# Patient Record
Sex: FEMALE | Race: BLACK OR AFRICAN AMERICAN | NOT HISPANIC OR LATINO | Employment: FULL TIME | URBAN - METROPOLITAN AREA
[De-identification: names, ages, dates, MRNs, and addresses within clinical notes are randomized per-mention and may not be internally consistent; named-entity substitution may affect disease eponyms.]

---

## 2023-03-21 ENCOUNTER — TELEPHONE (OUTPATIENT)
Dept: PRIMARY CARE | Facility: CLINIC | Age: 68
End: 2023-03-21

## 2023-05-01 DIAGNOSIS — I10 BENIGN ESSENTIAL HYPERTENSION: Primary | ICD-10-CM

## 2023-05-01 RX ORDER — HYDROCHLOROTHIAZIDE 25 MG/1
25 TABLET ORAL DAILY
Qty: 90 TABLET | Refills: 0 | Status: SHIPPED | OUTPATIENT
Start: 2023-05-01 | End: 2023-08-20

## 2023-05-01 RX ORDER — HYDROCHLOROTHIAZIDE 25 MG/1
1 TABLET ORAL DAILY
COMMUNITY
Start: 2020-10-15 | End: 2023-05-01 | Stop reason: SDUPTHER

## 2023-05-19 DIAGNOSIS — E11.22 CKD STAGE 3 DUE TO TYPE 2 DIABETES MELLITUS (MULTI): Primary | ICD-10-CM

## 2023-05-19 DIAGNOSIS — N18.30 CKD STAGE 3 DUE TO TYPE 2 DIABETES MELLITUS (MULTI): Primary | ICD-10-CM

## 2023-05-19 RX ORDER — DULAGLUTIDE 0.75 MG/.5ML
0.75 INJECTION, SOLUTION SUBCUTANEOUS
COMMUNITY
Start: 2021-03-09 | End: 2023-05-19 | Stop reason: SDUPTHER

## 2023-05-19 RX ORDER — DULAGLUTIDE 0.75 MG/.5ML
0.75 INJECTION, SOLUTION SUBCUTANEOUS
Qty: 6 ML | Refills: 0 | Status: SHIPPED | OUTPATIENT
Start: 2023-05-19 | End: 2023-08-07 | Stop reason: DRUGHIGH

## 2023-06-06 DIAGNOSIS — R09.81 SINUS CONGESTION: Primary | ICD-10-CM

## 2023-06-06 RX ORDER — FLUTICASONE PROPIONATE 50 MCG
SPRAY, SUSPENSION (ML) NASAL
COMMUNITY
Start: 2021-01-27 | End: 2023-06-06 | Stop reason: SDUPTHER

## 2023-06-06 RX ORDER — FLUTICASONE PROPIONATE 50 MCG
2 SPRAY, SUSPENSION (ML) NASAL
Qty: 16 G | Refills: 1 | Status: SHIPPED | OUTPATIENT
Start: 2023-06-06 | End: 2023-08-20

## 2023-08-07 ENCOUNTER — OFFICE VISIT (OUTPATIENT)
Dept: PRIMARY CARE | Facility: CLINIC | Age: 68
End: 2023-08-07
Payer: MEDICARE

## 2023-08-07 VITALS
OXYGEN SATURATION: 99 % | HEIGHT: 66 IN | WEIGHT: 235 LBS | BODY MASS INDEX: 37.77 KG/M2 | HEART RATE: 81 BPM | SYSTOLIC BLOOD PRESSURE: 120 MMHG | DIASTOLIC BLOOD PRESSURE: 82 MMHG | TEMPERATURE: 97.5 F

## 2023-08-07 DIAGNOSIS — E03.9 HYPOTHYROIDISM, UNSPECIFIED TYPE: ICD-10-CM

## 2023-08-07 DIAGNOSIS — E11.22 CKD STAGE 3 DUE TO TYPE 2 DIABETES MELLITUS (MULTI): ICD-10-CM

## 2023-08-07 DIAGNOSIS — I10 BENIGN ESSENTIAL HYPERTENSION: Primary | ICD-10-CM

## 2023-08-07 DIAGNOSIS — N18.30 CKD STAGE 3 DUE TO TYPE 2 DIABETES MELLITUS (MULTI): ICD-10-CM

## 2023-08-07 DIAGNOSIS — M50.20 CERVICAL DISC HERNIATION: ICD-10-CM

## 2023-08-07 PROCEDURE — 3074F SYST BP LT 130 MM HG: CPT | Performed by: FAMILY MEDICINE

## 2023-08-07 PROCEDURE — 3079F DIAST BP 80-89 MM HG: CPT | Performed by: FAMILY MEDICINE

## 2023-08-07 PROCEDURE — 1160F RVW MEDS BY RX/DR IN RCRD: CPT | Performed by: FAMILY MEDICINE

## 2023-08-07 PROCEDURE — 3066F NEPHROPATHY DOC TX: CPT | Performed by: FAMILY MEDICINE

## 2023-08-07 PROCEDURE — 1159F MED LIST DOCD IN RCRD: CPT | Performed by: FAMILY MEDICINE

## 2023-08-07 PROCEDURE — 1125F AMNT PAIN NOTED PAIN PRSNT: CPT | Performed by: FAMILY MEDICINE

## 2023-08-07 PROCEDURE — 1036F TOBACCO NON-USER: CPT | Performed by: FAMILY MEDICINE

## 2023-08-07 PROCEDURE — 99214 OFFICE O/P EST MOD 30 MIN: CPT | Performed by: FAMILY MEDICINE

## 2023-08-07 RX ORDER — MECLIZINE HCL 12.5 MG 12.5 MG/1
TABLET ORAL
COMMUNITY
Start: 2019-01-10 | End: 2023-11-22 | Stop reason: DRUGHIGH

## 2023-08-07 RX ORDER — ATORVASTATIN CALCIUM 40 MG/1
TABLET, FILM COATED ORAL
COMMUNITY
Start: 2013-12-16 | End: 2024-02-28 | Stop reason: SDUPTHER

## 2023-08-07 RX ORDER — HYDROXYZINE HYDROCHLORIDE 10 MG/1
1 TABLET, FILM COATED ORAL 3 TIMES DAILY PRN
COMMUNITY
Start: 2020-11-11 | End: 2024-05-19 | Stop reason: ALTCHOICE

## 2023-08-07 RX ORDER — GABAPENTIN 100 MG/1
CAPSULE ORAL NIGHTLY
COMMUNITY
Start: 2021-05-05 | End: 2024-05-19 | Stop reason: WASHOUT

## 2023-08-07 RX ORDER — LEVOTHYROXINE SODIUM 125 UG/1
TABLET ORAL
COMMUNITY
Start: 2013-12-16 | End: 2023-09-05 | Stop reason: SDUPTHER

## 2023-08-07 RX ORDER — SITAGLIPTIN 100 MG/1
TABLET, FILM COATED ORAL
COMMUNITY
Start: 2018-04-23 | End: 2023-10-24 | Stop reason: SDUPTHER

## 2023-08-07 RX ORDER — DULAGLUTIDE 1.5 MG/.5ML
1.5 INJECTION, SOLUTION SUBCUTANEOUS
Qty: 2 ML | Refills: 3 | Status: SHIPPED | OUTPATIENT
Start: 2023-08-07 | End: 2023-08-19 | Stop reason: SDUPTHER

## 2023-08-07 RX ORDER — PHENOL/SODIUM PHENOLATE
20 AEROSOL, SPRAY (ML) MUCOUS MEMBRANE DAILY
COMMUNITY
Start: 2013-12-16

## 2023-08-07 RX ORDER — AMLODIPINE BESYLATE 5 MG/1
1 TABLET ORAL DAILY
COMMUNITY
Start: 2020-10-15 | End: 2023-12-22

## 2023-08-07 RX ORDER — ASPIRIN 81 MG/1
1 TABLET ORAL DAILY
COMMUNITY
Start: 2011-08-03

## 2023-08-07 RX ORDER — METFORMIN HYDROCHLORIDE 1000 MG/1
TABLET ORAL
COMMUNITY
Start: 2013-12-16 | End: 2023-09-05 | Stop reason: SINTOL

## 2023-08-07 ASSESSMENT — PATIENT HEALTH QUESTIONNAIRE - PHQ9
SUM OF ALL RESPONSES TO PHQ9 QUESTIONS 1 AND 2: 0
1. LITTLE INTEREST OR PLEASURE IN DOING THINGS: NOT AT ALL
2. FEELING DOWN, DEPRESSED OR HOPELESS: NOT AT ALL

## 2023-08-07 ASSESSMENT — PAIN SCALES - GENERAL: PAINLEVEL: 3

## 2023-08-07 NOTE — PATIENT INSTRUCTIONS
BP seems to be controlled.  Initially high, but came down, and did not  yet take medication today.  No change in dose.    Diabetes--not tolerating metformin, so stopped 5 days ago.   Increased trulicity to 1.5 weekly.  Treatment goals:  HgbA1C less than 7.0  /80 on consistent basis, with no higher than 140/85  LDL cholesterol less than 100, and HDL cholesterol above 40.  Yearly retinal exam  Check feet periodically for sores or decreased sensation  Exercise at least 150 minutes weekly.  Work toward a goal BMI of less than 25.    Check fasting blood work when able, and will recheck A1C again in 3 months, after increase in trulicity dosing, to see if additional changes need to be made.  Fast for 12 hours prior to having blood drawn.  You should drink plenty of water, and can have black tea or black coffee, if you'd like.    For neck pain, concussion, continue with specialists

## 2023-08-07 NOTE — PROGRESS NOTES
"Subjective   Patient ID: Earline Villagran is a 67 y.o. female who presents for Medication Question and Earache.  Feb had concussion, hit in back of head with basketball.  No LOC.  Was off work until April.  Seeing Dr. Sims from , and a [facility] doctor.  MRI cervical spine showed herniated discs.    Metformin is burning stomach.  Also causes diarrhea.  Stopped.  Not checking glucose much.  148 a couple days ago, probably fasting.    Left ear is bothering  her.  Hurt at first, now feels blocked.  About 5 days ago.    Has seen ophtho.    Earache         Review of Systems   HENT:  Positive for ear pain.        Objective   /82   Pulse 81   Temp 36.4 °C (97.5 °F) (Oral)   Ht 1.676 m (5' 6\")   Wt 107 kg (235 lb)   SpO2 99%   BMI 37.93 kg/m²     Physical Exam  Vitals reviewed.   Constitutional:       Appearance: Normal appearance.   HENT:      Right Ear: Ear canal normal.      Left Ear: Ear canal normal.      Ears:      Comments: TM's dull, no erythema.  Cardiovascular:      Rate and Rhythm: Normal rate and regular rhythm.      Heart sounds: No murmur heard.  Pulmonary:      Effort: Pulmonary effort is normal.      Breath sounds: Normal breath sounds.   Musculoskeletal:      Right lower leg: No edema.      Left lower leg: No edema.   Neurological:      Mental Status: She is alert.           Assessment/Plan   Problem List Items Addressed This Visit       Benign essential hypertension - Primary    Relevant Orders    Basic Metabolic Panel    Lipid Panel    CKD stage 3 due to type 2 diabetes mellitus (CMS/[facility])    Relevant Medications    dulaglutide (Trulicity) 1.5 mg/0.5 mL pen injector injection    Other Relevant Orders    Hemoglobin A1C     Other Visit Diagnoses       Cervical disc herniation        Hypothyroidism, unspecified type        Relevant Orders    TSH with reflex to Free T4 if abnormal               "

## 2023-08-09 ENCOUNTER — LAB (OUTPATIENT)
Dept: LAB | Facility: LAB | Age: 68
End: 2023-08-09
Payer: MEDICARE

## 2023-08-09 DIAGNOSIS — E03.9 HYPOTHYROIDISM, UNSPECIFIED TYPE: ICD-10-CM

## 2023-08-09 DIAGNOSIS — I10 BENIGN ESSENTIAL HYPERTENSION: ICD-10-CM

## 2023-08-09 DIAGNOSIS — N18.30 CKD STAGE 3 DUE TO TYPE 2 DIABETES MELLITUS (MULTI): ICD-10-CM

## 2023-08-09 DIAGNOSIS — E11.22 CKD STAGE 3 DUE TO TYPE 2 DIABETES MELLITUS (MULTI): ICD-10-CM

## 2023-08-09 LAB
ANION GAP IN SER/PLAS: 13 MMOL/L (ref 10–20)
CALCIUM (MG/DL) IN SER/PLAS: 9.4 MG/DL (ref 8.6–10.6)
CARBON DIOXIDE, TOTAL (MMOL/L) IN SER/PLAS: 29 MMOL/L (ref 21–32)
CHLORIDE (MMOL/L) IN SER/PLAS: 104 MMOL/L (ref 98–107)
CHOLESTEROL (MG/DL) IN SER/PLAS: 276 MG/DL (ref 0–199)
CHOLESTEROL IN HDL (MG/DL) IN SER/PLAS: 48.5 MG/DL
CHOLESTEROL/HDL RATIO: 5.7
CREATININE (MG/DL) IN SER/PLAS: 1.32 MG/DL (ref 0.5–1.05)
ESTIMATED AVERAGE GLUCOSE FOR HBA1C: 186 MG/DL
GFR FEMALE: 44 ML/MIN/1.73M2
GLUCOSE (MG/DL) IN SER/PLAS: 148 MG/DL (ref 74–99)
HEMOGLOBIN A1C/HEMOGLOBIN TOTAL IN BLOOD: 8.1 %
LDL: 198 MG/DL (ref 0–99)
POTASSIUM (MMOL/L) IN SER/PLAS: 4.7 MMOL/L (ref 3.5–5.3)
SODIUM (MMOL/L) IN SER/PLAS: 141 MMOL/L (ref 136–145)
THYROTROPIN (MIU/L) IN SER/PLAS BY DETECTION LIMIT <= 0.05 MIU/L: 35.33 MIU/L (ref 0.44–3.98)
THYROXINE (T4) FREE (NG/DL) IN SER/PLAS: 0.8 NG/DL (ref 0.78–1.48)
TRIGLYCERIDE (MG/DL) IN SER/PLAS: 148 MG/DL (ref 0–149)
UREA NITROGEN (MG/DL) IN SER/PLAS: 15 MG/DL (ref 6–23)
VLDL: 30 MG/DL (ref 0–40)

## 2023-08-09 PROCEDURE — 84439 ASSAY OF FREE THYROXINE: CPT

## 2023-08-09 PROCEDURE — 84443 ASSAY THYROID STIM HORMONE: CPT

## 2023-08-09 PROCEDURE — 83036 HEMOGLOBIN GLYCOSYLATED A1C: CPT

## 2023-08-09 PROCEDURE — 80061 LIPID PANEL: CPT

## 2023-08-09 PROCEDURE — 80048 BASIC METABOLIC PNL TOTAL CA: CPT

## 2023-08-09 PROCEDURE — 36415 COLL VENOUS BLD VENIPUNCTURE: CPT

## 2023-08-18 ENCOUNTER — TELEPHONE (OUTPATIENT)
Dept: PRIMARY CARE | Facility: CLINIC | Age: 68
End: 2023-08-18
Payer: MEDICARE

## 2023-08-18 DIAGNOSIS — E11.22 CKD STAGE 3 DUE TO TYPE 2 DIABETES MELLITUS (MULTI): ICD-10-CM

## 2023-08-18 DIAGNOSIS — N18.30 CKD STAGE 3 DUE TO TYPE 2 DIABETES MELLITUS (MULTI): ICD-10-CM

## 2023-08-19 DIAGNOSIS — I10 BENIGN ESSENTIAL HYPERTENSION: ICD-10-CM

## 2023-08-19 DIAGNOSIS — R09.81 SINUS CONGESTION: ICD-10-CM

## 2023-08-19 RX ORDER — DULAGLUTIDE 1.5 MG/.5ML
1.5 INJECTION, SOLUTION SUBCUTANEOUS
Qty: 12 EACH | Refills: 1 | Status: SHIPPED | OUTPATIENT
Start: 2023-08-19 | End: 2024-01-07 | Stop reason: DRUGHIGH

## 2023-08-20 RX ORDER — FLUTICASONE PROPIONATE 50 MCG
2 SPRAY, SUSPENSION (ML) NASAL DAILY
Qty: 32 G | Refills: 1 | Status: SHIPPED | OUTPATIENT
Start: 2023-08-20 | End: 2024-01-28

## 2023-08-20 RX ORDER — HYDROCHLOROTHIAZIDE 25 MG/1
25 TABLET ORAL DAILY
Qty: 90 TABLET | Refills: 1 | Status: SHIPPED | OUTPATIENT
Start: 2023-08-20 | End: 2023-12-29 | Stop reason: SDUPTHER

## 2023-09-05 ENCOUNTER — OFFICE VISIT (OUTPATIENT)
Dept: PRIMARY CARE | Facility: CLINIC | Age: 68
End: 2023-09-05
Payer: MEDICARE

## 2023-09-05 VITALS
SYSTOLIC BLOOD PRESSURE: 122 MMHG | WEIGHT: 236 LBS | HEART RATE: 106 BPM | DIASTOLIC BLOOD PRESSURE: 82 MMHG | OXYGEN SATURATION: 96 % | HEIGHT: 66 IN | TEMPERATURE: 97.3 F | BODY MASS INDEX: 37.93 KG/M2

## 2023-09-05 DIAGNOSIS — E03.9 HYPOTHYROIDISM, UNSPECIFIED TYPE: ICD-10-CM

## 2023-09-05 DIAGNOSIS — N18.30 CKD STAGE 3 DUE TO TYPE 2 DIABETES MELLITUS (MULTI): Primary | ICD-10-CM

## 2023-09-05 DIAGNOSIS — E11.22 CKD STAGE 3 DUE TO TYPE 2 DIABETES MELLITUS (MULTI): Primary | ICD-10-CM

## 2023-09-05 PROCEDURE — 3079F DIAST BP 80-89 MM HG: CPT | Performed by: FAMILY MEDICINE

## 2023-09-05 PROCEDURE — 3066F NEPHROPATHY DOC TX: CPT | Performed by: FAMILY MEDICINE

## 2023-09-05 PROCEDURE — 99213 OFFICE O/P EST LOW 20 MIN: CPT | Performed by: FAMILY MEDICINE

## 2023-09-05 PROCEDURE — 1126F AMNT PAIN NOTED NONE PRSNT: CPT | Performed by: FAMILY MEDICINE

## 2023-09-05 PROCEDURE — 1160F RVW MEDS BY RX/DR IN RCRD: CPT | Performed by: FAMILY MEDICINE

## 2023-09-05 PROCEDURE — 3074F SYST BP LT 130 MM HG: CPT | Performed by: FAMILY MEDICINE

## 2023-09-05 PROCEDURE — 3052F HG A1C>EQUAL 8.0%<EQUAL 9.0%: CPT | Performed by: FAMILY MEDICINE

## 2023-09-05 PROCEDURE — 1036F TOBACCO NON-USER: CPT | Performed by: FAMILY MEDICINE

## 2023-09-05 PROCEDURE — 1159F MED LIST DOCD IN RCRD: CPT | Performed by: FAMILY MEDICINE

## 2023-09-05 RX ORDER — GLIPIZIDE 10 MG/1
10 TABLET ORAL
Qty: 180 TABLET | Refills: 1 | Status: SHIPPED | OUTPATIENT
Start: 2023-09-05 | End: 2023-09-05 | Stop reason: SDUPTHER

## 2023-09-05 RX ORDER — LEVOTHYROXINE SODIUM 137 UG/1
137 TABLET ORAL
Qty: 90 TABLET | Refills: 0 | Status: SHIPPED | OUTPATIENT
Start: 2023-09-05 | End: 2023-11-12

## 2023-09-05 RX ORDER — GLIPIZIDE 10 MG/1
10 TABLET ORAL
Qty: 180 TABLET | Refills: 1 | Status: SHIPPED | OUTPATIENT
Start: 2023-09-05 | End: 2023-11-18 | Stop reason: WASHOUT

## 2023-09-05 ASSESSMENT — PAIN SCALES - GENERAL: PAINLEVEL: 0-NO PAIN

## 2023-09-05 NOTE — PROGRESS NOTES
"Subjective   Patient ID: Earline Villagran is a 67 y.o. female who presents for Leg Cramps (\"Charley Horses \" both legs , muscles jumping ) and Rash (Skin changing arms ).  Patient had episode of muscle spasms right hand and both legs.  Was pretty intense.  Happened 3 days ago, thinks it was related to eating watermelon (although has eaten it plenty of times without a problem)  Took 4 gabapentin, and eventually subsided. Hasn't come back.  Glucose running over 240.  Trulicity higher dose now, but not taking metformin, since caused stomach burning.  Also off of glipizide, since thought that was what had been causing the abdominal pain.    Needs refill of levothyroxine.  Currently on 125 mcg, but needed to be increased due to High TSH.    Rash        Review of Systems   Skin:  Positive for rash.       Objective   /82 (BP Location: Right arm, Patient Position: Sitting, BP Cuff Size: Large adult)   Pulse 106   Temp 36.3 °C (97.3 °F) (Temporal)   Ht 1.676 m (5' 6\")   Wt 107 kg (236 lb)   SpO2 96%   BMI 38.09 kg/m²     Physical Exam  Vitals reviewed.   Constitutional:       Appearance: Normal appearance.   Cardiovascular:      Rate and Rhythm: Normal rate and regular rhythm.   Pulmonary:      Effort: Pulmonary effort is normal.      Breath sounds: Normal breath sounds.   Neurological:      Mental Status: She is alert.           Assessment/Plan   Problem List Items Addressed This Visit       CKD stage 3 due to type 2 diabetes mellitus (CMS/HCC) - Primary    Relevant Medications    glipiZIDE (Glucotrol) 10 mg tablet     Other Visit Diagnoses       Hypothyroidism, unspecified type        Relevant Medications    levothyroxine (Synthroid, Levoxyl) 137 mcg tablet               "

## 2023-09-06 NOTE — PATIENT INSTRUCTIONS
Diabetes with A1C 8.1, and glucose running high at home.  Continue the increased dose of Trulicity of 1.5 weekly.  Remain off of metformin.  Restart glipizide 10 mg once a day, but increase to twice a day, if glucose is still high.  Follow up 3 months.                                       Treatment goals include:  HgbA1C less than 7.0  /80 on consistent basis, with no higher than 140/85  LDL cholesterol less than 100, and HDL cholesterol above 40.  Yearly retinal exam  Check feet periodically for sores or decreased sensation  Exercise at least 150 minutes weekly.  Work toward a goal BMI of less than 25.    For thyroid, increase the levothyroxine to 137 mcg daily.  Recheck level  2-3 months.

## 2023-10-24 DIAGNOSIS — N18.30 CKD STAGE 3 DUE TO TYPE 2 DIABETES MELLITUS (MULTI): Primary | ICD-10-CM

## 2023-10-24 DIAGNOSIS — E11.22 CKD STAGE 3 DUE TO TYPE 2 DIABETES MELLITUS (MULTI): Primary | ICD-10-CM

## 2023-10-26 ENCOUNTER — OFFICE VISIT (OUTPATIENT)
Dept: PRIMARY CARE | Facility: CLINIC | Age: 68
End: 2023-10-26
Payer: MEDICARE

## 2023-10-26 ENCOUNTER — ANCILLARY PROCEDURE (OUTPATIENT)
Dept: RADIOLOGY | Facility: CLINIC | Age: 68
End: 2023-10-26
Payer: MEDICARE

## 2023-10-26 VITALS
WEIGHT: 229 LBS | BODY MASS INDEX: 36.8 KG/M2 | SYSTOLIC BLOOD PRESSURE: 112 MMHG | TEMPERATURE: 97.5 F | HEIGHT: 66 IN | OXYGEN SATURATION: 97 % | DIASTOLIC BLOOD PRESSURE: 74 MMHG | HEART RATE: 104 BPM

## 2023-10-26 DIAGNOSIS — K59.00 CONSTIPATION, UNSPECIFIED CONSTIPATION TYPE: ICD-10-CM

## 2023-10-26 DIAGNOSIS — K59.00 CONSTIPATION, UNSPECIFIED CONSTIPATION TYPE: Primary | ICD-10-CM

## 2023-10-26 PROCEDURE — 1160F RVW MEDS BY RX/DR IN RCRD: CPT | Performed by: FAMILY MEDICINE

## 2023-10-26 PROCEDURE — 74018 RADEX ABDOMEN 1 VIEW: CPT | Performed by: RADIOLOGY

## 2023-10-26 PROCEDURE — 3074F SYST BP LT 130 MM HG: CPT | Performed by: FAMILY MEDICINE

## 2023-10-26 PROCEDURE — 3078F DIAST BP <80 MM HG: CPT | Performed by: FAMILY MEDICINE

## 2023-10-26 PROCEDURE — 1126F AMNT PAIN NOTED NONE PRSNT: CPT | Performed by: FAMILY MEDICINE

## 2023-10-26 PROCEDURE — 1036F TOBACCO NON-USER: CPT | Performed by: FAMILY MEDICINE

## 2023-10-26 PROCEDURE — 74018 RADEX ABDOMEN 1 VIEW: CPT | Mod: FY

## 2023-10-26 PROCEDURE — 1159F MED LIST DOCD IN RCRD: CPT | Performed by: FAMILY MEDICINE

## 2023-10-26 PROCEDURE — 3052F HG A1C>EQUAL 8.0%<EQUAL 9.0%: CPT | Performed by: FAMILY MEDICINE

## 2023-10-26 PROCEDURE — 3066F NEPHROPATHY DOC TX: CPT | Performed by: FAMILY MEDICINE

## 2023-10-26 PROCEDURE — 99214 OFFICE O/P EST MOD 30 MIN: CPT | Performed by: FAMILY MEDICINE

## 2023-10-26 ASSESSMENT — PAIN SCALES - GENERAL: PAINLEVEL: 0-NO PAIN

## 2023-10-26 ASSESSMENT — PATIENT HEALTH QUESTIONNAIRE - PHQ9
2. FEELING DOWN, DEPRESSED OR HOPELESS: NOT AT ALL
1. LITTLE INTEREST OR PLEASURE IN DOING THINGS: NOT AT ALL
SUM OF ALL RESPONSES TO PHQ9 QUESTIONS 1 AND 2: 0

## 2023-10-26 NOTE — ASSESSMENT & PLAN NOTE
Review of history reveals long standing issue  Will obtain abdomen KUB to rule out any blockage or immediate changes  Recommend increasing fiber, take Miralax daily with increased fluids and increased fiber in diet, can titrate down if bowels return to regular  If persistent symptoms, suspect this may be related to delayed gastric emptying, changes in motility related to GLP-1, may need to reconsider diabetic regimen

## 2023-10-26 NOTE — PROGRESS NOTES
"Subjective   Patient ID: Earline Villagran is a 68 y.o. female who presents for Abdominal Pain and Constipation.    HPI   Having a hard time having a bowel movement.  Thought it was medication related therefore has stopped taking Metformin or Glipizide but has not had much improvement.  Continues to have intermittent episodes of burning sensation in the abdomen.    Started about 6 months ago and has been going on and off since that time.     Has been on Trulicity for 2 years, has been increasing the dose with that medication.     Has a hard time going and when she goes it is pebble like symptoms.  Longest without BM has been 2 days but generally has BM daily.    No blood in the stool, no mucus    Review of Systems  Negative unless noted in HPI    Objective   /74 (BP Location: Left arm, Patient Position: Sitting, BP Cuff Size: Large adult)   Pulse 104   Temp 36.4 °C (97.5 °F) (Oral)   Ht 1.676 m (5' 6\")   Wt 104 kg (229 lb)   SpO2 97%   BMI 36.96 kg/m²     Physical Exam  Constitutional:       Appearance: Normal appearance.   Cardiovascular:      Rate and Rhythm: Normal rate and regular rhythm.   Pulmonary:      Effort: Pulmonary effort is normal.   Abdominal:      General: Bowel sounds are normal. There is no distension.      Palpations: Abdomen is soft.      Tenderness: There is no abdominal tenderness. There is no guarding or rebound.   Neurological:      Mental Status: She is alert.         Assessment/Plan   Problem List Items Addressed This Visit             ICD-10-CM    Constipation - Primary K59.00     Review of history reveals long standing issue  Will obtain abdomen KUB to rule out any blockage or immediate changes  Recommend increasing fiber, take Miralax daily with increased fluids and increased fiber in diet, can titrate down if bowels return to regular  If persistent symptoms, suspect this may be related to delayed gastric emptying, changes in motility related to GLP-1, may need to reconsider " diabetic regimen         Relevant Orders    XR abdomen 1 view

## 2023-10-26 NOTE — LETTER
October 26, 2023     Patient: Earline Villagran   YOB: 1955   Date of Visit: 10/26/2023       To Whom It May Concern:    Earline Villagran was seen in my clinic on 10/26/2023 at 1:30 pm. Please excuse Earline for her absence from work on this day to make the appointment.    If you have any questions or concerns, please don't hesitate to call.         Sincerely,         Jyoti Osorio MD        CC: No Recipients

## 2023-11-07 ENCOUNTER — TELEPHONE (OUTPATIENT)
Dept: PRIMARY CARE | Facility: CLINIC | Age: 68
End: 2023-11-07
Payer: MEDICARE

## 2023-11-07 DIAGNOSIS — J32.9 SINUSITIS, UNSPECIFIED CHRONICITY, UNSPECIFIED LOCATION: Primary | ICD-10-CM

## 2023-11-07 RX ORDER — DOXYCYCLINE 100 MG/1
100 CAPSULE ORAL 2 TIMES DAILY
Qty: 20 CAPSULE | Refills: 0 | Status: SHIPPED | OUTPATIENT
Start: 2023-11-07 | End: 2023-11-08 | Stop reason: SDUPTHER

## 2023-11-08 DIAGNOSIS — J32.9 SINUSITIS, UNSPECIFIED CHRONICITY, UNSPECIFIED LOCATION: ICD-10-CM

## 2023-11-08 RX ORDER — DOXYCYCLINE 100 MG/1
100 CAPSULE ORAL 2 TIMES DAILY
Qty: 20 CAPSULE | Refills: 0 | Status: SHIPPED | OUTPATIENT
Start: 2023-11-08 | End: 2023-11-18

## 2023-11-11 DIAGNOSIS — E03.9 HYPOTHYROIDISM, UNSPECIFIED TYPE: ICD-10-CM

## 2023-11-12 RX ORDER — LEVOTHYROXINE SODIUM 137 UG/1
137 TABLET ORAL
Qty: 90 TABLET | Refills: 0 | Status: SHIPPED | OUTPATIENT
Start: 2023-11-12 | End: 2023-12-29 | Stop reason: SDUPTHER

## 2023-11-18 DIAGNOSIS — E11.22 CKD STAGE 3 DUE TO TYPE 2 DIABETES MELLITUS (MULTI): ICD-10-CM

## 2023-11-18 DIAGNOSIS — N18.30 CKD STAGE 3 DUE TO TYPE 2 DIABETES MELLITUS (MULTI): ICD-10-CM

## 2023-11-22 ENCOUNTER — OFFICE VISIT (OUTPATIENT)
Dept: PRIMARY CARE | Facility: CLINIC | Age: 68
End: 2023-11-22
Payer: MEDICARE

## 2023-11-22 VITALS
BODY MASS INDEX: 36.8 KG/M2 | TEMPERATURE: 97.5 F | HEART RATE: 98 BPM | DIASTOLIC BLOOD PRESSURE: 88 MMHG | SYSTOLIC BLOOD PRESSURE: 128 MMHG | OXYGEN SATURATION: 98 % | WEIGHT: 229 LBS | HEIGHT: 66 IN

## 2023-11-22 DIAGNOSIS — R42 VERTIGO: Primary | ICD-10-CM

## 2023-11-22 PROCEDURE — 1125F AMNT PAIN NOTED PAIN PRSNT: CPT | Performed by: FAMILY MEDICINE

## 2023-11-22 PROCEDURE — 1160F RVW MEDS BY RX/DR IN RCRD: CPT | Performed by: FAMILY MEDICINE

## 2023-11-22 PROCEDURE — 1159F MED LIST DOCD IN RCRD: CPT | Performed by: FAMILY MEDICINE

## 2023-11-22 PROCEDURE — 1036F TOBACCO NON-USER: CPT | Performed by: FAMILY MEDICINE

## 2023-11-22 PROCEDURE — 3074F SYST BP LT 130 MM HG: CPT | Performed by: FAMILY MEDICINE

## 2023-11-22 PROCEDURE — 99213 OFFICE O/P EST LOW 20 MIN: CPT | Performed by: FAMILY MEDICINE

## 2023-11-22 PROCEDURE — 3066F NEPHROPATHY DOC TX: CPT | Performed by: FAMILY MEDICINE

## 2023-11-22 PROCEDURE — 3079F DIAST BP 80-89 MM HG: CPT | Performed by: FAMILY MEDICINE

## 2023-11-22 PROCEDURE — 3052F HG A1C>EQUAL 8.0%<EQUAL 9.0%: CPT | Performed by: FAMILY MEDICINE

## 2023-11-22 RX ORDER — MECLIZINE HYDROCHLORIDE 25 MG/1
25 TABLET ORAL 3 TIMES DAILY PRN
Qty: 30 TABLET | Refills: 1 | OUTPATIENT
Start: 2023-11-22 | End: 2024-05-20

## 2023-11-22 ASSESSMENT — ENCOUNTER SYMPTOMS
DEPRESSION: 0
LOSS OF SENSATION IN FEET: 0
OCCASIONAL FEELINGS OF UNSTEADINESS: 0

## 2023-11-22 ASSESSMENT — PATIENT HEALTH QUESTIONNAIRE - PHQ9
1. LITTLE INTEREST OR PLEASURE IN DOING THINGS: NOT AT ALL
SUM OF ALL RESPONSES TO PHQ9 QUESTIONS 1 AND 2: 0
2. FEELING DOWN, DEPRESSED OR HOPELESS: NOT AT ALL

## 2023-11-22 ASSESSMENT — PAIN SCALES - GENERAL: PAINLEVEL: 4

## 2023-11-22 NOTE — PROGRESS NOTES
"Subjective   Patient ID: Earline Villagran is a 68 y.o. female who presents for Dizziness.  Dizziness started about 2 weeks ago.  Felt like had sinus infection, which feels  better, except  for the dizziness.  Just finishing doxycycline.  Tends to get an episode  like  this every fall, but usually resolves quicker.  Dizziness  worse with moving head, bending over.  Still  PND.  Voice is raspy.  No pain in face.  Still having frontal headache.  Ears  feel like may have water in them.  Can't  sleep on right side since it makes worse.  No vision change.  No slurry speech, or other neuro symptoms.  Doing flonase NS  Hasn't seen drainage color now.  Was yellow initially.    Right arm hurts, worse with lifting, for couple weeks.  Started after had 2 shots in that arm.  No injury    Sugars running high.        Review of Systems    Objective   /88 (BP Location: Right arm, Patient Position: Sitting, BP Cuff Size: Large adult)   Pulse 98   Temp 36.4 °C (97.5 °F) (Oral)   Ht 1.676 m (5' 6\")   Wt 104 kg (229 lb)   SpO2 98%   BMI 36.96 kg/m²     Physical Exam  Vitals reviewed.   Constitutional:       Appearance: Normal appearance.   HENT:      Head: Normocephalic and atraumatic.      Right Ear: Tympanic membrane and ear canal normal. Tympanic membrane is not injected.      Left Ear: Tympanic membrane and ear canal normal. Tympanic membrane is not injected.      Nose: Nose normal.      Mouth/Throat:      Mouth: Mucous membranes are moist.      Pharynx: Oropharynx is clear.   Eyes:      Extraocular Movements: Extraocular movements intact.      Conjunctiva/sclera: Conjunctivae normal.      Pupils: Pupils are equal, round, and reactive to light.      Comments: No nystagmus   Neck:      Vascular: No carotid bruit.   Cardiovascular:      Rate and Rhythm: Normal rate and regular rhythm.   Pulmonary:      Effort: Pulmonary effort is normal.      Breath sounds: Normal breath sounds.   Musculoskeletal:      Cervical back: No " rigidity.      Right lower leg: No edema.      Left lower leg: No edema.   Neurological:      General: No focal deficit present.      Mental Status: She is alert and oriented to person, place, and time. Mental status is at baseline.   Psychiatric:         Mood and Affect: Mood normal.         Behavior: Behavior normal.           Assessment/Plan   Problem List Items Addressed This Visit    None  Visit Diagnoses       Vertigo    -  Primary    Relevant Medications    meclizine (Antivert) 25 mg tablet

## 2023-11-24 NOTE — PATIENT INSTRUCTIONS
Vertigo without worrisome signs, likely inner ear.  Continue Flonase nasal spray to help with congestion.  Meclizine sent to pharmacy to help with vertigo, as needed.  If not improving, or if additional symptoms, call or email.  If acute worsening, slurred speech, weakness, go to ED.    Right arm pain may be from vaccines done recently.  For now will monitor.  If ongoing problems will evaluate further.

## 2023-12-11 ENCOUNTER — OFFICE VISIT (OUTPATIENT)
Dept: PRIMARY CARE | Facility: CLINIC | Age: 68
End: 2023-12-11
Payer: MEDICARE

## 2023-12-11 VITALS
OXYGEN SATURATION: 96 % | SYSTOLIC BLOOD PRESSURE: 126 MMHG | TEMPERATURE: 98.2 F | HEIGHT: 66 IN | BODY MASS INDEX: 36.8 KG/M2 | DIASTOLIC BLOOD PRESSURE: 76 MMHG | WEIGHT: 229 LBS | HEART RATE: 106 BPM

## 2023-12-11 DIAGNOSIS — N18.30 CKD STAGE 3 DUE TO TYPE 2 DIABETES MELLITUS (MULTI): ICD-10-CM

## 2023-12-11 DIAGNOSIS — E03.9 HYPOTHYROIDISM, UNSPECIFIED TYPE: ICD-10-CM

## 2023-12-11 DIAGNOSIS — I70.203 ATHEROSCLEROSIS OF NATIVE ARTERY OF BOTH LOWER EXTREMITIES, WITH UNSPECIFIED PRESENCE OF CLINICAL MANIFESTATION (CMS-HCC): ICD-10-CM

## 2023-12-11 DIAGNOSIS — E66.01 MORBID OBESITY (MULTI): ICD-10-CM

## 2023-12-11 DIAGNOSIS — I10 BENIGN ESSENTIAL HYPERTENSION: Primary | ICD-10-CM

## 2023-12-11 DIAGNOSIS — E11.22 CKD STAGE 3 DUE TO TYPE 2 DIABETES MELLITUS (MULTI): ICD-10-CM

## 2023-12-11 PROCEDURE — 1125F AMNT PAIN NOTED PAIN PRSNT: CPT | Performed by: FAMILY MEDICINE

## 2023-12-11 PROCEDURE — 99213 OFFICE O/P EST LOW 20 MIN: CPT | Performed by: FAMILY MEDICINE

## 2023-12-11 PROCEDURE — 1036F TOBACCO NON-USER: CPT | Performed by: FAMILY MEDICINE

## 2023-12-11 PROCEDURE — 3052F HG A1C>EQUAL 8.0%<EQUAL 9.0%: CPT | Performed by: FAMILY MEDICINE

## 2023-12-11 PROCEDURE — 1160F RVW MEDS BY RX/DR IN RCRD: CPT | Performed by: FAMILY MEDICINE

## 2023-12-11 PROCEDURE — 1159F MED LIST DOCD IN RCRD: CPT | Performed by: FAMILY MEDICINE

## 2023-12-11 PROCEDURE — 3078F DIAST BP <80 MM HG: CPT | Performed by: FAMILY MEDICINE

## 2023-12-11 PROCEDURE — 3066F NEPHROPATHY DOC TX: CPT | Performed by: FAMILY MEDICINE

## 2023-12-11 PROCEDURE — 3074F SYST BP LT 130 MM HG: CPT | Performed by: FAMILY MEDICINE

## 2023-12-11 ASSESSMENT — PAIN SCALES - GENERAL: PAINLEVEL: 5

## 2023-12-11 ASSESSMENT — ENCOUNTER SYMPTOMS
LOSS OF SENSATION IN FEET: 0
DEPRESSION: 0
OCCASIONAL FEELINGS OF UNSTEADINESS: 0

## 2023-12-11 ASSESSMENT — PATIENT HEALTH QUESTIONNAIRE - PHQ9
2. FEELING DOWN, DEPRESSED OR HOPELESS: NOT AT ALL
SUM OF ALL RESPONSES TO PHQ9 QUESTIONS 1 AND 2: 0
1. LITTLE INTEREST OR PLEASURE IN DOING THINGS: NOT AT ALL

## 2023-12-11 NOTE — PROGRESS NOTES
"Subjective   Patient ID: Earline Villagran is a 68 y.o. female who presents for Hypertension, Diabetes Mellitus, and Arm Pain.  Sinus getting  better since on doxycycline.  Still gets vertigo if lays on right side.  Otherwise doing better with bending over  and putting head back.  Previously wasn't  able to do that.    Hasn't checked glucose for about a week.  Was doing better when did check.    Right shoulder pain since had 2 injections in it   Peppermint oil rub is helping.    Energy is OK.  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        Review of Systems    Objective   /76 (BP Location: Right arm, Patient Position: Sitting, BP Cuff Size: Large adult)   Pulse 106   Temp 36.8 °C (98.2 °F) (Oral)   Ht 1.676 m (5' 6\")   Wt 104 kg (229 lb)   SpO2 96%   BMI 36.96 kg/m²     Physical Exam  Vitals reviewed.   Constitutional:       Appearance: Normal appearance.   Cardiovascular:      Rate and Rhythm: Normal rate and regular rhythm.      Heart sounds: No murmur heard.  Pulmonary:      Effort: Pulmonary effort is normal.      Breath sounds: Normal breath sounds.   Musculoskeletal:      Right lower leg: No edema.      Left lower leg: No edema.   Neurological:      Mental Status: She is alert.           Assessment/Plan   Problem List Items Addressed This Visit       Benign essential hypertension - Primary    CKD stage 3 due to type 2 diabetes mellitus (CMS/HCC)    Relevant Orders    Lipid Panel    Hemoglobin A1C    Basic Metabolic Panel    Atherosclerosis of native artery of both lower extremities, with unspecified presence of clinical manifestation (CMS/HCC)    Morbid obesity (CMS/HCC)     Other Visit Diagnoses       Hypothyroidism, unspecified type        Relevant Orders    TSH with reflex to Free T4 if abnormal               "

## 2023-12-11 NOTE — LETTER
December 11, 2023     Patient: Earline Villagran   YOB: 1955   Date of Visit: 12/11/2023       To Whom It May Concern:    Earline Villagran was seen in my clinic on 12/11/2023 at 5:00 pm. Please excuse Earline for her absence from work on this day to make the appointment.    If you have any questions or concerns, please don't hesitate to call.         Sincerely,         Patricia Calderon MD

## 2023-12-12 PROBLEM — E66.01 MORBID OBESITY (MULTI): Status: ACTIVE | Noted: 2023-12-12

## 2023-12-12 PROBLEM — I70.203 ATHEROSCLEROSIS OF NATIVE ARTERY OF BOTH LOWER EXTREMITIES, WITH UNSPECIFIED PRESENCE OF CLINICAL MANIFESTATION (CMS-HCC): Status: ACTIVE | Noted: 2023-12-12

## 2023-12-12 PROBLEM — C73 CANCER OF THYROID (MULTI): Status: RESOLVED | Noted: 2023-12-12 | Resolved: 2023-12-12

## 2023-12-12 PROBLEM — C73 CANCER OF THYROID (MULTI): Status: ACTIVE | Noted: 2023-12-12

## 2023-12-12 NOTE — PATIENT INSTRUCTIONS
Diabetes, currently taking  Trulicity of 1.5 weekly and glipizide 10  mg.  Check A1C, with other blood work.  Follow up 3 months.                                       Treatment goals include:  HgbA1C less than 7.0  /80 on consistent basis, with no higher than 140/85  LDL cholesterol less than 100, and HDL cholesterol above 40.  Yearly retinal exam  Check feet periodically for sores or decreased sensation  Exercise at least 150 minutes weekly.  Work toward a goal BMI of less than 25.     For thyroid, increase the levothyroxine to 137 mcg daily, with 2 on Sunday.  Check levels.    For cholesterol, taking atorvastatin.  Check  fasting lipids.    BP is controlled.  Taking  amlodipine,  hydrochlorothiazide.    Fast for 12 hours prior to having blood drawn.  You should drink plenty of water, and can have black tea or black coffee, if you'd like.    Sinusitis symptoms resolved, vertigo nearly gone (except when laying on right side)

## 2023-12-22 DIAGNOSIS — I10 BENIGN ESSENTIAL HYPERTENSION: Primary | ICD-10-CM

## 2023-12-22 RX ORDER — AMLODIPINE BESYLATE 5 MG/1
5 TABLET ORAL DAILY
Qty: 90 TABLET | Refills: 1 | Status: SHIPPED | OUTPATIENT
Start: 2023-12-22

## 2023-12-29 ENCOUNTER — LAB (OUTPATIENT)
Dept: LAB | Facility: LAB | Age: 68
End: 2023-12-29
Payer: MEDICARE

## 2023-12-29 DIAGNOSIS — E11.22 CKD STAGE 3 DUE TO TYPE 2 DIABETES MELLITUS (MULTI): ICD-10-CM

## 2023-12-29 DIAGNOSIS — E03.9 HYPOTHYROIDISM, UNSPECIFIED TYPE: ICD-10-CM

## 2023-12-29 DIAGNOSIS — I10 BENIGN ESSENTIAL HYPERTENSION: ICD-10-CM

## 2023-12-29 DIAGNOSIS — N18.30 CKD STAGE 3 DUE TO TYPE 2 DIABETES MELLITUS (MULTI): ICD-10-CM

## 2023-12-29 LAB
ANION GAP SERPL CALC-SCNC: 12 MMOL/L (ref 10–20)
BUN SERPL-MCNC: 23 MG/DL (ref 6–23)
CALCIUM SERPL-MCNC: 9.8 MG/DL (ref 8.6–10.6)
CHLORIDE SERPL-SCNC: 99 MMOL/L (ref 98–107)
CHOLEST SERPL-MCNC: 222 MG/DL (ref 0–199)
CHOLESTEROL/HDL RATIO: 4.4
CO2 SERPL-SCNC: 31 MMOL/L (ref 21–32)
CREAT SERPL-MCNC: 1.15 MG/DL (ref 0.5–1.05)
EST. AVERAGE GLUCOSE BLD GHB EST-MCNC: 206 MG/DL
GFR SERPL CREATININE-BSD FRML MDRD: 52 ML/MIN/1.73M*2
GLUCOSE SERPL-MCNC: 195 MG/DL (ref 74–99)
HBA1C MFR BLD: 8.8 %
HDLC SERPL-MCNC: 50.3 MG/DL
LDLC SERPL CALC-MCNC: 140 MG/DL
NON HDL CHOLESTEROL: 172 MG/DL (ref 0–149)
POTASSIUM SERPL-SCNC: 4.4 MMOL/L (ref 3.5–5.3)
SODIUM SERPL-SCNC: 138 MMOL/L (ref 136–145)
T4 FREE SERPL-MCNC: 1.53 NG/DL (ref 0.78–1.48)
TRIGL SERPL-MCNC: 161 MG/DL (ref 0–149)
TSH SERPL-ACNC: 0.14 MIU/L (ref 0.44–3.98)
VLDL: 32 MG/DL (ref 0–40)

## 2023-12-29 PROCEDURE — 83036 HEMOGLOBIN GLYCOSYLATED A1C: CPT

## 2023-12-29 PROCEDURE — 36415 COLL VENOUS BLD VENIPUNCTURE: CPT

## 2023-12-29 PROCEDURE — 84443 ASSAY THYROID STIM HORMONE: CPT

## 2023-12-29 PROCEDURE — 80048 BASIC METABOLIC PNL TOTAL CA: CPT

## 2023-12-29 PROCEDURE — 84439 ASSAY OF FREE THYROXINE: CPT

## 2023-12-29 PROCEDURE — 80061 LIPID PANEL: CPT

## 2023-12-29 RX ORDER — LEVOTHYROXINE SODIUM 137 UG/1
TABLET ORAL
Qty: 90 TABLET | Refills: 0 | Status: SHIPPED | OUTPATIENT
Start: 2023-12-29 | End: 2024-02-28 | Stop reason: SDUPTHER

## 2023-12-29 RX ORDER — HYDROCHLOROTHIAZIDE 25 MG/1
25 TABLET ORAL DAILY
Qty: 90 TABLET | Refills: 1 | Status: SHIPPED | OUTPATIENT
Start: 2023-12-29

## 2024-01-07 DIAGNOSIS — N18.30 CKD STAGE 3 DUE TO TYPE 2 DIABETES MELLITUS (MULTI): ICD-10-CM

## 2024-01-07 DIAGNOSIS — E11.22 CKD STAGE 3 DUE TO TYPE 2 DIABETES MELLITUS (MULTI): ICD-10-CM

## 2024-01-27 DIAGNOSIS — R09.81 SINUS CONGESTION: ICD-10-CM

## 2024-01-28 RX ORDER — FLUTICASONE PROPIONATE 50 MCG
2 SPRAY, SUSPENSION (ML) NASAL DAILY
Qty: 16 G | Refills: 1 | Status: SHIPPED | OUTPATIENT
Start: 2024-01-28 | End: 2024-04-15 | Stop reason: SDUPTHER

## 2024-02-04 ENCOUNTER — APPOINTMENT (OUTPATIENT)
Dept: RADIOLOGY | Facility: HOSPITAL | Age: 69
End: 2024-02-04
Payer: MEDICARE

## 2024-02-04 ENCOUNTER — HOSPITAL ENCOUNTER (EMERGENCY)
Facility: HOSPITAL | Age: 69
Discharge: HOME | End: 2024-02-04
Attending: EMERGENCY MEDICINE
Payer: MEDICARE

## 2024-02-04 VITALS
DIASTOLIC BLOOD PRESSURE: 80 MMHG | HEART RATE: 84 BPM | WEIGHT: 230 LBS | RESPIRATION RATE: 16 BRPM | SYSTOLIC BLOOD PRESSURE: 147 MMHG | OXYGEN SATURATION: 100 % | HEIGHT: 66 IN | BODY MASS INDEX: 36.96 KG/M2

## 2024-02-04 DIAGNOSIS — N17.9 ACUTE KIDNEY INJURY (CMS-HCC): Primary | ICD-10-CM

## 2024-02-04 DIAGNOSIS — M25.511 ACUTE PAIN OF RIGHT SHOULDER: ICD-10-CM

## 2024-02-04 LAB
ALBUMIN SERPL BCP-MCNC: 4 G/DL (ref 3.4–5)
ALP SERPL-CCNC: 62 U/L (ref 33–136)
ALT SERPL W P-5'-P-CCNC: 14 U/L (ref 7–45)
ANION GAP SERPL CALC-SCNC: 12 MMOL/L (ref 10–20)
ANION GAP SERPL CALC-SCNC: 20 MMOL/L (ref 10–20)
AST SERPL W P-5'-P-CCNC: 30 U/L (ref 9–39)
BILIRUB SERPL-MCNC: 0.5 MG/DL (ref 0–1.2)
BNP SERPL-MCNC: 12 PG/ML (ref 0–99)
BUN SERPL-MCNC: 23 MG/DL (ref 6–23)
BUN SERPL-MCNC: 23 MG/DL (ref 6–23)
CALCIUM SERPL-MCNC: 8.9 MG/DL (ref 8.6–10.3)
CALCIUM SERPL-MCNC: 9.5 MG/DL (ref 8.6–10.3)
CARDIAC TROPONIN I PNL SERPL HS: 3 NG/L (ref 0–13)
CARDIAC TROPONIN I PNL SERPL HS: <3 NG/L (ref 0–13)
CHLORIDE SERPL-SCNC: 101 MMOL/L (ref 98–107)
CHLORIDE SERPL-SCNC: 98 MMOL/L (ref 98–107)
CO2 SERPL-SCNC: 20 MMOL/L (ref 21–32)
CO2 SERPL-SCNC: 27 MMOL/L (ref 21–32)
CREAT SERPL-MCNC: 1.66 MG/DL (ref 0.5–1.05)
CREAT SERPL-MCNC: 1.71 MG/DL (ref 0.5–1.05)
EGFRCR SERPLBLD CKD-EPI 2021: 32 ML/MIN/1.73M*2
EGFRCR SERPLBLD CKD-EPI 2021: 33 ML/MIN/1.73M*2
ERYTHROCYTE [DISTWIDTH] IN BLOOD BY AUTOMATED COUNT: 13 % (ref 11.5–14.5)
GLUCOSE SERPL-MCNC: 169 MG/DL (ref 74–99)
GLUCOSE SERPL-MCNC: 261 MG/DL (ref 74–99)
HCT VFR BLD AUTO: 42.6 % (ref 36–46)
HGB BLD-MCNC: 14.1 G/DL (ref 12–16)
MCH RBC QN AUTO: 29.7 PG (ref 26–34)
MCHC RBC AUTO-ENTMCNC: 33.1 G/DL (ref 32–36)
MCV RBC AUTO: 90 FL (ref 80–100)
NRBC BLD-RTO: 0 /100 WBCS (ref 0–0)
PLATELET # BLD AUTO: 281 X10*3/UL (ref 150–450)
POTASSIUM SERPL-SCNC: 3.6 MMOL/L (ref 3.5–5.3)
POTASSIUM SERPL-SCNC: 5.3 MMOL/L (ref 3.5–5.3)
PROT SERPL-MCNC: 7.9 G/DL (ref 6.4–8.2)
RBC # BLD AUTO: 4.74 X10*6/UL (ref 4–5.2)
SODIUM SERPL-SCNC: 133 MMOL/L (ref 136–145)
SODIUM SERPL-SCNC: 136 MMOL/L (ref 136–145)
WBC # BLD AUTO: 8.9 X10*3/UL (ref 4.4–11.3)

## 2024-02-04 PROCEDURE — 85027 COMPLETE CBC AUTOMATED: CPT | Performed by: EMERGENCY MEDICINE

## 2024-02-04 PROCEDURE — 73030 X-RAY EXAM OF SHOULDER: CPT | Mod: RT

## 2024-02-04 PROCEDURE — 84484 ASSAY OF TROPONIN QUANT: CPT | Performed by: EMERGENCY MEDICINE

## 2024-02-04 PROCEDURE — 36415 COLL VENOUS BLD VENIPUNCTURE: CPT | Performed by: EMERGENCY MEDICINE

## 2024-02-04 PROCEDURE — 71046 X-RAY EXAM CHEST 2 VIEWS: CPT | Performed by: STUDENT IN AN ORGANIZED HEALTH CARE EDUCATION/TRAINING PROGRAM

## 2024-02-04 PROCEDURE — 99284 EMERGENCY DEPT VISIT MOD MDM: CPT | Mod: 25 | Performed by: EMERGENCY MEDICINE

## 2024-02-04 PROCEDURE — 83880 ASSAY OF NATRIURETIC PEPTIDE: CPT | Performed by: EMERGENCY MEDICINE

## 2024-02-04 PROCEDURE — 71046 X-RAY EXAM CHEST 2 VIEWS: CPT

## 2024-02-04 PROCEDURE — 80053 COMPREHEN METABOLIC PANEL: CPT | Performed by: EMERGENCY MEDICINE

## 2024-02-04 PROCEDURE — 73030 X-RAY EXAM OF SHOULDER: CPT | Mod: RIGHT SIDE | Performed by: STUDENT IN AN ORGANIZED HEALTH CARE EDUCATION/TRAINING PROGRAM

## 2024-02-04 PROCEDURE — 80048 BASIC METABOLIC PNL TOTAL CA: CPT | Mod: CCI | Performed by: EMERGENCY MEDICINE

## 2024-02-04 PROCEDURE — 96360 HYDRATION IV INFUSION INIT: CPT | Performed by: EMERGENCY MEDICINE

## 2024-02-04 PROCEDURE — 2500000004 HC RX 250 GENERAL PHARMACY W/ HCPCS (ALT 636 FOR OP/ED): Performed by: SURGERY

## 2024-02-04 RX ADMIN — SODIUM CHLORIDE 1000 ML: 9 INJECTION, SOLUTION INTRAVENOUS at 16:05

## 2024-02-04 ASSESSMENT — COLUMBIA-SUICIDE SEVERITY RATING SCALE - C-SSRS
6. HAVE YOU EVER DONE ANYTHING, STARTED TO DO ANYTHING, OR PREPARED TO DO ANYTHING TO END YOUR LIFE?: NO
2. HAVE YOU ACTUALLY HAD ANY THOUGHTS OF KILLING YOURSELF?: NO
1. IN THE PAST MONTH, HAVE YOU WISHED YOU WERE DEAD OR WISHED YOU COULD GO TO SLEEP AND NOT WAKE UP?: NO

## 2024-02-04 ASSESSMENT — PAIN SCALES - GENERAL: PAINLEVEL_OUTOF10: 7

## 2024-02-04 ASSESSMENT — PAIN DESCRIPTION - ORIENTATION: ORIENTATION: RIGHT

## 2024-02-04 ASSESSMENT — PAIN DESCRIPTION - PAIN TYPE: TYPE: ACUTE PAIN

## 2024-02-04 ASSESSMENT — PAIN DESCRIPTION - LOCATION: LOCATION: ARM

## 2024-02-04 ASSESSMENT — PAIN DESCRIPTION - DESCRIPTORS: DESCRIPTORS: SHARP

## 2024-02-04 ASSESSMENT — PAIN - FUNCTIONAL ASSESSMENT: PAIN_FUNCTIONAL_ASSESSMENT: 0-10

## 2024-02-04 NOTE — ED PROVIDER NOTES
Chief Complaint   Patient presents with    Arm Pain       HPI:   Earline Villagran is an 68 y.o. with a history of diabetes, stage III kidney disease, hypertension presenting with right shoulder pain x 3 months.  She explains for the last 3 months she has had shoulder pain that radiates to the right elbow.  She has been using Biofreeze on the area with massage which does provide with relief.  She has been using Tylenol PM nightly for the pain.  She states she spoke with her primary care about this and was told to see an orthopedic but has not.  She denies neck pain.  Denies numbness or tingling into the fingers.  Denies chest pain or shortness of breath.  No nausea vomiting diarrhea constipation.  No dysuria urgency. No vaginal discharge burning or itching.     Medications: Amlodipine, hydrochlorothiazide, levothyroxine, Januvia, Trulicity, atorvastatin, gabapentin, ASA 81 mg  Soc HX:  Allergies   Allergen Reactions    Shrimp Anaphylaxis    Ciprofloxacin Other     Itching, Itching    Clindamycin Other     diarrhea, diarrhea    Green Tea Swelling     Lip swells   W green tea and neto, Lip swells   W green tea and neto    Penicillins Other and Unknown     Other reaction(s): Other (See Comments)   PASSED OUT, PASSED OUT    PASSED OUT, PASSED OUT    Phenylephrine-Guaifenesin Other     Other reaction(s): Other: See Comments   Fast heart rate    Sulfamethoxazole-Trimethoprim Hives and Unknown   :  Past Medical History:   Diagnosis Date    Concussion with loss of consciousness of 30 minutes or less, subsequent encounter 11/29/2021    Concussion with loss of consciousness of 30 minutes or less, subsequent encounter    Personal history of other diseases of the circulatory system     History of hypertension    Personal history of other diseases of the female genital tract 12/21/2020    History of vaginitis    Personal history of other diseases of the respiratory system 09/22/2022    History of sinusitis    Personal  history of other endocrine, nutritional and metabolic disease     History of diabetes mellitus    Personal history of other endocrine, nutritional and metabolic disease 08/25/2021    History of morbid obesity    Personal history of other infectious and parasitic diseases 04/28/2022    History of herpes zoster    Personal history of other specified conditions     History of seizure    Personal history of other specified conditions 12/12/2020    History of vertigo    Personal history of other specified conditions 01/27/2021    History of nasal congestion     Past Surgical History:   Procedure Laterality Date    OTHER SURGICAL HISTORY  01/02/2021    Subtotal thyroidectomy     No family history on file.     Physical Exam  Vitals and nursing note reviewed.   Constitutional:       General: She is not in acute distress.     Appearance: She is well-developed.   HENT:      Head: Normocephalic and atraumatic.      Mouth/Throat:      Mouth: Mucous membranes are moist.      Pharynx: Oropharynx is clear.   Eyes:      Conjunctiva/sclera: Conjunctivae normal.   Cardiovascular:      Rate and Rhythm: Normal rate and regular rhythm.      Heart sounds: No murmur heard.  Pulmonary:      Effort: Pulmonary effort is normal. No respiratory distress.      Breath sounds: Normal breath sounds.   Abdominal:      Palpations: Abdomen is soft.      Tenderness: There is no abdominal tenderness.   Musculoskeletal:         General: No swelling.      Cervical back: Neck supple.      Comments: Exam of the right shoulder shows full range of motion although it is severely painful she is tender over the anterior shoulder and down through the biceps tendon into the elbow.  Tender on the medial and lateral epicondyles.  Positive Griffiths positive Neer.  Good strength on internal and external rotation positive empty can.    Skin:     General: Skin is warm and dry.      Capillary Refill: Capillary refill takes less than 2 seconds.   Neurological:      Mental  Status: She is alert.   Psychiatric:         Mood and Affect: Mood normal.           VS: As documented in the triage note and EMR flowsheet from this visit were reviewed.    External Records Reviewed: I reviewed recent and relevant outside records including: Labs reviewed from 1 month ago cr 1.15 and GFR was 52 --> results today creatinine 1.71 and GFR 32    EKG: Sinus tachycardia 110 bpm no bundle branch block normal axis      Medical Decision Making: This is a 68-year-old female presenting with right shoulder pain for the last 3 months.  She explains her pain starts at the shoulder and ends at the elbow.  She has been using Biofreeze and Tylenol PM which usually helps but lately has not.   Differential diagnosis includes ACS, MI, pneumothorax, cholelithiasis, arthritis, rotator cuff strain/tear, cervical spine pathology.  On exam patient's vitals were stable she has severe pain on range of motion of the right shoulder.  Positive impingement.  Probable rotator cuff tendinopathy.  Pain radiates and stops at the elbow.  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  CMP shows acute kidney injury.  1 L of IV fluids were administered.  Labs slightly improved patient states she will call her primary care doctor tomorrow for redraw.   Patient given a referral for upper extremity for her right shoulder.  Encouraged ice, Tylenol, Biofreeze for the pain.  Recommended against NSAIDs and steroids as she has stage II chronic kidney disease and poorly controlled diabetes    ED Course as of 02/04/24 1813   Sun Feb 04, 2024 1812 Discussed observation versus discharge and close outpatient follow-up.  Patient's creatinine did improve and she would prefer discharge, believe this is reasonable given she states she can follow-up with her primary care evanescently next week. [JM]      ED Course User Index  [JM] Tripp Manzano MD         Diagnoses as of 02/04/24 1813   Acute kidney injury (CMS/[facility])   Acute  pain of right shoulder       Procedures     Social Determinants of Health: None     Prescription Drug Consideration: Consider Naprosyn but patient stage III chronic kidney disease.  Considered prednisone but poorly controlled diabetes.       Discussion of Management with Other Providers:  I discussed the patient/results with: Jose Juan    Counseling: Spoke with the patient and discussed today´s findings, in addition to providing specific details for the plan of care and expected course.  Patient was given the opportunity to ask questions.    Discussed return precautions and importance of follow-up.  Advised to follow-up with Orthopedics.    Advised to return to the ED for changing or worsening symptoms, new symptoms, complaint specific precautions, and precautions listed on the discharge paperwork.  Educated on the common potential side effects of medications prescribed.    I advised the patient that the emergency evaluation and treatment provided today doesn't end their need for medical care. It is very important that they follow-up with their primary care provider or other specialist as instructed.    The plan of care was mutually agreed upon with the patient. The patient and/or family were given the opportunity to ask questions. All questions asked today in the ED were answered to the best of my ability with today's information.    I specifically advised the patient to return to the ED for changing or worsening symptoms, worrisome new symptoms, or for any complaint specific precautions listed on the discharge paperwork.    This patient was cared for in the setting of nationwide stress on resources and staffing.    This report was transcribed using voice recognition software.  Every effort was made to ensure accuracy, however, inadvertently computerized transcription errors may be present.       Tripp Weir PA-C  02/04/24 1814       Tripp Weir PA-C  02/04/24 1819

## 2024-02-08 ENCOUNTER — OFFICE VISIT (OUTPATIENT)
Dept: ORTHOPEDIC SURGERY | Facility: HOSPITAL | Age: 69
End: 2024-02-08
Payer: MEDICARE

## 2024-02-08 VITALS — WEIGHT: 230 LBS | BODY MASS INDEX: 36.96 KG/M2 | HEIGHT: 66 IN

## 2024-02-08 DIAGNOSIS — M25.811 IMPINGEMENT OF RIGHT SHOULDER: ICD-10-CM

## 2024-02-08 PROCEDURE — 1036F TOBACCO NON-USER: CPT | Performed by: STUDENT IN AN ORGANIZED HEALTH CARE EDUCATION/TRAINING PROGRAM

## 2024-02-08 PROCEDURE — 1160F RVW MEDS BY RX/DR IN RCRD: CPT | Performed by: STUDENT IN AN ORGANIZED HEALTH CARE EDUCATION/TRAINING PROGRAM

## 2024-02-08 PROCEDURE — 2500000004 HC RX 250 GENERAL PHARMACY W/ HCPCS (ALT 636 FOR OP/ED): Performed by: STUDENT IN AN ORGANIZED HEALTH CARE EDUCATION/TRAINING PROGRAM

## 2024-02-08 PROCEDURE — 99204 OFFICE O/P NEW MOD 45 MIN: CPT | Performed by: STUDENT IN AN ORGANIZED HEALTH CARE EDUCATION/TRAINING PROGRAM

## 2024-02-08 PROCEDURE — 99214 OFFICE O/P EST MOD 30 MIN: CPT | Performed by: STUDENT IN AN ORGANIZED HEALTH CARE EDUCATION/TRAINING PROGRAM

## 2024-02-08 PROCEDURE — 2500000005 HC RX 250 GENERAL PHARMACY W/O HCPCS: Performed by: STUDENT IN AN ORGANIZED HEALTH CARE EDUCATION/TRAINING PROGRAM

## 2024-02-08 PROCEDURE — 1159F MED LIST DOCD IN RCRD: CPT | Performed by: STUDENT IN AN ORGANIZED HEALTH CARE EDUCATION/TRAINING PROGRAM

## 2024-02-08 PROCEDURE — 1125F AMNT PAIN NOTED PAIN PRSNT: CPT | Performed by: STUDENT IN AN ORGANIZED HEALTH CARE EDUCATION/TRAINING PROGRAM

## 2024-02-08 RX ADMIN — LIDOCAINE HYDROCHLORIDE 2 ML: 10 INJECTION, SOLUTION INFILTRATION; PERINEURAL at 13:30

## 2024-02-08 RX ADMIN — TRIAMCINOLONE ACETONIDE 40 MG: 40 INJECTION, SUSPENSION INTRA-ARTICULAR; INTRAMUSCULAR at 13:30

## 2024-02-08 ASSESSMENT — PAIN - FUNCTIONAL ASSESSMENT: PAIN_FUNCTIONAL_ASSESSMENT: 0-10

## 2024-02-08 ASSESSMENT — PAIN DESCRIPTION - DESCRIPTORS: DESCRIPTORS: ACHING

## 2024-02-08 ASSESSMENT — PAIN SCALES - GENERAL: PAINLEVEL_OUTOF10: 7

## 2024-02-08 NOTE — PROGRESS NOTES
PRIMARY CARE PHYSICIAN: Patricia Calderon MD  REFERRING PROVIDER: No referring provider defined for this encounter.     CONSULT ORTHOPAEDIC: Shoulder Evaluation    ASSESSMENT & PLAN    Impression: 68 y.o. female with right shoulder impingement and rotator cuff tendinosis with mild degenerative changes.    Plan:   I explained to the patient the nature of their diagnosis.  I reviewed their imaging studies with them.    Based on the history, physical exam and imaging studies above, the patient's presentation is consistent with the above diagnosis.  I had a long discussion with the patient regarding their presentation and the treatment options.  We discussed initial nonoperative versus operative management options as well as potential further diagnostic imaging.  I reviewed the patient's imaging studies with her.  We discussed initial nonoperative management.  I provided her with a corticosteroid injection into the right shoulder as described below which she tolerated well.  I provided her with a referral to physical therapy to work on rotator cuff strengthening and scapular stabilization with a home exercise program and anti-inflammatory modalities needed as needed.    Follow-Up: Patient will follow-up with me as needed    At the end of the visit, all questions were answered in full. The patient is in agreement with the plan and recommendations. They will call the office with any questions/concerns.    Note dictated with [employer] software. Completed without full typed error editing and sent to avoid delay.       SUBJECTIVE  CHIEF COMPLAINT:   Chief Complaint   Patient presents with    Right Shoulder - Pain        HPI: Earline Villagran is a 68 y.o. patient. Earline Villagran complains of right shoulder pain. She has had this pain for the past 3 months, with it increasing in severity over the past week. XR done 02/04/24 at ED. Earline denies any trauma or injury to her shoulder. She is struggling to  sleep due to pain. She is unable to reach behind her back and struggles to reach to her right shoulder. Earline denies any past history of right shoulder injury.    They deny any associated neck pain.  No numbness, tingling, or paresthesias.    REVIEW OF SYSTEMS  Constitutional: See HPI for pain assessment, No significant weight loss, recent trauma  Cardiovascular: No chest pain, shortness of breath  Respiratory: No difficulty breathing, cough  Gastrointestinal: No nausea, vomiting, diarrhea, constipation  Musculoskeletal: Noted in HPI, positive for pain, restricted motion, stiffness  Integumentary: No rashes, easy bruising, redness   Neurological: no numbness or tingling in extremities, no gait disturbances   Psychiatric: No mood changes, memory changes, social issues  Heme/Lymph: no excessive swelling, easy bruising, excessive bleeding  ENT: No hearing changes  Eyes: No vision changes    Past Medical History:   Diagnosis Date    Concussion with loss of consciousness of 30 minutes or less, subsequent encounter 11/29/2021    Concussion with loss of consciousness of 30 minutes or less, subsequent encounter    Personal history of other diseases of the circulatory system     History of hypertension    Personal history of other diseases of the female genital tract 12/21/2020    History of vaginitis    Personal history of other diseases of the respiratory system 09/22/2022    History of sinusitis    Personal history of other endocrine, nutritional and metabolic disease     History of diabetes mellitus    Personal history of other endocrine, nutritional and metabolic disease 08/25/2021    History of morbid obesity    Personal history of other infectious and parasitic diseases 04/28/2022    History of herpes zoster    Personal history of other specified conditions     History of seizure    Personal history of other specified conditions 12/12/2020    History of vertigo    Personal history of other specified conditions  01/27/2021    History of nasal congestion        Allergies   Allergen Reactions    Shrimp Anaphylaxis    Ciprofloxacin Other     Itching, Itching    Clindamycin Other     diarrhea, diarrhea    Green Tea Swelling     Lip swells   W green tea and neto, Lip swells   W green tea and neto    Penicillins Other and Unknown     Other reaction(s): Other (See Comments)   PASSED OUT, PASSED OUT    PASSED OUT, PASSED OUT    Phenylephrine-Guaifenesin Other     Other reaction(s): Other: See Comments   Fast heart rate    Sulfamethoxazole-Trimethoprim Hives and Unknown        Past Surgical History:   Procedure Laterality Date    OTHER SURGICAL HISTORY  01/02/2021    Subtotal thyroidectomy        No family history on file.     Social History     Socioeconomic History    Marital status:      Spouse name: Not on file    Number of children: Not on file    Years of education: Not on file    Highest education level: Not on file   Occupational History    Not on file   Tobacco Use    Smoking status: Never     Passive exposure: Never    Smokeless tobacco: Never   Substance and Sexual Activity    Alcohol use: Yes    Drug use: Never    Sexual activity: Not on file   Other Topics Concern    Not on file   Social History Narrative    Not on file     Social Determinants of Health     Financial Resource Strain: Not on file   Food Insecurity: Not on file   Transportation Needs: Not on file   Physical Activity: Not on file   Stress: Not on file   Social Connections: Not on file   Intimate Partner Violence: Not on file   Housing Stability: Not on file        CURRENT MEDICATIONS:   Current Outpatient Medications   Medication Sig Dispense Refill    amLODIPine (Norvasc) 5 mg tablet TAKE 1 TABLET BY MOUTH DAILY 90 tablet 1    aspirin 81 mg EC tablet Take 1 tablet (81 mg) by mouth once daily.      atorvastatin (Lipitor) 40 mg tablet Take by mouth.      dulaglutide 3 mg/0.5 mL pen injector Inject 3 mg under the skin 1 (one) time per week. 6  "mL 1    fluticasone (Flonase) 50 mcg/actuation nasal spray USE 2 SPRAYS IN BOTH NOSTRILS  ONCE DAILY 16 g 1    gabapentin (Neurontin) 100 mg capsule Take by mouth once daily at bedtime.      hydroCHLOROthiazide (HYDRODiuril) 25 mg tablet Take 1 tablet (25 mg) by mouth once daily. 90 tablet 1    hydrOXYzine HCL (Atarax) 10 mg tablet Take 1 tablet (10 mg) by mouth 3 times a day as needed.      levothyroxine (Synthroid, Levoxyl) 137 mcg tablet Take 1 pill prior to breakfast  6 days a week  (skip  Sunday) 90 tablet 0    loratadine 10 mg capsule Take 10 mg by mouth.      meclizine (Antivert) 25 mg tablet Take 1 tablet (25 mg) by mouth 3 times a day as needed for dizziness. 30 tablet 1    omeprazole (PriLOSEC) 20 mg DR capsule Take by mouth.      sitaGLIPtin phosphate (Januvia) 100 mg tablet Take 1 tablet (100 mg) by mouth once daily. 90 tablet 0     No current facility-administered medications for this visit.        OBJECTIVE    PHYSICAL EXAM      8/7/2023     2:06 PM 9/5/2023     4:18 PM 10/26/2023     1:35 PM 11/22/2023    10:42 AM 12/11/2023     4:54 PM 2/4/2024     1:27 PM 2/4/2024     5:00 PM   Vitals   Systolic 120 122 112 128 126 138 147   Diastolic 82 82 74 88 76 90 80   Heart Rate  106 104 98 106 110 84   Temp  36.3 °C (97.3 °F) 36.4 °C (97.5 °F) 36.4 °C (97.5 °F) 36.8 °C (98.2 °F)     Resp      16 16   Height (in)  1.676 m (5' 6\") 1.676 m (5' 6\") 1.676 m (5' 6\") 1.676 m (5' 6\") 1.676 m (5' 6\")    Weight (lb)  236 229 229 229 230    BMI  38.09 kg/m2 36.96 kg/m2 36.96 kg/m2 36.96 kg/m2 37.12 kg/m2    BSA (m2)  2.23 m2 2.2 m2 2.2 m2 2.2 m2 2.2 m2    Visit Report Report Report Report Report Report        There is no height or weight on file to calculate BMI.    GENERAL: A/Ox3, NAD. Appears healthy, well nourished  PSYCHIATRIC: Mood stable, appropriate memory recall  EYES: EOM intact, no scleral icterus  CARDIOVASCULAR: Palpable peripheral pulses  LUNGS: Breathing non-labored on room air  SKIN: no erythema, rashes, " or ecchymosis     MUSCULOSKELETAL:  Laterality: right Shoulder Exam  - Negative Spurlings, full painless neck ROM  - Skin intact  - No erythema or warmth  - No ecchymosis or soft tissue swelling  - Shoulder ROM: Forward flexion 130, ER 35, IR upper lumbar  - Strength:      Jobes 4+/5     ER 5-/5     Belly press and bearhug 5-/5  - Palpation: Positive tenderness biceps groove and posterolateral acromion  - Griffiths and Neers positive  - Speeds and O'Briens positive    NEUROVASCULAR:  - Neurovascular Status: sensation intact to light touch distally, upper extremity motor grossly intact  - Capillary refill brisk at extremities, Bilateral peripheral pulses 2+    Imaging: Multiple views of the affected right shoulder(s) demonstrate: Minimal degenerative changes, no acute osseous abnormality, no fracture.   X-rays were personally reviewed and interpreted by me.  Radiology reports were reviewed by me as well, if readily available at the time.    L Inj/Asp on 2/8/2024 1:30 PM  Indications: pain  Details: 25 G needle, posterior approach  Medications: 40 mg triamcinolone acetonide 40 mg/mL; 2 mL lidocaine 10 mg/mL (1 %)  Outcome: tolerated well, no immediate complications  Procedure, treatment alternatives, risks and benefits explained, specific risks discussed. Consent was given by the patient. Immediately prior to procedure a time out was called to verify the correct patient, procedure, equipment, support staff and site/side marked as required. Patient was prepped and draped in the usual sterile fashion.               Willie Reyes MD  Attending Surgeon    Sports Medicine Orthopaedic Surgery  [facility] Sports Medicine [facility] [employer]

## 2024-02-10 RX ORDER — TRIAMCINOLONE ACETONIDE 40 MG/ML
40 INJECTION, SUSPENSION INTRA-ARTICULAR; INTRAMUSCULAR
Status: COMPLETED | OUTPATIENT
Start: 2024-02-08 | End: 2024-02-08

## 2024-02-10 RX ORDER — LIDOCAINE HYDROCHLORIDE 10 MG/ML
2 INJECTION INFILTRATION; PERINEURAL
Status: COMPLETED | OUTPATIENT
Start: 2024-02-08 | End: 2024-02-08

## 2024-02-15 ENCOUNTER — OFFICE VISIT (OUTPATIENT)
Dept: PRIMARY CARE | Facility: CLINIC | Age: 69
End: 2024-02-15
Payer: MEDICARE

## 2024-02-15 VITALS
HEART RATE: 105 BPM | SYSTOLIC BLOOD PRESSURE: 136 MMHG | WEIGHT: 232.4 LBS | OXYGEN SATURATION: 96 % | TEMPERATURE: 98.4 F | DIASTOLIC BLOOD PRESSURE: 80 MMHG | BODY MASS INDEX: 37.51 KG/M2

## 2024-02-15 DIAGNOSIS — K59.00 CONSTIPATION, UNSPECIFIED CONSTIPATION TYPE: Primary | ICD-10-CM

## 2024-02-15 DIAGNOSIS — Z12.31 BREAST CANCER SCREENING BY MAMMOGRAM: ICD-10-CM

## 2024-02-15 PROCEDURE — 1159F MED LIST DOCD IN RCRD: CPT | Performed by: FAMILY MEDICINE

## 2024-02-15 PROCEDURE — 99213 OFFICE O/P EST LOW 20 MIN: CPT | Performed by: FAMILY MEDICINE

## 2024-02-15 PROCEDURE — 1160F RVW MEDS BY RX/DR IN RCRD: CPT | Performed by: FAMILY MEDICINE

## 2024-02-15 PROCEDURE — 3079F DIAST BP 80-89 MM HG: CPT | Performed by: FAMILY MEDICINE

## 2024-02-15 PROCEDURE — 1125F AMNT PAIN NOTED PAIN PRSNT: CPT | Performed by: FAMILY MEDICINE

## 2024-02-15 PROCEDURE — 3075F SYST BP GE 130 - 139MM HG: CPT | Performed by: FAMILY MEDICINE

## 2024-02-15 PROCEDURE — 1036F TOBACCO NON-USER: CPT | Performed by: FAMILY MEDICINE

## 2024-02-15 ASSESSMENT — ENCOUNTER SYMPTOMS
DEPRESSION: 0
LOSS OF SENSATION IN FEET: 0
OCCASIONAL FEELINGS OF UNSTEADINESS: 0

## 2024-02-15 ASSESSMENT — PAIN SCALES - GENERAL: PAINLEVEL: 4

## 2024-02-15 NOTE — PATIENT INSTRUCTIONS
Follow up from ED visit on 2/4/24.  Diagnosed with right shoulder  bursitis and is doing much better after injection by orthopedist.    Also noted at ED was reduced kidney function, which is not new, and had improved  some when  rechecked.  Will plan  to follow up here  in  3-4 months for regular DM check, and  will recheck blood work  at that time.    To help move  bowel, and  improve gas, start  taking metamucil daily.  It may be a little worse before it gets better, as your body adjusts.  Also make sure to get about 64 ounces of water daily.    Colonoscopy is due next year  (last done in 2020, with follow up in 5 years).  Mammogram due  now.  Order put in.

## 2024-02-15 NOTE — LETTER
February 15, 2024     Patient: Earline Villagran   YOB: 1955   Date of Visit: 2/15/2024       To Whom It May Concern:    Earline Villagran was seen in my clinic on 2/15/2024 at 11:00 am. Please excuse Earline for her absence from work on this day to make the appointment.    If you have any questions or concerns, please don't hesitate to call.         Sincerely,         Patricia Calderon MD        CC: No Recipients

## 2024-02-15 NOTE — PROGRESS NOTES
Subjective   Patient ID: Earline Villagran is a 68 y.o. female who presents for Follow-up (Patient Is In For a Recent Hospital Discharge. Patient Would Like To discuss air In Intestines From a Recent Diagnosis. Patient says recent shoulder Injection did Help a lot.).  Went to ED on 2/4/24 with complaints of ongoing right arm/shoulder pain.  Had blood work, including BNP, Troponin, which were negative.  Was not having any SOB or CP.  GFR reduced, but on  recheck had improved some.  Referred to ortho for shoulder bursitis.  Injection was given and is feeling much better.  Able to sleep now, since pain isn't waking her up.  That has also improved how she is feeling.    Did have questions about the gas that was  noted on abdominal X-Ray done in the fall.  It also showed a  lot of  stool in the colon  BM's every other day.  Will get hemorrhoids if  goes too much.  Is soft when goes, doesn't strain.        Review of Systems    Objective   /80 (BP Location: Right arm, Patient Position: Sitting, BP Cuff Size: Adult)   Pulse 105   Temp 36.9 °C (98.4 °F) (Oral)   Wt 105 kg (232 lb 6.4 oz)   SpO2 96%   BMI 37.51 kg/m²     Physical Exam  Vitals reviewed.   Constitutional:       Appearance: Normal appearance.   Cardiovascular:      Rate and Rhythm: Normal rate and regular rhythm.      Heart sounds: No murmur heard.  Pulmonary:      Effort: Pulmonary effort is normal.      Breath sounds: Normal breath sounds.   Abdominal:      Palpations: Abdomen is soft.      Tenderness: There is no abdominal tenderness.   Musculoskeletal:      Right lower leg: No edema.      Left lower leg: No edema.   Neurological:      Mental Status: She is alert.           Assessment/Plan   Problem List Items Addressed This Visit       Constipation - Primary     Other Visit Diagnoses       Breast cancer screening by mammogram        Relevant Orders    BI mammo bilateral screening tomosynthesis

## 2024-02-28 DIAGNOSIS — E03.9 HYPOTHYROIDISM, UNSPECIFIED TYPE: ICD-10-CM

## 2024-02-28 DIAGNOSIS — N18.30 CKD STAGE 3 DUE TO TYPE 2 DIABETES MELLITUS (MULTI): Primary | ICD-10-CM

## 2024-02-28 DIAGNOSIS — E11.22 CKD STAGE 3 DUE TO TYPE 2 DIABETES MELLITUS (MULTI): Primary | ICD-10-CM

## 2024-02-28 RX ORDER — ATORVASTATIN CALCIUM 40 MG/1
40 TABLET, FILM COATED ORAL DAILY
Qty: 90 TABLET | Refills: 2 | Status: SHIPPED | OUTPATIENT
Start: 2024-02-28

## 2024-02-28 RX ORDER — LEVOTHYROXINE SODIUM 137 UG/1
TABLET ORAL
Qty: 90 TABLET | Refills: 0 | Status: SHIPPED | OUTPATIENT
Start: 2024-02-28 | End: 2024-03-08 | Stop reason: DRUGHIGH

## 2024-03-07 ENCOUNTER — LAB (OUTPATIENT)
Dept: LAB | Facility: LAB | Age: 69
End: 2024-03-07
Payer: MEDICARE

## 2024-03-07 ENCOUNTER — HOSPITAL ENCOUNTER (OUTPATIENT)
Dept: RADIOLOGY | Facility: CLINIC | Age: 69
Discharge: HOME | End: 2024-03-07
Payer: MEDICARE

## 2024-03-07 VITALS — WEIGHT: 231.48 LBS | BODY MASS INDEX: 37.2 KG/M2 | HEIGHT: 66 IN

## 2024-03-07 DIAGNOSIS — E03.9 HYPOTHYROIDISM, UNSPECIFIED TYPE: ICD-10-CM

## 2024-03-07 DIAGNOSIS — Z12.31 BREAST CANCER SCREENING BY MAMMOGRAM: ICD-10-CM

## 2024-03-07 LAB
T4 FREE SERPL-MCNC: 1.16 NG/DL (ref 0.78–1.48)
TSH SERPL-ACNC: 8.99 MIU/L (ref 0.44–3.98)

## 2024-03-07 PROCEDURE — 36415 COLL VENOUS BLD VENIPUNCTURE: CPT

## 2024-03-07 PROCEDURE — 84443 ASSAY THYROID STIM HORMONE: CPT

## 2024-03-07 PROCEDURE — 84439 ASSAY OF FREE THYROXINE: CPT

## 2024-03-07 PROCEDURE — 77063 BREAST TOMOSYNTHESIS BI: CPT | Performed by: RADIOLOGY

## 2024-03-07 PROCEDURE — 77067 SCR MAMMO BI INCL CAD: CPT

## 2024-03-07 PROCEDURE — 77067 SCR MAMMO BI INCL CAD: CPT | Performed by: RADIOLOGY

## 2024-03-07 RX ORDER — LEVOTHYROXINE SODIUM 137 UG/1
TABLET ORAL
Qty: 90 TABLET | Refills: 0 | Status: CANCELLED | OUTPATIENT
Start: 2024-03-07

## 2024-03-08 DIAGNOSIS — E03.9 HYPOTHYROIDISM, UNSPECIFIED TYPE: Primary | ICD-10-CM

## 2024-03-08 RX ORDER — LEVOTHYROXINE SODIUM 125 UG/1
125 TABLET ORAL DAILY
Qty: 90 TABLET | Refills: 0 | Status: SHIPPED | OUTPATIENT
Start: 2024-03-08 | End: 2024-05-14

## 2024-03-12 ENCOUNTER — EVALUATION (OUTPATIENT)
Dept: PHYSICAL THERAPY | Facility: CLINIC | Age: 69
End: 2024-03-12
Payer: MEDICARE

## 2024-03-12 DIAGNOSIS — M25.511 ACUTE PAIN OF RIGHT SHOULDER: ICD-10-CM

## 2024-03-12 PROCEDURE — 97161 PT EVAL LOW COMPLEX 20 MIN: CPT | Mod: GP | Performed by: PHYSICAL THERAPIST

## 2024-03-12 PROCEDURE — 97110 THERAPEUTIC EXERCISES: CPT | Mod: GP | Performed by: PHYSICAL THERAPIST

## 2024-03-12 NOTE — PROGRESS NOTES
Physical Therapy    Physical Therapy Evaluation and Treatment      Patient Name: Earline Villagran  MRN: [id number]  Today's Date: 3/13/2024       PT Evaluation Time Entry  PT Evaluation (Low) Time Entry: 25  PT Therapeutic Procedures Time Entry  Therapeutic Exercise Time Entry: 10  PT Modalities Time Entry  Hot/Cold Pack Time Entry: 10   Visit: 1    Assessment:  Pt presents to clinic with chief complaint of R shoulder pain that has been ongoing for at least the last 3 months. She demonstrates mild postural deficits, decreased R shoulder AROM, decreased cervical AROM, and decreased R UE strength. Pt will benefit from skilled therapy to address current impairments for reduced pain and improved ability to return to regular activities       Plan:  OP PT Plan  Treatment/Interventions: Cryotherapy, Education/ Instruction, Electrical stimulation, Gait training, Hot pack, Manual therapy, Neuromuscular re-education, Mechanical traction, Self care/ home management, Therapeutic activities, Therapeutic exercises  PT Plan: Skilled PT  PT Frequency: 1 time per week  Certification Period Start Date: 03/12/24  Certification Period End Date: 06/10/24  Rehab Potential: Good  Plan of Care Agreement: Patient    Current Problem:   1. Acute pain of right shoulder  Referral to Physical Therapy          Subjective    General:  General  Reason for Referral: R shoulder pain  Referred By: Dr. Willie Reyes  Preferred Learning Style: verbal, visual, written  Precautions:  Precautions  STEADI Fall Risk Score (The score of 4 or more indicates an increased risk of falling): 1  Precautions Comment: None      Chief complaints: Neck and R shoulder pain  Onset/Surgery Date: ongoing for at least a year  Pt states she was hit in the head with a basketball at work about a year ago and she went to PT, had a worker's comp case open and her shoulder and neck improved  Over the last couple of months she feels like the pain has returned   Mechanism of Injury:  pt was hit with a basketball while at work   Previous History: yes     Pt is R handed     Pain: 3/10      Location: R shoulder, R upper trap     Type: sharp pain     Aggravators: sleeping on R side, lifting, motion of R shoulder, self-care activities    Alleviators: Tylenol PM, Tylenol arthritis; BioFreeze       Pt does endorsee some N/T into the R UE      Function:    Prior Level: Fully functional     Current limitations: lifting, R shoulder motion, carrying items, self-care activities     Condition: unchanged      Sleep:     Disturbed: Yes    Preferred position(s): L side; if pt sleeps onto the R side, will wake up       Goals for Therapy:    To reduce R shoulder and neck pain        Objective      Observation/Posture:     Mild rounded shoulder posturing   Forward head posturing    Increased thoracic kyphosis   Bilat scapular abduction      ROM/Flexibility      Cervical spine:      Flexion: WNL       Ext:  Mild decrease; pain R upper trap       Rotation R/L:   Moderate limitation / WNL; pain R      SB R/L:  Moderate limitation / WNL; pain R          Shoulder Flexion R / L:            80 deg / 140 deg       Extension R / L :       50 deg / 60 deg       Abduction R / L :      80 deg / 150 deg       Up Back R / L:           to ant shoulder / to T10      Behind Head R / L:   to posterior glute / to C2     Strength R / L :     Serratus Anterior:             4+/ 4+, pain R  Shoulder Flexion:      4+ / 5, pain R    Shoulder Abduction:        4+ / 5    Shoulder ER:                     4+ / 5, mild pain    Shoulder IR:                      5 / 5    Elbow Flexion:                   5 / 5    Elbow Extension:               5 / 5       Neurological: Sensation is intact and symmetrical in BUEs.      Special Tests R / L :     Full can:                  + / -  Empty Can:             + / -    Griffiths Herbie:  + / +    IR Liftoff:                 Unable / -     Outcome Measure:    QuickDASH: 56.82 % impaired        TREATMENT  Therapeutic exercise:  Pendulums x10 ea direction 1 lb weight   Scapular retractions x 10; cuing   R UT stretch x 10 with 5 hold (to L only)       EDUCATION:  Potential causes of pain/symptoms      Goals:  Active       PT Problem       Pt will increase R shoulder abd, flexion to 120 deg for improved ability to perform overhead reaching tasks        Start:  03/13/24    Expected End:  06/10/24            Pt will increase R shoulder strength by 1 MMT grade for all weakened muscle groups for improved shoulder stability and ability to perform lifting tasks       Start:  03/13/24    Expected End:  06/10/24            Pt will increase R shoulder IR to L4 and ER to C3 for improved ability to perform self-care tasks        Start:  03/13/24    Expected End:  06/10/24            Pt will report 50% decrease in R shoulder and neck pain for improved ability to sleep comfortably        Start:  03/13/24    Expected End:  06/10/24            Pt will be independent in HEP for home maintenance        Start:  03/13/24    Expected End:  06/10/24

## 2024-03-13 ASSESSMENT — ENCOUNTER SYMPTOMS
LOSS OF SENSATION IN FEET: 0
OCCASIONAL FEELINGS OF UNSTEADINESS: 1
DEPRESSION: 0

## 2024-03-20 ENCOUNTER — TREATMENT (OUTPATIENT)
Dept: PHYSICAL THERAPY | Facility: CLINIC | Age: 69
End: 2024-03-20
Payer: MEDICARE

## 2024-03-20 DIAGNOSIS — M25.511 ACUTE PAIN OF RIGHT SHOULDER: Primary | ICD-10-CM

## 2024-03-20 PROCEDURE — 97110 THERAPEUTIC EXERCISES: CPT | Mod: GP | Performed by: PHYSICAL THERAPIST

## 2024-03-20 NOTE — PROGRESS NOTES
"Physical Therapy    Physical Therapy Treatment      Patient Name: Earline Villagran  MRN: [id number]  Today's Date: 3/20/2024  Time Calculation  Start Time: 0805  Stop Time: 0905  Time Calculation (min): 60 min       PT Therapeutic Procedures Time Entry  Therapeutic Exercise Time Entry: 38  PT Modalities Time Entry  Hot/Cold Pack Time Entry: 10   Visit: 2    Assessment:  Pt continues to have fairly acute pain with AAROM work. Will attempt PROM at follow up as well as continue with gentle AAROM as tolerated       Plan:  Attempt supine wand exercises  UBE  Manual        Current Problem:   1. Acute pain of right shoulder              Subjective    \"It's a little less achy at night\"       Objective   Unable to reach shoulder flexion to 90 deg actively d/t pain       TREATMENT  Therapeutic exercise:  Pendulums x10 ea direction 1 lb weight   Scapular retractions x 10; cuing   R UT stretch x 10 with 5 hold (to L only)   Dusting x 10 ea direction- no circles   Cervical flexion x 10   Pulleys x 10   Wand ext x 10   Wand IR x 10     MHP to R shoulder x 10 min EOS     Goals:  Active       PT Problem       Pt will increase R shoulder abd, flexion to 120 deg for improved ability to perform overhead reaching tasks        Start:  03/13/24    Expected End:  06/10/24            Pt will increase R shoulder strength by 1 MMT grade for all weakened muscle groups for improved shoulder stability and ability to perform lifting tasks       Start:  03/13/24    Expected End:  06/10/24            Pt will increase R shoulder IR to L4 and ER to C3 for improved ability to perform self-care tasks        Start:  03/13/24    Expected End:  06/10/24            Pt will report 50% decrease in R shoulder and neck pain for improved ability to sleep comfortably        Start:  03/13/24    Expected End:  06/10/24            Pt will be independent in HEP for home maintenance        Start:  03/13/24    Expected End:  06/10/24                    "

## 2024-03-27 ENCOUNTER — TREATMENT (OUTPATIENT)
Dept: PHYSICAL THERAPY | Facility: CLINIC | Age: 69
End: 2024-03-27
Payer: MEDICARE

## 2024-03-27 DIAGNOSIS — M25.511 ACUTE PAIN OF RIGHT SHOULDER: Primary | ICD-10-CM

## 2024-03-27 PROCEDURE — 97110 THERAPEUTIC EXERCISES: CPT | Mod: GP | Performed by: PHYSICAL THERAPIST

## 2024-03-27 NOTE — PROGRESS NOTES
"Physical Therapy    Physical Therapy Treatment      Patient Name: Earline Villagran  MRN: [id number]  Today's Date: 3/27/2024  Time Calculation  Start Time: 0805  Stop Time: 0905  Time Calculation (min): 60 min    PT Therapeutic Procedures Time Entry  Therapeutic Exercise Time Entry: 40      Visit: 3    Assessment:  Pt demonstrates improvement in AAROM. Pt progressing well       Plan:  Attempt supine wand exercises  Wall wash       Current Problem:   1. Acute pain of right shoulder                Subjective    \"I don't have to take the medicine any more\"        Objective   PROM R shoulder flexion 120 deg in pulleys       TREATMENT  Therapeutic exercise:  Pendulums x10 ea direction  Dusting x 10 ea direction  Scapular retractions x 10  Wand IR x 10   Wand Ext x 10   R UT stretch x 10 with 5 hold (to L only)   Cervical flexion x 10   Pulleys x 10   UBE 2/2    MHP to R shoulder x 10 min EOS     Goals:  Active       PT Problem       Pt will increase R shoulder abd, flexion to 120 deg for improved ability to perform overhead reaching tasks        Start:  03/13/24    Expected End:  06/10/24            Pt will increase R shoulder strength by 1 MMT grade for all weakened muscle groups for improved shoulder stability and ability to perform lifting tasks       Start:  03/13/24    Expected End:  06/10/24            Pt will increase R shoulder IR to L4 and ER to C3 for improved ability to perform self-care tasks        Start:  03/13/24    Expected End:  06/10/24            Pt will report 50% decrease in R shoulder and neck pain for improved ability to sleep comfortably        Start:  03/13/24    Expected End:  06/10/24            Pt will be independent in HEP for home maintenance        Start:  03/13/24    Expected End:  06/10/24                  "

## 2024-04-01 DIAGNOSIS — N18.30 CKD STAGE 3 DUE TO TYPE 2 DIABETES MELLITUS (MULTI): ICD-10-CM

## 2024-04-01 DIAGNOSIS — E11.22 CKD STAGE 3 DUE TO TYPE 2 DIABETES MELLITUS (MULTI): ICD-10-CM

## 2024-04-01 RX ORDER — SITAGLIPTIN 100 MG/1
100 TABLET, FILM COATED ORAL DAILY
Qty: 90 TABLET | Refills: 1 | Status: SHIPPED | OUTPATIENT
Start: 2024-04-01 | End: 2024-05-28 | Stop reason: WASHOUT

## 2024-04-02 ENCOUNTER — TREATMENT (OUTPATIENT)
Dept: PHYSICAL THERAPY | Facility: CLINIC | Age: 69
End: 2024-04-02
Payer: MEDICARE

## 2024-04-02 DIAGNOSIS — M25.511 ACUTE PAIN OF RIGHT SHOULDER: Primary | ICD-10-CM

## 2024-04-02 PROCEDURE — 97110 THERAPEUTIC EXERCISES: CPT | Mod: GP | Performed by: PHYSICAL THERAPIST

## 2024-04-02 NOTE — PROGRESS NOTES
"Physical Therapy    Physical Therapy Treatment      Patient Name: Earline Villagran  MRN: [id number]  Today's Date: 4/2/2024  Time Calculation  Start Time: 0750  Stop Time: 0830  Time Calculation (min): 40 min    PT Therapeutic Procedures Time Entry  Therapeutic Exercise Time Entry: 30      Visit: 4    Assessment:  Pt able to gently progress with AAROM exercise. Unable to perform supine wand exercises d/t significant increase in pain with performance  Limited time today d/t pt needing to leave for work   Will continue to progress as tolerated       Plan:  Manual   Gentle banded strengthening- rows  Shoulder isos       Current Problem:   1. Acute pain of right shoulder              Subjective    \"Last night it was really hurting me\"        Objective   Severe limitations noted R shoulder IR       TREATMENT  Therapeutic exercise:  Pendulums x10 ea direction  Dusting x 10 ea direction  Wand IR x 10   Wand Ext x 10   Attempted supine press up  Wall wash x 10   Scapular retractions x 10  R UT stretch x 10 with 5 hold (to L only)   Cervical flexion x 10   Pulleys x 10   UBE 2/2- not today    MHP to R shoulder x 10 min EOS     Goals:  Active       PT Problem       Pt will increase R shoulder abd, flexion to 120 deg for improved ability to perform overhead reaching tasks        Start:  03/13/24    Expected End:  06/10/24            Pt will increase R shoulder strength by 1 MMT grade for all weakened muscle groups for improved shoulder stability and ability to perform lifting tasks       Start:  03/13/24    Expected End:  06/10/24            Pt will increase R shoulder IR to L4 and ER to C3 for improved ability to perform self-care tasks        Start:  03/13/24    Expected End:  06/10/24            Pt will report 50% decrease in R shoulder and neck pain for improved ability to sleep comfortably        Start:  03/13/24    Expected End:  06/10/24            Pt will be independent in HEP for home maintenance        Start:  " 03/13/24    Expected End:  06/10/24

## 2024-04-09 ENCOUNTER — TREATMENT (OUTPATIENT)
Dept: PHYSICAL THERAPY | Facility: CLINIC | Age: 69
End: 2024-04-09
Payer: MEDICARE

## 2024-04-09 DIAGNOSIS — M25.511 ACUTE PAIN OF RIGHT SHOULDER: ICD-10-CM

## 2024-04-09 PROCEDURE — 97140 MANUAL THERAPY 1/> REGIONS: CPT | Mod: GP | Performed by: PHYSICAL THERAPIST

## 2024-04-09 PROCEDURE — 97110 THERAPEUTIC EXERCISES: CPT | Mod: GP | Performed by: PHYSICAL THERAPIST

## 2024-04-09 NOTE — PROGRESS NOTES
"Physical Therapy    Physical Therapy Treatment      Patient Name: Earline Villagran  MRN: [id number]  Today's Date: 4/9/2024  Time Calculation  Start Time: 0810  Stop Time: 0845  Time Calculation (min): 35 min    PT Therapeutic Procedures Time Entry  Manual Therapy Time Entry: 13  Therapeutic Exercise Time Entry: 10      Visit: 5    Assessment:  Significant pain today per pt. PROM painful and with empty end feel. Limited tolerance to exercise today.   Will attempt to progress at next session  Limited carryover between sessions       Plan:  All rx as able       Current Problem:   1. Acute pain of right shoulder  Follow Up In Physical Therapy            Subjective    \"It woke me up from my sleep\"        Objective   PROM empty end feel abd, flexion      TREATMENT  Therapeutic exercise:  Scapular retractions x 10  R UT stretch x 10 with 5 hold (to L only)   Cervical flexion x 10     Not today:   Pendulums x10 ea direction  Dusting x 10 ea direction  Wand IR x 10   Wand Ext x 10   Attempted supine press up  Wall wash x 10   Pulleys x 10   UBE 2/2    Manual therapy:  Gentle long axis distraction   Grade I inferior glides     MHP to R shoulder x 10 min EOS     Goals:  Active       PT Problem       Pt will increase R shoulder abd, flexion to 120 deg for improved ability to perform overhead reaching tasks        Start:  03/13/24    Expected End:  06/10/24            Pt will increase R shoulder strength by 1 MMT grade for all weakened muscle groups for improved shoulder stability and ability to perform lifting tasks       Start:  03/13/24    Expected End:  06/10/24            Pt will increase R shoulder IR to L4 and ER to C3 for improved ability to perform self-care tasks        Start:  03/13/24    Expected End:  06/10/24            Pt will report 50% decrease in R shoulder and neck pain for improved ability to sleep comfortably        Start:  03/13/24    Expected End:  06/10/24            Pt will be independent in HEP for " home maintenance        Start:  03/13/24    Expected End:  06/10/24

## 2024-04-15 DIAGNOSIS — R09.81 SINUS CONGESTION: ICD-10-CM

## 2024-04-15 RX ORDER — FLUTICASONE PROPIONATE 50 MCG
2 SPRAY, SUSPENSION (ML) NASAL DAILY
Qty: 16 G | Refills: 1 | Status: SHIPPED | OUTPATIENT
Start: 2024-04-15

## 2024-04-30 ENCOUNTER — TREATMENT (OUTPATIENT)
Dept: PHYSICAL THERAPY | Facility: CLINIC | Age: 69
End: 2024-04-30
Payer: MEDICARE

## 2024-04-30 DIAGNOSIS — M25.511 ACUTE PAIN OF RIGHT SHOULDER: Primary | ICD-10-CM

## 2024-04-30 PROCEDURE — 97110 THERAPEUTIC EXERCISES: CPT | Mod: GP | Performed by: PHYSICAL THERAPIST

## 2024-04-30 NOTE — PROGRESS NOTES
"Physical Therapy    Physical Therapy Treatment      Patient Name: Earline Villagran  MRN: [id number]  Today's Date: 4/30/2024  Time Calculation  Start Time: 0745  Stop Time: 0830  Time Calculation (min): 45 min    PT Therapeutic Procedures Time Entry  Therapeutic Exercise Time Entry: 30      Visit: 6    Assessment:  Pt continues to have significant pain with AAROM and with AROM of R shoulder. Limited to no carryover between sessions and significant difficulty with attempted progression of exercise  Discussed pt should follow up again with ortho for further evaluation       Plan:  Will keep scheduled follow up  Instructed pt to call to follow up with ortho; no improvement       Current Problem:   1. Acute pain of right shoulder                Subjective     \"I was having night pain, I don't know what that is\"         Objective   R shoulder flexion active 100 deg  R shoulder abd active 90 deg       TREATMENT  Therapeutic exercise:  Attempted wand press up   Dusting x 10 ea direction  R UT stretch x 10 with 5 hold (to L only)   Cervical flexion x 10   Wand IR x 10   Wand Ext x 10   Pulleys x 7     Not today:   Pendulums x10 ea direction  Attempted supine press up  Wall wash x 10   UBE 2/2  Scapular retractions       MHP to R shoulder x 10 min EOS     Goals:  Active       PT Problem       Pt will increase R shoulder abd, flexion to 120 deg for improved ability to perform overhead reaching tasks        Start:  03/13/24    Expected End:  06/10/24            Pt will increase R shoulder strength by 1 MMT grade for all weakened muscle groups for improved shoulder stability and ability to perform lifting tasks       Start:  03/13/24    Expected End:  06/10/24            Pt will increase R shoulder IR to L4 and ER to C3 for improved ability to perform self-care tasks        Start:  03/13/24    Expected End:  06/10/24            Pt will report 50% decrease in R shoulder and neck pain for improved ability to sleep comfortably   "      Start:  03/13/24    Expected End:  06/10/24            Pt will be independent in HEP for home maintenance        Start:  03/13/24    Expected End:  06/10/24

## 2024-05-04 DIAGNOSIS — N18.30 CKD STAGE 3 DUE TO TYPE 2 DIABETES MELLITUS (MULTI): ICD-10-CM

## 2024-05-04 DIAGNOSIS — E11.22 CKD STAGE 3 DUE TO TYPE 2 DIABETES MELLITUS (MULTI): ICD-10-CM

## 2024-05-04 RX ORDER — SEMAGLUTIDE 1.34 MG/ML
0.5 INJECTION, SOLUTION SUBCUTANEOUS
Qty: 6 EACH | Refills: 0 | Status: SHIPPED | OUTPATIENT
Start: 2024-05-05 | End: 2024-05-30 | Stop reason: WASHOUT

## 2024-05-08 ENCOUNTER — TREATMENT (OUTPATIENT)
Dept: PHYSICAL THERAPY | Facility: CLINIC | Age: 69
End: 2024-05-08
Payer: MEDICARE

## 2024-05-08 DIAGNOSIS — M25.511 ACUTE PAIN OF RIGHT SHOULDER: Primary | ICD-10-CM

## 2024-05-08 PROCEDURE — 97110 THERAPEUTIC EXERCISES: CPT | Mod: GP | Performed by: PHYSICAL THERAPIST

## 2024-05-08 NOTE — PROGRESS NOTES
"Physical Therapy    Physical Therapy Treatment      Patient Name: Earline Villagran  MRN: [id number]  Today's Date: 5/8/2024  Time Calculation  Start Time: 0805  Stop Time: 0835  Time Calculation (min): 30 min    PT Therapeutic Procedures Time Entry  Therapeutic Exercise Time Entry: 25      Visit: 7    Assessment:  Pt reports improvement in pain symptoms, however, minimal improvement in AROM and function of R shoulder/UE  Unable to progress through much exercise or to strengthening d/t pain with AAROM. At this time, pt is going to follow up with ortho and then call to follow up with therapy      Plan:  Pt will call to follow up after she sees MD       Current Problem:   1. Acute pain of right shoulder              Subjective     \"I haven't had to take the pain pills for 2 nights\"         Objective   R shoulder flexion active 100 deg  R shoulder abd active 80 deg     Pain end range       TREATMENT  Therapeutic exercise:  Pendulums x 10 ea direction  Standing scapular retractions, cuing x 10   Dusting x 10 ea direction  R UT stretch x 10 with 5 hold (to L only)   Cervical flexion x 10   Chin tucks x 10   UBE attempted   Pulleys x 10     Not today:   Wall wash x 10   Wand IR x 10   Wand Ext x 10     Goals:  Active       PT Problem       Pt will increase R shoulder abd, flexion to 120 deg for improved ability to perform overhead reaching tasks        Start:  03/13/24    Expected End:  06/10/24            Pt will increase R shoulder strength by 1 MMT grade for all weakened muscle groups for improved shoulder stability and ability to perform lifting tasks       Start:  03/13/24    Expected End:  06/10/24            Pt will increase R shoulder IR to L4 and ER to C3 for improved ability to perform self-care tasks        Start:  03/13/24    Expected End:  06/10/24            Pt will report 50% decrease in R shoulder and neck pain for improved ability to sleep comfortably        Start:  03/13/24    Expected End:  06/10/24    "         Pt will be independent in HEP for home maintenance        Start:  03/13/24    Expected End:  06/10/24

## 2024-05-14 DIAGNOSIS — E03.9 HYPOTHYROIDISM, UNSPECIFIED TYPE: ICD-10-CM

## 2024-05-14 RX ORDER — LEVOTHYROXINE SODIUM 125 UG/1
125 TABLET ORAL DAILY
Qty: 90 TABLET | Refills: 0 | Status: SHIPPED | OUTPATIENT
Start: 2024-05-14

## 2024-05-19 ENCOUNTER — APPOINTMENT (OUTPATIENT)
Dept: RADIOLOGY | Facility: HOSPITAL | Age: 69
End: 2024-05-19
Payer: MEDICARE

## 2024-05-19 ENCOUNTER — CLINICAL SUPPORT (OUTPATIENT)
Dept: EMERGENCY MEDICINE | Facility: HOSPITAL | Age: 69
End: 2024-05-19
Payer: MEDICARE

## 2024-05-19 ENCOUNTER — HOSPITAL ENCOUNTER (OUTPATIENT)
Facility: HOSPITAL | Age: 69
Setting detail: OBSERVATION
Discharge: HOME | End: 2024-05-20
Attending: EMERGENCY MEDICINE | Admitting: NURSE PRACTITIONER
Payer: MEDICARE

## 2024-05-19 DIAGNOSIS — R07.2 PRECORDIAL PAIN: ICD-10-CM

## 2024-05-19 DIAGNOSIS — R73.9 HYPERGLYCEMIA: ICD-10-CM

## 2024-05-19 DIAGNOSIS — R55 NEAR SYNCOPE: ICD-10-CM

## 2024-05-19 DIAGNOSIS — R42 DIZZINESS: ICD-10-CM

## 2024-05-19 DIAGNOSIS — R07.9 CHEST PAIN, UNSPECIFIED TYPE: ICD-10-CM

## 2024-05-19 DIAGNOSIS — I50.30 DIASTOLIC CONGESTIVE HEART FAILURE, UNSPECIFIED HF CHRONICITY (MULTI): ICD-10-CM

## 2024-05-19 PROBLEM — K21.9 GASTROESOPHAGEAL REFLUX DISEASE: Status: ACTIVE | Noted: 2021-12-23

## 2024-05-19 PROBLEM — B02.9 HERPES ZOSTER: Status: ACTIVE | Noted: 2024-05-19

## 2024-05-19 PROBLEM — E03.9 HYPOTHYROIDISM: Status: ACTIVE | Noted: 2024-05-19

## 2024-05-19 LAB
ALBUMIN SERPL BCP-MCNC: 3.8 G/DL (ref 3.4–5)
ALP SERPL-CCNC: 79 U/L (ref 33–136)
ALT SERPL W P-5'-P-CCNC: 14 U/L (ref 7–45)
ANION GAP BLDV CALCULATED.4IONS-SCNC: 8 MMOL/L (ref 10–25)
ANION GAP SERPL CALC-SCNC: 17 MMOL/L (ref 10–20)
AST SERPL W P-5'-P-CCNC: 13 U/L (ref 9–39)
BASE EXCESS BLDV CALC-SCNC: 3.9 MMOL/L (ref -2–3)
BASOPHILS # BLD AUTO: 0.06 X10*3/UL (ref 0–0.1)
BASOPHILS NFR BLD AUTO: 0.7 %
BILIRUB SERPL-MCNC: 0.4 MG/DL (ref 0–1.2)
BODY TEMPERATURE: 37 DEGREES CELSIUS
BUN SERPL-MCNC: 20 MG/DL (ref 6–23)
CA-I BLDV-SCNC: 1.13 MMOL/L (ref 1.1–1.33)
CALCIUM SERPL-MCNC: 8.8 MG/DL (ref 8.6–10.6)
CARDIAC TROPONIN I PNL SERPL HS: 6 NG/L (ref 0–34)
CARDIAC TROPONIN I PNL SERPL HS: 6 NG/L (ref 0–34)
CHLORIDE BLDV-SCNC: 97 MMOL/L (ref 98–107)
CHLORIDE SERPL-SCNC: 95 MMOL/L (ref 98–107)
CO2 SERPL-SCNC: 22 MMOL/L (ref 21–32)
CREAT SERPL-MCNC: 1.22 MG/DL (ref 0.5–1.05)
EGFRCR SERPLBLD CKD-EPI 2021: 48 ML/MIN/1.73M*2
EOSINOPHIL # BLD AUTO: 0.08 X10*3/UL (ref 0–0.7)
EOSINOPHIL NFR BLD AUTO: 0.9 %
ERYTHROCYTE [DISTWIDTH] IN BLOOD BY AUTOMATED COUNT: 12 % (ref 11.5–14.5)
EST. AVERAGE GLUCOSE BLD GHB EST-MCNC: 258 MG/DL
FLUAV RNA RESP QL NAA+PROBE: NOT DETECTED
FLUBV RNA RESP QL NAA+PROBE: NOT DETECTED
GLUCOSE BLD MANUAL STRIP-MCNC: 188 MG/DL (ref 74–99)
GLUCOSE BLD MANUAL STRIP-MCNC: 223 MG/DL (ref 74–99)
GLUCOSE BLD MANUAL STRIP-MCNC: 263 MG/DL (ref 74–99)
GLUCOSE BLDV-MCNC: 447 MG/DL (ref 74–99)
GLUCOSE SERPL-MCNC: 480 MG/DL (ref 74–99)
HBA1C MFR BLD: 10.6 %
HCO3 BLDV-SCNC: 28.5 MMOL/L (ref 22–26)
HCT VFR BLD AUTO: 35.4 % (ref 36–46)
HCT VFR BLD EST: 42 % (ref 36–46)
HGB BLD-MCNC: 12.7 G/DL (ref 12–16)
HGB BLDV-MCNC: 13.9 G/DL (ref 12–16)
IMM GRANULOCYTES # BLD AUTO: 0.07 X10*3/UL (ref 0–0.7)
IMM GRANULOCYTES NFR BLD AUTO: 0.8 % (ref 0–0.9)
LACTATE BLDV-SCNC: 2.7 MMOL/L (ref 0.4–2)
LYMPHOCYTES # BLD AUTO: 1.13 X10*3/UL (ref 1.2–4.8)
LYMPHOCYTES NFR BLD AUTO: 13 %
MAGNESIUM SERPL-MCNC: 1.61 MG/DL (ref 1.6–2.4)
MCH RBC QN AUTO: 30.2 PG (ref 26–34)
MCHC RBC AUTO-ENTMCNC: 35.9 G/DL (ref 32–36)
MCV RBC AUTO: 84 FL (ref 80–100)
MONOCYTES # BLD AUTO: 0.49 X10*3/UL (ref 0.1–1)
MONOCYTES NFR BLD AUTO: 5.6 %
NEUTROPHILS # BLD AUTO: 6.85 X10*3/UL (ref 1.2–7.7)
NEUTROPHILS NFR BLD AUTO: 79 %
NRBC BLD-RTO: 0 /100 WBCS (ref 0–0)
OXYHGB MFR BLDV: 67.3 % (ref 45–75)
PCO2 BLDV: 42 MM HG (ref 41–51)
PH BLDV: 7.44 PH (ref 7.33–7.43)
PHOSPHATE SERPL-MCNC: 3.5 MG/DL (ref 2.5–4.9)
PLATELET # BLD AUTO: ABNORMAL 10*3/UL
PLATELET CLUMP BLD QL SMEAR: PRESENT
PO2 BLDV: 45 MM HG (ref 35–45)
POTASSIUM BLDV-SCNC: 4.5 MMOL/L (ref 3.5–5.3)
POTASSIUM SERPL-SCNC: 4 MMOL/L (ref 3.5–5.3)
PROT SERPL-MCNC: 7.3 G/DL (ref 6.4–8.2)
RBC # BLD AUTO: 4.21 X10*6/UL (ref 4–5.2)
RBC MORPH BLD: NORMAL
SAO2 % BLDV: 68 % (ref 45–75)
SARS-COV-2 RNA RESP QL NAA+PROBE: NOT DETECTED
SODIUM BLDV-SCNC: 129 MMOL/L (ref 136–145)
SODIUM SERPL-SCNC: 130 MMOL/L (ref 136–145)
T4 FREE SERPL-MCNC: 1.58 NG/DL (ref 0.78–1.48)
TSH SERPL-ACNC: 7.03 MIU/L (ref 0.44–3.98)
WBC # BLD AUTO: 8.7 X10*3/UL (ref 4.4–11.3)

## 2024-05-19 PROCEDURE — 83036 HEMOGLOBIN GLYCOSYLATED A1C: CPT | Performed by: NURSE PRACTITIONER

## 2024-05-19 PROCEDURE — 84075 ASSAY ALKALINE PHOSPHATASE: CPT

## 2024-05-19 PROCEDURE — 99222 1ST HOSP IP/OBS MODERATE 55: CPT | Performed by: NURSE PRACTITIONER

## 2024-05-19 PROCEDURE — 82947 ASSAY GLUCOSE BLOOD QUANT: CPT | Mod: 91

## 2024-05-19 PROCEDURE — 2500000002 HC RX 250 W HCPCS SELF ADMINISTERED DRUGS (ALT 637 FOR MEDICARE OP, ALT 636 FOR OP/ED): Performed by: NURSE PRACTITIONER

## 2024-05-19 PROCEDURE — 96374 THER/PROPH/DIAG INJ IV PUSH: CPT | Performed by: NURSE PRACTITIONER

## 2024-05-19 PROCEDURE — 93010 ELECTROCARDIOGRAM REPORT: CPT

## 2024-05-19 PROCEDURE — 84132 ASSAY OF SERUM POTASSIUM: CPT | Mod: 91

## 2024-05-19 PROCEDURE — 2500000004 HC RX 250 GENERAL PHARMACY W/ HCPCS (ALT 636 FOR OP/ED): Performed by: NURSE PRACTITIONER

## 2024-05-19 PROCEDURE — 93005 ELECTROCARDIOGRAM TRACING: CPT

## 2024-05-19 PROCEDURE — 36415 COLL VENOUS BLD VENIPUNCTURE: CPT

## 2024-05-19 PROCEDURE — 99285 EMERGENCY DEPT VISIT HI MDM: CPT | Mod: 25

## 2024-05-19 PROCEDURE — 71046 X-RAY EXAM CHEST 2 VIEWS: CPT

## 2024-05-19 PROCEDURE — 87636 SARSCOV2 & INF A&B AMP PRB: CPT

## 2024-05-19 PROCEDURE — G0378 HOSPITAL OBSERVATION PER HR: HCPCS

## 2024-05-19 PROCEDURE — 96361 HYDRATE IV INFUSION ADD-ON: CPT | Performed by: NURSE PRACTITIONER

## 2024-05-19 PROCEDURE — 2500000002 HC RX 250 W HCPCS SELF ADMINISTERED DRUGS (ALT 637 FOR MEDICARE OP, ALT 636 FOR OP/ED)

## 2024-05-19 PROCEDURE — 71046 X-RAY EXAM CHEST 2 VIEWS: CPT | Performed by: STUDENT IN AN ORGANIZED HEALTH CARE EDUCATION/TRAINING PROGRAM

## 2024-05-19 PROCEDURE — 84443 ASSAY THYROID STIM HORMONE: CPT | Performed by: NURSE PRACTITIONER

## 2024-05-19 PROCEDURE — 84100 ASSAY OF PHOSPHORUS: CPT

## 2024-05-19 PROCEDURE — 84484 ASSAY OF TROPONIN QUANT: CPT

## 2024-05-19 PROCEDURE — 96372 THER/PROPH/DIAG INJ SC/IM: CPT | Mod: 59 | Performed by: NURSE PRACTITIONER

## 2024-05-19 PROCEDURE — 85025 COMPLETE CBC W/AUTO DIFF WBC: CPT

## 2024-05-19 PROCEDURE — 93010 ELECTROCARDIOGRAM REPORT: CPT | Performed by: EMERGENCY MEDICINE

## 2024-05-19 PROCEDURE — 2500000004 HC RX 250 GENERAL PHARMACY W/ HCPCS (ALT 636 FOR OP/ED): Performed by: STUDENT IN AN ORGANIZED HEALTH CARE EDUCATION/TRAINING PROGRAM

## 2024-05-19 PROCEDURE — 83735 ASSAY OF MAGNESIUM: CPT

## 2024-05-19 PROCEDURE — 96375 TX/PRO/DX INJ NEW DRUG ADDON: CPT | Performed by: NURSE PRACTITIONER

## 2024-05-19 PROCEDURE — 2500000004 HC RX 250 GENERAL PHARMACY W/ HCPCS (ALT 636 FOR OP/ED)

## 2024-05-19 PROCEDURE — 84439 ASSAY OF FREE THYROXINE: CPT | Performed by: NURSE PRACTITIONER

## 2024-05-19 PROCEDURE — 99285 EMERGENCY DEPT VISIT HI MDM: CPT | Performed by: EMERGENCY MEDICINE

## 2024-05-19 PROCEDURE — 2500000001 HC RX 250 WO HCPCS SELF ADMINISTERED DRUGS (ALT 637 FOR MEDICARE OP): Performed by: NURSE PRACTITIONER

## 2024-05-19 RX ORDER — HEPARIN SODIUM 5000 [USP'U]/ML
5000 INJECTION, SOLUTION INTRAVENOUS; SUBCUTANEOUS EVERY 8 HOURS
Status: DISCONTINUED | OUTPATIENT
Start: 2024-05-19 | End: 2024-05-20 | Stop reason: HOSPADM

## 2024-05-19 RX ORDER — POLYETHYLENE GLYCOL 3350 17 G/17G
17 POWDER, FOR SOLUTION ORAL DAILY PRN
Status: DISCONTINUED | OUTPATIENT
Start: 2024-05-19 | End: 2024-05-20 | Stop reason: HOSPADM

## 2024-05-19 RX ORDER — ACETAMINOPHEN 325 MG/1
650 TABLET ORAL EVERY 6 HOURS PRN
Status: DISCONTINUED | OUTPATIENT
Start: 2024-05-19 | End: 2024-05-19

## 2024-05-19 RX ORDER — DEXTROSE 50 % IN WATER (D50W) INTRAVENOUS SYRINGE
25
Status: DISCONTINUED | OUTPATIENT
Start: 2024-05-19 | End: 2024-05-20 | Stop reason: HOSPADM

## 2024-05-19 RX ORDER — INSULIN LISPRO 100 [IU]/ML
0-10 INJECTION, SOLUTION INTRAVENOUS; SUBCUTANEOUS
Status: DISCONTINUED | OUTPATIENT
Start: 2024-05-19 | End: 2024-05-20 | Stop reason: HOSPADM

## 2024-05-19 RX ORDER — ATORVASTATIN CALCIUM 20 MG/1
40 TABLET, FILM COATED ORAL NIGHTLY
Status: DISCONTINUED | OUTPATIENT
Start: 2024-05-19 | End: 2024-05-20 | Stop reason: HOSPADM

## 2024-05-19 RX ORDER — HYDROCHLOROTHIAZIDE 25 MG/1
25 TABLET ORAL DAILY
Status: DISCONTINUED | OUTPATIENT
Start: 2024-05-20 | End: 2024-05-20 | Stop reason: HOSPADM

## 2024-05-19 RX ORDER — ORPHENADRINE CITRATE 30 MG/ML
60 INJECTION INTRAMUSCULAR; INTRAVENOUS ONCE
Status: COMPLETED | OUTPATIENT
Start: 2024-05-19 | End: 2024-05-19

## 2024-05-19 RX ORDER — INSULIN LISPRO 100 [IU]/ML
0-5 INJECTION, SOLUTION INTRAVENOUS; SUBCUTANEOUS EVERY 4 HOURS
Status: DISCONTINUED | OUTPATIENT
Start: 2024-05-19 | End: 2024-05-19

## 2024-05-19 RX ORDER — ACETAMINOPHEN 325 MG/1
975 TABLET ORAL EVERY 6 HOURS PRN
Status: DISCONTINUED | OUTPATIENT
Start: 2024-05-19 | End: 2024-05-20 | Stop reason: HOSPADM

## 2024-05-19 RX ORDER — ASPIRIN 81 MG/1
81 TABLET ORAL DAILY
Status: DISCONTINUED | OUTPATIENT
Start: 2024-05-20 | End: 2024-05-20 | Stop reason: HOSPADM

## 2024-05-19 RX ORDER — AMLODIPINE BESYLATE 5 MG/1
5 TABLET ORAL DAILY
Status: DISCONTINUED | OUTPATIENT
Start: 2024-05-20 | End: 2024-05-20 | Stop reason: HOSPADM

## 2024-05-19 RX ORDER — OXYCODONE HYDROCHLORIDE 5 MG/1
5 TABLET ORAL EVERY 8 HOURS PRN
Status: DISCONTINUED | OUTPATIENT
Start: 2024-05-19 | End: 2024-05-20 | Stop reason: HOSPADM

## 2024-05-19 RX ORDER — FLUTICASONE PROPIONATE 50 MCG
2 SPRAY, SUSPENSION (ML) NASAL DAILY
Status: DISCONTINUED | OUTPATIENT
Start: 2024-05-20 | End: 2024-05-20 | Stop reason: HOSPADM

## 2024-05-19 RX ORDER — LEVOTHYROXINE SODIUM 125 UG/1
125 TABLET ORAL DAILY
Status: DISCONTINUED | OUTPATIENT
Start: 2024-05-20 | End: 2024-05-20 | Stop reason: HOSPADM

## 2024-05-19 RX ORDER — DULAGLUTIDE 3 MG/.5ML
3 INJECTION, SOLUTION SUBCUTANEOUS
COMMUNITY

## 2024-05-19 RX ORDER — HYDROMORPHONE HYDROCHLORIDE 1 MG/ML
0.5 INJECTION, SOLUTION INTRAMUSCULAR; INTRAVENOUS; SUBCUTANEOUS ONCE
Status: COMPLETED | OUTPATIENT
Start: 2024-05-19 | End: 2024-05-19

## 2024-05-19 RX ORDER — ACETAMINOPHEN 500 MG
5 TABLET ORAL NIGHTLY PRN
Status: DISCONTINUED | OUTPATIENT
Start: 2024-05-19 | End: 2024-05-20 | Stop reason: HOSPADM

## 2024-05-19 RX ORDER — CYCLOBENZAPRINE HCL 10 MG
10 TABLET ORAL 3 TIMES DAILY PRN
Status: DISCONTINUED | OUTPATIENT
Start: 2024-05-19 | End: 2024-05-20 | Stop reason: HOSPADM

## 2024-05-19 RX ADMIN — HYDROMORPHONE HYDROCHLORIDE 0.5 MG: 1 INJECTION, SOLUTION INTRAMUSCULAR; INTRAVENOUS; SUBCUTANEOUS at 12:58

## 2024-05-19 RX ADMIN — ATORVASTATIN CALCIUM 40 MG: 20 TABLET, FILM COATED ORAL at 20:33

## 2024-05-19 RX ADMIN — ORPHENADRINE CITRATE 60 MG: 60 INJECTION INTRAMUSCULAR; INTRAVENOUS at 15:01

## 2024-05-19 RX ADMIN — OXYCODONE HYDROCHLORIDE 5 MG: 5 TABLET ORAL at 20:57

## 2024-05-19 RX ADMIN — INSULIN LISPRO 3 UNITS: 100 INJECTION, SOLUTION INTRAVENOUS; SUBCUTANEOUS at 14:45

## 2024-05-19 RX ADMIN — INSULIN LISPRO 4 UNITS: 100 INJECTION, SOLUTION INTRAVENOUS; SUBCUTANEOUS at 20:32

## 2024-05-19 RX ADMIN — CYCLOBENZAPRINE 10 MG: 10 TABLET, FILM COATED ORAL at 23:35

## 2024-05-19 RX ADMIN — HEPARIN SODIUM 5000 UNITS: 5000 INJECTION INTRAVENOUS; SUBCUTANEOUS at 20:32

## 2024-05-19 RX ADMIN — SODIUM CHLORIDE 1000 ML: 0.9 INJECTION, SOLUTION INTRAVENOUS at 10:35

## 2024-05-19 ASSESSMENT — LIFESTYLE VARIABLES
HAVE PEOPLE ANNOYED YOU BY CRITICIZING YOUR DRINKING: NO
TOTAL SCORE: 0
EVER HAD A DRINK FIRST THING IN THE MORNING TO STEADY YOUR NERVES TO GET RID OF A HANGOVER: NO
EVER FELT BAD OR GUILTY ABOUT YOUR DRINKING: NO
HAVE YOU EVER FELT YOU SHOULD CUT DOWN ON YOUR DRINKING: NO

## 2024-05-19 ASSESSMENT — ENCOUNTER SYMPTOMS
VOMITING: 0
FEVER: 0
ABDOMINAL PAIN: 0
DIFFICULTY URINATING: 0
POLYPHAGIA: 1
DYSURIA: 0
POLYDIPSIA: 1
NAUSEA: 0
CHILLS: 0
DIZZINESS: 1
LIGHT-HEADEDNESS: 1
MYALGIAS: 1
DIARRHEA: 0
SHORTNESS OF BREATH: 1
HEADACHES: 0
CONFUSION: 0
COUGH: 0

## 2024-05-19 ASSESSMENT — HEART SCORE
ECG: NON-SPECIFIC REPOLARIZATION DISTURBANCE
AGE: 65+
TROPONIN: LESS THAN OR EQUAL TO NORMAL LIMIT
HISTORY: SLIGHTLY SUSPICIOUS
HEART SCORE: 5
RISK FACTORS: >2 RISK FACTORS OR HX OF ATHEROSCLEROTIC DISEASE

## 2024-05-19 ASSESSMENT — PAIN DESCRIPTION - PAIN TYPE
TYPE: ACUTE PAIN
TYPE: ACUTE PAIN

## 2024-05-19 ASSESSMENT — PAIN DESCRIPTION - LOCATION
LOCATION: ARM

## 2024-05-19 ASSESSMENT — PAIN DESCRIPTION - DESCRIPTORS: DESCRIPTORS: HEAVINESS

## 2024-05-19 ASSESSMENT — PAIN DESCRIPTION - ORIENTATION
ORIENTATION: RIGHT

## 2024-05-19 ASSESSMENT — PAIN - FUNCTIONAL ASSESSMENT
PAIN_FUNCTIONAL_ASSESSMENT: 0-10

## 2024-05-19 ASSESSMENT — PAIN SCALES - GENERAL
PAINLEVEL_OUTOF10: 4
PAINLEVEL_OUTOF10: 8
PAINLEVEL_OUTOF10: 3
PAINLEVEL_OUTOF10: 5 - MODERATE PAIN

## 2024-05-19 NOTE — H&P
"History Of Present Illness  Earline Villagran is a 68 y.o. female with a PMH of HTN, HLD, T2DM (last HgA1c 8.8% 12/29/23), hypothyroidism, and right frozen shoulder syndrome. Pt presented to ED this morning for further evaluation of dizziness and chest pain that started this morning after she got out of the shower. Pt went to [place] and dizziness and chest heaviness started again. Chest pain/heaviness was present on the right of her chest and it was associated with dyspnea. Pt stated, \"I got dizzy and felt like I was about to pass out and my chest started feeling heavy. I felt like I could not catch my breath.\" Pt denies prior episodes of chest pain outside of today.  Chest pain did not radiate. EMS was called and per report of EMS her VS were normal but her BGL was >500. Pt recently went to [place] where she \"over indulged.\" Pt ran out of her Trulicity and has recently started taking it again.  She also endorses fatigue, polyuria, polyphagia and polydipsia over the past few days. Troponin negative on admit. No acute findings seen on CXR. EKG showed NSR with no ischemic changes. Labs obtained on admit notable for impaired renal function, hyponatremia, and hyperglycemia. Decision made to admit pt to medicine service under observation for further treatment and management of chest pain and hyperglycemia.     Past Medical History  Past Medical History:   Diagnosis Date    Concussion with loss of consciousness of 30 minutes or less, subsequent encounter 11/29/2021    Concussion with loss of consciousness of 30 minutes or less, subsequent encounter    Personal history of other diseases of the circulatory system     History of hypertension    Personal history of other diseases of the female genital tract 12/21/2020    History of vaginitis    Personal history of other diseases of the respiratory system 09/22/2022    History of sinusitis    Personal history of other endocrine, nutritional and metabolic disease     History " of diabetes mellitus    Personal history of other endocrine, nutritional and metabolic disease 08/25/2021    History of morbid obesity    Personal history of other infectious and parasitic diseases 04/28/2022    History of herpes zoster    Personal history of other specified conditions     History of seizure    Personal history of other specified conditions 12/12/2020    History of vertigo    Personal history of other specified conditions 01/27/2021    History of nasal congestion       Surgical History  Past Surgical History:   Procedure Laterality Date    OTHER SURGICAL HISTORY  01/02/2021    Subtotal thyroidectomy        Social History  She reports that she has quit smoking. Her smoking use included cigarettes. She has never been exposed to tobacco smoke. She has never used smokeless tobacco. She reports current alcohol use. She reports that she does not use drugs.    Family History  Family History   Problem Relation Name Age of Onset    Breast cancer Cousin      Ovarian cancer Cousin          Allergies  Shrimp, Ciprofloxacin, Clindamycin, Green tea, Penicillins, Sulfamethoxazole-trimethoprim, and Phenylephrine-guaifenesin    Review of Systems   Constitutional:  Negative for chills and fever.   Respiratory:  Positive for shortness of breath. Negative for cough.    Cardiovascular:  Positive for chest pain.   Gastrointestinal:  Negative for abdominal pain, diarrhea, nausea and vomiting.   Endocrine: Positive for polydipsia, polyphagia and polyuria.   Genitourinary:  Negative for difficulty urinating and dysuria.   Musculoskeletal:  Positive for myalgias.   Neurological:  Positive for dizziness and light-headedness. Negative for headaches.   Psychiatric/Behavioral:  Negative for confusion.         Physical Exam  Vitals reviewed.   Constitutional:       General: She is not in acute distress.     Appearance: She is not ill-appearing.   HENT:      Head: Normocephalic and atraumatic.      Nose: Nose normal.       "Mouth/Throat:      Mouth: Mucous membranes are dry.   Eyes:      Extraocular Movements: Extraocular movements intact.      Conjunctiva/sclera: Conjunctivae normal.   Cardiovascular:      Rate and Rhythm: Normal rate.      Pulses: Normal pulses.      Heart sounds: Normal heart sounds.   Pulmonary:      Effort: Pulmonary effort is normal.      Breath sounds: Normal breath sounds.   Abdominal:      General: Bowel sounds are normal.      Palpations: Abdomen is soft.   Musculoskeletal:      Cervical back: Normal range of motion.      Comments: ROM limited R shoulder d/t pain   Skin:     General: Skin is warm and dry.   Neurological:      Mental Status: She is alert and oriented to person, place, and time.          Last Recorded Vitals  Blood pressure 156/85, pulse 86, temperature 36.1 °C (96.9 °F), resp. rate 18, height 1.676 m (5' 6\"), weight 109 kg (240 lb), SpO2 99%.    Relevant Results  XR chest 2 views    Result Date: 5/19/2024  Interpreted By:  Ron Trinidad, STUDY: XR CHEST 2 VIEWS;  5/19/2024 11:10 am   INDICATION: Signs/Symptoms:chest pressure transient..   COMPARISON: 02/04/2024   ACCESSION NUMBER(S): [id number]   ORDERING CLINICIAN: WINTER LAZAR   FINDINGS: PA and lateral radiographs of the chest were provided.     CARDIOMEDIASTINAL SILHOUETTE: There is interval cardiomediastinal enlargement.   LUNGS: Lungs are clear.   ABDOMEN: No remarkable upper abdominal findings.   BONES: No acute osseous changes.       1.  Interval cardiomediastinal enlargement may be at least partially related to differences in technique. However, correlate with concern for pericardial effusion or cardiomegaly.       MACRO: None   Signed by: Ron Trinidad 5/19/2024 11:12 AM Dictation workstation:   [id number]      Results for orders placed or performed during the hospital encounter of 05/19/24 (from the past 24 hour(s))   CBC and Auto Differential   Result Value Ref Range    WBC 8.7 4.4 - 11.3 x10*3/uL    nRBC 0.0 0.0 - 0.0 /100 WBCs "    RBC 4.21 4.00 - 5.20 x10*6/uL    Hemoglobin 12.7 12.0 - 16.0 g/dL    Hematocrit 35.4 (L) 36.0 - 46.0 %    MCV 84 80 - 100 fL    MCH 30.2 26.0 - 34.0 pg    MCHC 35.9 32.0 - 36.0 g/dL    RDW 12.0 11.5 - 14.5 %    Platelets      Neutrophils % 79.0 40.0 - 80.0 %    Immature Granulocytes %, Automated 0.8 0.0 - 0.9 %    Lymphocytes % 13.0 13.0 - 44.0 %    Monocytes % 5.6 2.0 - 10.0 %    Eosinophils % 0.9 0.0 - 6.0 %    Basophils % 0.7 0.0 - 2.0 %    Neutrophils Absolute 6.85 1.20 - 7.70 x10*3/uL    Immature Granulocytes Absolute, Automated 0.07 0.00 - 0.70 x10*3/uL    Lymphocytes Absolute 1.13 (L) 1.20 - 4.80 x10*3/uL    Monocytes Absolute 0.49 0.10 - 1.00 x10*3/uL    Eosinophils Absolute 0.08 0.00 - 0.70 x10*3/uL    Basophils Absolute 0.06 0.00 - 0.10 x10*3/uL   Phosphorus   Result Value Ref Range    Phosphorus 3.5 2.5 - 4.9 mg/dL   Magnesium   Result Value Ref Range    Magnesium 1.61 1.60 - 2.40 mg/dL   Comprehensive metabolic panel   Result Value Ref Range    Glucose 480 (HH) 74 - 99 mg/dL    Sodium 130 (L) 136 - 145 mmol/L    Potassium 4.0 3.5 - 5.3 mmol/L    Chloride 95 (L) 98 - 107 mmol/L    Bicarbonate 22 21 - 32 mmol/L    Anion Gap 17 10 - 20 mmol/L    Urea Nitrogen 20 6 - 23 mg/dL    Creatinine 1.22 (H) 0.50 - 1.05 mg/dL    eGFR 48 (L) >60 mL/min/1.73m*2    Calcium 8.8 8.6 - 10.6 mg/dL    Albumin 3.8 3.4 - 5.0 g/dL    Alkaline Phosphatase 79 33 - 136 U/L    Total Protein 7.3 6.4 - 8.2 g/dL    AST 13 9 - 39 U/L    Bilirubin, Total 0.4 0.0 - 1.2 mg/dL    ALT 14 7 - 45 U/L   Troponin I, High Sensitivity, Initial   Result Value Ref Range    Troponin I, High Sensitivity 6 0 - 34 ng/L   Morphology   Result Value Ref Range    RBC Morphology See Below     Clumped Platelets Present    TSH with reflex to Free T4 if abnormal   Result Value Ref Range    Thyroid Stimulating Hormone 7.03 (H) 0.44 - 3.98 mIU/L   Sars-CoV-2 PCR   Result Value Ref Range    Coronavirus 2019, PCR Not Detected Not Detected   Influenza A, and B  PCR   Result Value Ref Range    Flu A Result Not Detected Not Detected    Flu B Result Not Detected Not Detected   POCT GLUCOSE   Result Value Ref Range    POCT Glucose 263 (H) 74 - 99 mg/dL      Lab Results   Component Value Date    HGBA1C 8.8 (H) 12/29/2023      ED Medication Administration from 05/19/2024 1003 to 05/19/2024 1722         Date/Time Order Dose Route Action Action by     05/19/2024 1035 EDT sodium chloride 0.9 % bolus 1,000 mL 1,000 mL intravenous New Bag GURJIT Cheema     05/19/2024 1135 EDT sodium chloride 0.9 % bolus 1,000 mL 0 mL intravenous Stopped GURJIT Cheema     05/19/2024 1258 EDT HYDROmorphone (Dilaudid) injection 0.5 mg 0.5 mg intravenous Given GURJIT Cheema     05/19/2024 1445 EDT insulin lispro (HumaLOG) injection 0-5 Units 3 Units subcutaneous Given GURJIT Cheema     05/19/2024 1501 EDT orphenadrine (Norflex) injection 60 mg 60 mg intravenous Given GURJIT Cheema           Scheduled medications  [START ON 5/20/2024] amLODIPine, 5 mg, oral, Daily  [START ON 5/20/2024] aspirin, 81 mg, oral, Daily  atorvastatin, 40 mg, oral, Nightly  [START ON 5/20/2024] fluticasone, 2 spray, Each Nostril, Daily  heparin (porcine), 5,000 Units, subcutaneous, q8h  [START ON 5/20/2024] hydroCHLOROthiazide, 25 mg, oral, Daily  insulin lispro, 0-10 Units, subcutaneous, TID  [START ON 5/20/2024] levothyroxine, 125 mcg, oral, Daily  perflutren lipid microspheres, 0.5-10 mL of dilution, intravenous, Once in imaging  [START ON 5/20/2024] SITagliptin phosphate, 100 mg, oral, Daily      Continuous medications     PRN medications  PRN medications: acetaminophen, cyclobenzaprine, dextrose, glucagon, melatonin, oxyCODONE, polyethylene glycol       Assessment/Plan   Principal Problem:    Chest pain    #chest pain  #dizziness  - Troponin 6 on admit  - EKG showed NSR with no ischemic or ST changes.  - Echo ordered for AM   - monitor on tele  - orthostatic BP/P once to r/o orthostatic hypotension d/t complaint of dizziness  - no prior Echo or  [facility] records in medical record  - last stress test done 12/29/15: No changing pattern is present between stress & rest to suggest ischemia. Normal stress myocardial perfusion imaging.    #hyperglycemia  #T2DM (last HgA1c 8.8% 12/29/23)  - serum glucose 480 on admit; POCT glucose 263 after administration of 3 units of Lispro  - HgA1c pending result  - accucheck ACHS  - hypoglycemia order set placed  - diabetic diet  - continue home dose of Januvia 100 mg PO every day    #hyponatremia  - Na 130 on admit, likely d/t hyperglycemia  - corrected Na 139    #HTN  - last /85  - continue to monitor BP  - continue home dose of hydrochlorothiazide 25 mg PO every day and Norvasc 5 mg PO every day    #HLD  - continue home dose of Lipitor 40 mg PO at bedtime    #hypothyroidism  - continue home dose of Synthroid 125 mcg PO every day    # R frozen shoulder syndrome  - pt receives PT outpatient for management last visit was on 5/8  - pt has appt scheduled with Ortho (Dr. Willie Reyes) on 5/30 for further evaluation  - Pain regimen: Tylenol 975 mg q6h PRN mod pain, Flexeril 10 mg PO TID PRN, and Oxycodone 5 mg q8h PRN severe pain (stop after 3 doses)      Dispo: admit to RNF. Plan per above.    I spent 60 minutes in the professional and overall care of this patient.      Leti Ortega, APRN-CNP

## 2024-05-19 NOTE — PROGRESS NOTES
Pharmacy Medication History Review    Earline Villagran is a 68 y.o. female admitted for Chest pain. Pharmacy reviewed the patient's [id number] medications and allergies for accuracy.    Medications ADDED:  Trulicity     The list below reflects the updated PTA list. Comments regarding how patient may be taking medications differently can be found in the Admit Orders Activity  Prior to Admission Medications   Prescriptions Last Dose Informant Patient Reported?   SITagliptin phosphate (Januvia) 100 mg tablet 5/19/2024 Self No   Sig: TAKE 1 TABLET BY MOUTH ONCE  DAILY   amLODIPine (Norvasc) 5 mg tablet 5/19/2024 Self No   Sig: TAKE 1 TABLET BY MOUTH DAILY   aspirin 81 mg EC tablet 5/19/2024 Self Yes   Sig: Take 1 tablet (81 mg) by mouth once daily.   atorvastatin (Lipitor) 40 mg tablet 5/18/2024 Self No   Sig: Take 1 tablet (40 mg) by mouth once daily.   dulaglutide (Trulicity) 3 mg/0.5 mL pen injector 5/17/2024 Self Yes   Sig: Inject 3 mg under the skin 1 (one) time per week.   fluticasone (Flonase) 50 mcg/actuation nasal spray 5/18/2024 Self No   Sig: Administer 2 sprays into each nostril once daily.   hydroCHLOROthiazide (HYDRODiuril) 25 mg tablet 5/19/2024 Self No   Sig: Take 1 tablet (25 mg) by mouth once daily.   levothyroxine (Synthroid, Levoxyl) 125 mcg tablet 5/19/2024 Self No   Sig: TAKE 1 TABLET BY MOUTH ONCE  DAILY   loratadine 10 mg capsule 5/19/2024 Self Yes   Sig: Take 10 mg by mouth once daily.   meclizine (Antivert) 25 mg tablet More than a month Self No   Sig: Take 1 tablet (25 mg) by mouth 3 times a day as needed for dizziness.   Patient not taking: Reported on 5/19/2024   omeprazole (PriLOSEC) 20 mg tablet,delayed release (DR/EC) EC tablet 5/18/2024 Self Yes   Sig: Take 1 tablet (20 mg) by mouth once daily.   semaglutide (Ozempic) 0.25 mg or 0.5 mg(2 mg/1.5 mL) pen injector Unknown Self No   Sig: Inject 0.5 mg under the skin 1 (one) time per week.      Facility-Administered Medications:  None        The list below reflects the updated allergy list. Please review each documented allergy for additional clarification and justification.  Allergies  Reviewed by Leti Ortega, APRN-CNP on 5/19/2024        Severity Reactions Comments    Shrimp High Anaphylaxis     Ciprofloxacin Medium Itching     Clindamycin Medium Diarrhea     Green Tea Medium Swelling     Penicillins Medium Syncope     Sulfamethoxazole-trimethoprim Medium Hives, Unknown     Phenylephrine-guaifenesin Low Palpitations             Patient accepts M2B at discharge.     Sources used to complete the med history include out patient fill history, OARRS, and patient interview along with patient message primary care Dr. Patricia Calderon 5/16/24.      Below are additional concerns with the patient's PTA list.  The patient was out of Trulicity for awhile and just started taking again this Friday 5/17/24 .     Juanito Saavedra [facility]  Transitions of Care Clinical Pharmacist  Please reach out via Epic Chat for questions, if no response call  [phone] or [phone]   [facility]

## 2024-05-19 NOTE — ED PROVIDER NOTES
HPI   No chief complaint on file.      Pt is an 68 y.o. with a history of diabetes, stage III kidney disease, hypertension, hypothyroidism presenting with dizziness and hyperglycemia.   the dizziness and chest pain that started this morning after she got out of the shower. Pt went to [employer] and dizziness and chest heaviness started again. With the chest pain, patient felt SOB, and presyncopal. Per EMS, her BG was >500. Pt recently went to [place] where she ate outside her regular diet. Additionally, she has been out of her Trulicity and has started taking it again on Friday.  She also endorses fatigue, polyuria, polyphagia and polydipsia over the past few days.                          No data recorded                Patient History   Past Medical History:   Diagnosis Date    Concussion with loss of consciousness of 30 minutes or less, subsequent encounter 11/29/2021    Concussion with loss of consciousness of 30 minutes or less, subsequent encounter    Personal history of other diseases of the circulatory system     History of hypertension    Personal history of other diseases of the female genital tract 12/21/2020    History of vaginitis    Personal history of other diseases of the respiratory system 09/22/2022    History of sinusitis    Personal history of other endocrine, nutritional and metabolic disease     History of diabetes mellitus    Personal history of other endocrine, nutritional and metabolic disease 08/25/2021    History of morbid obesity    Personal history of other infectious and parasitic diseases 04/28/2022    History of herpes zoster    Personal history of other specified conditions     History of seizure    Personal history of other specified conditions 12/12/2020    History of vertigo    Personal history of other specified conditions 01/27/2021    History of nasal congestion     Past Surgical History:   Procedure Laterality Date    OTHER SURGICAL HISTORY  01/02/2021    Subtotal thyroidectomy      Family History   Problem Relation Name Age of Onset    Breast cancer Cousin      Ovarian cancer Cousin       Social History     Tobacco Use    Smoking status: Former     Types: Cigarettes     Passive exposure: Never    Smokeless tobacco: Never   Vaping Use    Vaping status: Never Used   Substance Use Topics    Alcohol use: Yes    Drug use: Never       Physical Exam   ED Triage Vitals   Temp Pulse Resp BP   -- -- -- --      SpO2 Temp src Heart Rate Source Patient Position   -- -- -- --      BP Location FiO2 (%)     -- --       Physical Exam  Vitals and nursing note reviewed.   Constitutional:       General: She is not in acute distress.     Appearance: She is well-developed. She is obese.   HENT:      Head: Normocephalic and atraumatic.      Mouth/Throat:      Mouth: Mucous membranes are dry.   Eyes:      Extraocular Movements: Extraocular movements intact.      Conjunctiva/sclera: Conjunctivae normal.      Pupils: Pupils are equal, round, and reactive to light.   Cardiovascular:      Rate and Rhythm: Normal rate and regular rhythm.      Pulses: Normal pulses.      Heart sounds: No murmur heard.  Pulmonary:      Effort: Pulmonary effort is normal. No respiratory distress.      Breath sounds: Normal breath sounds.   Abdominal:      Palpations: Abdomen is soft.      Tenderness: There is no abdominal tenderness. There is no guarding.   Musculoskeletal:         General: No swelling or tenderness.      Cervical back: Neck supple.      Right lower leg: No edema.      Left lower leg: No edema.   Skin:     General: Skin is warm and dry.      Capillary Refill: Capillary refill takes less than 2 seconds.      Findings: No erythema.   Neurological:      General: No focal deficit present.      Mental Status: She is alert and oriented to person, place, and time.   Psychiatric:         Mood and Affect: Mood normal.         Behavior: Behavior normal.       ED Course & MDM       Medical Decision Making  Patient is a 68-year-old  "female who presents for evaluation of hyperglycemia lightheadedness chills.  She was found to have no acute EKG changes or elevated troponins however with elevated heart score do believe patient warrants cardiac workup.  Patient's blood glucose responded to fluids and insulin.  Patient typically uses Trulicity and is not insulin-dependent.  Patient is able to p.o. however she still is feeling lightheaded and not like her normal self.  No infectious concerns at this time.  In the setting of chest pain ongoing hyperglycemia/dizziness patient was admitted for further workup and restratification.        Please see ED course for updates on patient status, results, and disposition.     ED Course as of 05/26/24 1953   Sun May 19, 2024   1055 ECG 12 lead  EKG independently interpreted with sinus rhythm  Heart rate 90 bpm  TN interval 154 MS QRS 78 MS QTc 450 MS within normal limits.  No acute ST segment elevations or T wave inversions concerning for acute ischemia.  No arrhythmia or heart block. [SC]   1132 Chest x-ray dependently interpreted with no acute consolidation or obvious effusion though some haziness on chest x-ray.  Radiologist read with potential cardiomegaly.  Believe due to positioning. [SC]   1540 Resident supervision attestation.  I agree with the following, in brief this is a 68-year-old female past medical history of DM, presenting to the emergency department with near syncope, hyperglycemia, intermittent dizziness x 1 week described as \" blurry vision\" which is now resolved.  Endorses chest pressure that started today.  Vital signs are stable, patient is nontoxic, no focal neurologic deficits on exam.  Glucose was 480, creatinine 1.22, improved from baseline, initial troponin negative, EKG is nonischemic.  Was complaining of right arm spasming later with improvement after Norflex and Dilaudid, remains neurovascularly intact, compartments soft, pulses intact, does not have any radiation of pain from chest " to arm, I do have low suspicion for dissection.  In setting of her hyperglycemia, near syncope, anticipate admission. [SB]      ED Course User Index  [SB] Dhiraj Villarreal DO  [SC] Deidre Lira DO         Diagnoses as of 05/26/24 1953   Dizziness   Precordial pain   Hyperglycemia   Near syncope       Procedure  Procedures    Patient seen and discussed with Dr. Nyla Lira DO  PGY-2 Emergency Medicine        Deidre Lira DO  Resident  05/26/24 2039

## 2024-05-19 NOTE — ED TRIAGE NOTES
"Pt woke this morning with dizziness and chest pain after showering. Pt then went to [employer] where she felt the dizziness reoccur and \"heaviness\" in her chest. EMS was then called. EMS reports normal vitals but states her BGL was >500. Pt endorses a recent trip to [place] where she \"over indulged.\" Also states she has been out of her Trulicity and has very recently started taking it again. Endorses fatigue, polyuria, polyphagia and polydipsia over the past few days. Pt does not currently use insulin.     On arrival pt has IV placed with IVF bolus via EMS.  "

## 2024-05-20 ENCOUNTER — PHARMACY VISIT (OUTPATIENT)
Dept: PHARMACY | Facility: CLINIC | Age: 69
End: 2024-05-20
Payer: COMMERCIAL

## 2024-05-20 ENCOUNTER — APPOINTMENT (OUTPATIENT)
Dept: CARDIOLOGY | Facility: HOSPITAL | Age: 69
End: 2024-05-20
Payer: MEDICARE

## 2024-05-20 VITALS
OXYGEN SATURATION: 100 % | HEART RATE: 83 BPM | TEMPERATURE: 98.4 F | BODY MASS INDEX: 38.57 KG/M2 | RESPIRATION RATE: 18 BRPM | HEIGHT: 66 IN | WEIGHT: 240 LBS | SYSTOLIC BLOOD PRESSURE: 113 MMHG | DIASTOLIC BLOOD PRESSURE: 78 MMHG

## 2024-05-20 LAB
ALBUMIN SERPL BCP-MCNC: 4.2 G/DL (ref 3.4–5)
ANION GAP SERPL CALC-SCNC: 17 MMOL/L (ref 10–20)
AORTIC VALVE PEAK VELOCITY: 1.23 M/S
ATRIAL RATE: 77 BPM
ATRIAL RATE: 90 BPM
AV PEAK GRADIENT: 6.1 MMHG
AVA (PEAK VEL): 2.49 CM2
BUN SERPL-MCNC: 15 MG/DL (ref 6–23)
CALCIUM SERPL-MCNC: 9.6 MG/DL (ref 8.6–10.6)
CHLORIDE SERPL-SCNC: 96 MMOL/L (ref 98–107)
CO2 SERPL-SCNC: 27 MMOL/L (ref 21–32)
CREAT SERPL-MCNC: 1.12 MG/DL (ref 0.5–1.05)
EGFRCR SERPLBLD CKD-EPI 2021: 53 ML/MIN/1.73M*2
EJECTION FRACTION APICAL 4 CHAMBER: 54.9
ERYTHROCYTE [DISTWIDTH] IN BLOOD BY AUTOMATED COUNT: 12.1 % (ref 11.5–14.5)
GLUCOSE BLD MANUAL STRIP-MCNC: 196 MG/DL (ref 74–99)
GLUCOSE SERPL-MCNC: 170 MG/DL (ref 74–99)
HCT VFR BLD AUTO: 39.6 % (ref 36–46)
HGB BLD-MCNC: 13.3 G/DL (ref 12–16)
LEFT VENTRICULAR OUTFLOW TRACT DIAMETER: 2 CM
LV EJECTION FRACTION BIPLANE: 62 %
MAGNESIUM SERPL-MCNC: 1.83 MG/DL (ref 1.6–2.4)
MCH RBC QN AUTO: 29 PG (ref 26–34)
MCHC RBC AUTO-ENTMCNC: 33.6 G/DL (ref 32–36)
MCV RBC AUTO: 86 FL (ref 80–100)
MITRAL VALVE E/A RATIO: 0.64
MITRAL VALVE E/E' RATIO: 6.16
NRBC BLD-RTO: 0 /100 WBCS (ref 0–0)
P AXIS: 5 DEGREES
P AXIS: 61 DEGREES
P OFFSET: 190 MS
P OFFSET: 192 MS
P ONSET: 138 MS
P ONSET: 143 MS
PHOSPHATE SERPL-MCNC: 3.8 MG/DL (ref 2.5–4.9)
PLATELET # BLD AUTO: 249 X10*3/UL (ref 150–450)
POTASSIUM SERPL-SCNC: 4 MMOL/L (ref 3.5–5.3)
PR INTERVAL: 154 MS
PR INTERVAL: 168 MS
Q ONSET: 220 MS
Q ONSET: 222 MS
QRS COUNT: 13 BEATS
QRS COUNT: 15 BEATS
QRS DURATION: 78 MS
QRS DURATION: 78 MS
QT INTERVAL: 368 MS
QT INTERVAL: 390 MS
QTC CALCULATION(BAZETT): 441 MS
QTC CALCULATION(BAZETT): 450 MS
QTC FREDERICIA: 421 MS
QTC FREDERICIA: 423 MS
R AXIS: 62 DEGREES
R AXIS: 85 DEGREES
RBC # BLD AUTO: 4.59 X10*6/UL (ref 4–5.2)
RIGHT VENTRICLE FREE WALL PEAK S': 8.27 CM/S
SODIUM SERPL-SCNC: 136 MMOL/L (ref 136–145)
T AXIS: 37 DEGREES
T AXIS: 61 DEGREES
T OFFSET: 404 MS
T OFFSET: 417 MS
TRICUSPID ANNULAR PLANE SYSTOLIC EXCURSION: 2 CM
VENTRICULAR RATE: 77 BPM
VENTRICULAR RATE: 90 BPM
WBC # BLD AUTO: 9.5 X10*3/UL (ref 4.4–11.3)

## 2024-05-20 PROCEDURE — 93306 TTE W/DOPPLER COMPLETE: CPT | Performed by: INTERNAL MEDICINE

## 2024-05-20 PROCEDURE — 36415 COLL VENOUS BLD VENIPUNCTURE: CPT | Performed by: NURSE PRACTITIONER

## 2024-05-20 PROCEDURE — 82947 ASSAY GLUCOSE BLOOD QUANT: CPT | Mod: 59

## 2024-05-20 PROCEDURE — 96372 THER/PROPH/DIAG INJ SC/IM: CPT | Mod: 59 | Performed by: NURSE PRACTITIONER

## 2024-05-20 PROCEDURE — G0378 HOSPITAL OBSERVATION PER HR: HCPCS

## 2024-05-20 PROCEDURE — 2500000001 HC RX 250 WO HCPCS SELF ADMINISTERED DRUGS (ALT 637 FOR MEDICARE OP): Performed by: NURSE PRACTITIONER

## 2024-05-20 PROCEDURE — 2500000002 HC RX 250 W HCPCS SELF ADMINISTERED DRUGS (ALT 637 FOR MEDICARE OP, ALT 636 FOR OP/ED): Performed by: NURSE PRACTITIONER

## 2024-05-20 PROCEDURE — 93306 TTE W/DOPPLER COMPLETE: CPT

## 2024-05-20 PROCEDURE — 2500000006 HC RX 250 W HCPCS SELF ADMINISTERED DRUGS (ALT 637 FOR ALL PAYERS): Performed by: NURSE PRACTITIONER

## 2024-05-20 PROCEDURE — 80069 RENAL FUNCTION PANEL: CPT | Performed by: NURSE PRACTITIONER

## 2024-05-20 PROCEDURE — 2500000001 HC RX 250 WO HCPCS SELF ADMINISTERED DRUGS (ALT 637 FOR MEDICARE OP): Performed by: STUDENT IN AN ORGANIZED HEALTH CARE EDUCATION/TRAINING PROGRAM

## 2024-05-20 PROCEDURE — 2500000004 HC RX 250 GENERAL PHARMACY W/ HCPCS (ALT 636 FOR OP/ED): Performed by: NURSE PRACTITIONER

## 2024-05-20 PROCEDURE — 85027 COMPLETE CBC AUTOMATED: CPT | Performed by: NURSE PRACTITIONER

## 2024-05-20 PROCEDURE — RXMED WILLOW AMBULATORY MEDICATION CHARGE

## 2024-05-20 PROCEDURE — 83735 ASSAY OF MAGNESIUM: CPT | Performed by: NURSE PRACTITIONER

## 2024-05-20 RX ORDER — ACETAMINOPHEN 325 MG/1
975 TABLET ORAL EVERY 6 HOURS PRN
Qty: 30 TABLET | Refills: 0 | Status: SHIPPED | OUTPATIENT
Start: 2024-05-20

## 2024-05-20 RX ORDER — CARVEDILOL 3.12 MG/1
3.12 TABLET ORAL 2 TIMES DAILY
Status: DISCONTINUED | OUTPATIENT
Start: 2024-05-20 | End: 2024-05-20 | Stop reason: HOSPADM

## 2024-05-20 RX ORDER — CYCLOBENZAPRINE HCL 10 MG
10 TABLET ORAL 3 TIMES DAILY PRN
Qty: 30 TABLET | Refills: 0 | Status: SHIPPED | OUTPATIENT
Start: 2024-05-20 | End: 2024-06-19

## 2024-05-20 RX ORDER — CARVEDILOL 3.12 MG/1
3.12 TABLET ORAL
Qty: 60 TABLET | Refills: 0 | Status: SHIPPED | OUTPATIENT
Start: 2024-05-20 | End: 2024-06-19

## 2024-05-20 RX ADMIN — LEVOTHYROXINE SODIUM 125 MCG: 0.12 TABLET ORAL at 07:40

## 2024-05-20 RX ADMIN — AMLODIPINE BESYLATE 5 MG: 5 TABLET ORAL at 08:05

## 2024-05-20 RX ADMIN — CYCLOBENZAPRINE 10 MG: 10 TABLET, FILM COATED ORAL at 07:39

## 2024-05-20 RX ADMIN — SITAGLIPTIN 100 MG: 100 TABLET, FILM COATED ORAL at 10:03

## 2024-05-20 RX ADMIN — HEPARIN SODIUM 5000 UNITS: 5000 INJECTION INTRAVENOUS; SUBCUTANEOUS at 05:07

## 2024-05-20 RX ADMIN — OXYCODONE HYDROCHLORIDE 5 MG: 5 TABLET ORAL at 05:23

## 2024-05-20 RX ADMIN — HYDROCHLOROTHIAZIDE 25 MG: 25 TABLET ORAL at 08:05

## 2024-05-20 RX ADMIN — CARVEDILOL 3.12 MG: 3.12 TABLET, FILM COATED ORAL at 11:55

## 2024-05-20 RX ADMIN — ASPIRIN 81 MG: 81 TABLET, COATED ORAL at 08:05

## 2024-05-20 RX ADMIN — INSULIN LISPRO 2 UNITS: 100 INJECTION, SOLUTION INTRAVENOUS; SUBCUTANEOUS at 07:36

## 2024-05-20 RX ADMIN — FLUTICASONE PROPIONATE 2 SPRAY: 50 SPRAY, METERED NASAL at 08:06

## 2024-05-20 RX ADMIN — PERFLUTREN 2 ML OF DILUTION: 6.52 INJECTION, SUSPENSION INTRAVENOUS at 09:25

## 2024-05-20 ASSESSMENT — PAIN DESCRIPTION - LOCATION: LOCATION: ARM

## 2024-05-20 ASSESSMENT — PAIN - FUNCTIONAL ASSESSMENT: PAIN_FUNCTIONAL_ASSESSMENT: 0-10

## 2024-05-20 ASSESSMENT — PAIN DESCRIPTION - ORIENTATION: ORIENTATION: RIGHT

## 2024-05-20 ASSESSMENT — PAIN SCALES - GENERAL: PAINLEVEL_OUTOF10: 7

## 2024-05-20 NOTE — DISCHARGE SUMMARY
"                                                   INTERNAL MEDICINE DISCHARGE SUMMARY             Discharge Diagnosis   Chest pain    Issues Requiring Follow-Up   Diastolic CHF    Test Results Pending At Discharge     Pending Labs       Order Current Status    BLOOD GAS VENOUS FULL PANEL In process          Hospital Course     Earline Villagran a 68 y.o. female with a PMH of HTN, HLD, T2DM (last HgA1c 8.8% 12/29/23), hypothyroidism, and right frozen shoulder syndrome. Pt presented to ED this morning for further evaluation of dizziness and chest pain that started this morning after she got out of the shower. Pt went to [employer] and dizziness and chest heaviness started again. Chest pain/heaviness was present on the right of her chest and it was associated with dyspnea. Pt stated, \"I got dizzy and felt like I was about to pass out and my chest started feeling heavy. I felt like I could not catch my breath.\" Pt denies prior episodes of chest pain outside of today.  Chest pain did not radiate. EMS was called and per report of EMS her VS were normal but her BGL was >500. Pt recently went to [place] where she \"over indulged.\" Pt ran out of her Trulicity and has recently started taking it again.  She also endorses fatigue, polyuria, polyphagia and polydipsia over the past few days. Troponin negative on admit. No acute findings seen on CXR. EKG showed NSR with no ischemic changes. Labs obtained on admit notable for impaired renal function, hyponatremia, and hyperglycemia. Decision made to admit pt to medicine service under observation for further treatment and management of chest pain and hyperglycemia. Echocardiogram on 5/19/24 showed normal EF 60% and and impaired relaxation of LV diastolic filling. Patient was started on low dose carvedilol with recommendation to follow up with her PCP for possible future cardiology referral if symptoms persist      Pertinent Physical Exam At Time of Discharge     Physical " Exam  Constitutional:       General: She is not in acute distress.     Appearance: She is not ill-appearing.   HENT:      Head: Normocephalic and atraumatic.      Nose: Nose normal.      Mouth/Throat:      Mouth: Mucous membranes are dry.   Eyes:      Extraocular Movements: Extraocular movements intact.      Conjunctiva/sclera: Conjunctivae normal.   Cardiovascular:      Rate and Rhythm: Normal rate.      Pulses: Normal pulses.      Heart sounds: Normal heart sounds.   Pulmonary:      Effort: Pulmonary effort is normal.      Breath sounds: Normal breath sounds.   Abdominal:      General: Bowel sounds are normal.      Palpations: Abdomen is soft.   Musculoskeletal:      Cervical back: Normal range of motion.      Comments: ROM limited R shoulder d/t pain   Skin:     General: Skin is warm and dry.   Neurological:      Mental Status: She is alert and oriented to person, place, and time.     Home Medications        Medication List      START taking these medications     acetaminophen 325 mg tablet; Commonly known as: Tylenol; Take 3 tablets   (975 mg) by mouth every 6 hours if needed for moderate pain (4 - 6) or   mild pain (1 - 3).   carvedilol 3.125 mg tablet; Commonly known as: Coreg; Take 1 tablet   (3.125 mg) by mouth 2 times daily (morning and late afternoon).   cyclobenzaprine 10 mg tablet; Commonly known as: Flexeril; Take 1 tablet   (10 mg) by mouth 3 times a day as needed for muscle spasms.     CONTINUE taking these medications     amLODIPine 5 mg tablet; Commonly known as: Norvasc; TAKE 1 TABLET BY   MOUTH DAILY   aspirin 81 mg EC tablet   atorvastatin 40 mg tablet; Commonly known as: Lipitor; Take 1 tablet (40   mg) by mouth once daily.   fluticasone 50 mcg/actuation nasal spray; Commonly known as: Flonase;   Administer 2 sprays into each nostril once daily.   hydroCHLOROthiazide 25 mg tablet; Commonly known as: HYDRODiuril; Take 1   tablet (25 mg) by mouth once daily.   Januvia 100 mg tablet; Generic drug:  SITagliptin phosphate; TAKE 1   TABLET BY MOUTH ONCE  DAILY   levothyroxine 125 mcg tablet; Commonly known as: Synthroid, Levoxyl;   TAKE 1 TABLET BY MOUTH ONCE  DAILY   loratadine 10 mg capsule   omeprazole 20 mg tablet,delayed release (DR/EC) EC tablet; Commonly   known as: PriLOSEC   Ozempic 0.25 mg or 0.5 mg(2 mg/1.5 mL) pen injector; Generic drug:   semaglutide; Inject 0.5 mg under the skin 1 (one) time per week.   Trulicity 3 mg/0.5 mL pen injector; Generic drug: dulaglutide     STOP taking these medications     meclizine 25 mg tablet; Commonly known as: Antivert     Outpatient Follow-Up     Future Appointments   Date Time Provider Department [place]   5/30/2024  8:10 AM Willie Reyes MD [id number] [facility]   6/3/2024  8:30 AM Patricia Calderon MD [id number] [facility]       Matt Hdz MD

## 2024-05-22 ENCOUNTER — PATIENT OUTREACH (OUTPATIENT)
Dept: PRIMARY CARE | Facility: CLINIC | Age: 69
End: 2024-05-22
Payer: MEDICARE

## 2024-05-22 DIAGNOSIS — Z09 HOSPITAL DISCHARGE FOLLOW-UP: ICD-10-CM

## 2024-05-22 DIAGNOSIS — R73.9 HYPERGLYCEMIA: ICD-10-CM

## 2024-05-22 NOTE — PROGRESS NOTES
TCM call completed- Detailed message left providing call back phone number- no return call as of this time.    Patient is scheduled within 14 days of discharge on (5/28/24) for hospital follow up, however was unable to reach patient during two business days after discharge despite at least two attempts made.    Moderately complex & follow-up within 14 days- bill [id number] for hospital follow up.   Highly complex and follow-up visit within 7 days-can bill [id number] for hospital follow up    * Virtual follow up needs modifier added (95 or GT)  AWV AND TCM CAN BE BILLED TOGETHER WITH 25 MODIFIER     Discharge Facility: [facility]  Discharge Diagnosis: Dizziness, hyperglycemia  Admission Date: 5/19/24  Discharge Date: 5/20/24  Hospital Encounter and Summary: Linked

## 2024-05-28 ENCOUNTER — OFFICE VISIT (OUTPATIENT)
Dept: PRIMARY CARE | Facility: CLINIC | Age: 69
End: 2024-05-28
Payer: MEDICARE

## 2024-05-28 ENCOUNTER — HOSPITAL ENCOUNTER (OUTPATIENT)
Dept: RADIOLOGY | Facility: CLINIC | Age: 69
Discharge: HOME | End: 2024-05-28
Payer: MEDICARE

## 2024-05-28 VITALS
OXYGEN SATURATION: 97 % | HEART RATE: 105 BPM | WEIGHT: 233.8 LBS | HEIGHT: 66 IN | TEMPERATURE: 98.2 F | SYSTOLIC BLOOD PRESSURE: 118 MMHG | DIASTOLIC BLOOD PRESSURE: 70 MMHG | BODY MASS INDEX: 37.57 KG/M2

## 2024-05-28 DIAGNOSIS — M79.644 PAIN OF RIGHT THUMB: ICD-10-CM

## 2024-05-28 DIAGNOSIS — I10 PRIMARY HYPERTENSION: ICD-10-CM

## 2024-05-28 DIAGNOSIS — I70.203 ATHEROSCLEROSIS OF NATIVE ARTERY OF BOTH LOWER EXTREMITIES, WITH UNSPECIFIED PRESENCE OF CLINICAL MANIFESTATION (CMS-HCC): ICD-10-CM

## 2024-05-28 DIAGNOSIS — E66.01 CLASS 2 SEVERE OBESITY DUE TO EXCESS CALORIES WITH SERIOUS COMORBIDITY AND BODY MASS INDEX (BMI) OF 37.0 TO 37.9 IN ADULT (MULTI): ICD-10-CM

## 2024-05-28 DIAGNOSIS — N18.31 STAGE 3A CHRONIC KIDNEY DISEASE (MULTI): ICD-10-CM

## 2024-05-28 DIAGNOSIS — E66.01 MORBID OBESITY (MULTI): ICD-10-CM

## 2024-05-28 DIAGNOSIS — N18.30 CKD STAGE 3 DUE TO TYPE 2 DIABETES MELLITUS (MULTI): Primary | ICD-10-CM

## 2024-05-28 DIAGNOSIS — E11.22 CKD STAGE 3 DUE TO TYPE 2 DIABETES MELLITUS (MULTI): Primary | ICD-10-CM

## 2024-05-28 PROCEDURE — 1123F ACP DISCUSS/DSCN MKR DOCD: CPT | Performed by: FAMILY MEDICINE

## 2024-05-28 PROCEDURE — 73140 X-RAY EXAM OF FINGER(S): CPT | Mod: RT

## 2024-05-28 PROCEDURE — 3074F SYST BP LT 130 MM HG: CPT | Performed by: FAMILY MEDICINE

## 2024-05-28 PROCEDURE — 73140 X-RAY EXAM OF FINGER(S): CPT | Mod: RIGHT SIDE | Performed by: RADIOLOGY

## 2024-05-28 PROCEDURE — 1159F MED LIST DOCD IN RCRD: CPT | Performed by: FAMILY MEDICINE

## 2024-05-28 PROCEDURE — 3078F DIAST BP <80 MM HG: CPT | Performed by: FAMILY MEDICINE

## 2024-05-28 PROCEDURE — 1158F ADVNC CARE PLAN TLK DOCD: CPT | Performed by: FAMILY MEDICINE

## 2024-05-28 PROCEDURE — 3046F HEMOGLOBIN A1C LEVEL >9.0%: CPT | Performed by: FAMILY MEDICINE

## 2024-05-28 PROCEDURE — 99496 TRANSJ CARE MGMT HIGH F2F 7D: CPT | Performed by: FAMILY MEDICINE

## 2024-05-28 PROCEDURE — 3008F BODY MASS INDEX DOCD: CPT | Performed by: FAMILY MEDICINE

## 2024-05-28 PROCEDURE — 1160F RVW MEDS BY RX/DR IN RCRD: CPT | Performed by: FAMILY MEDICINE

## 2024-05-28 RX ORDER — BLOOD SUGAR DIAGNOSTIC
STRIP MISCELLANEOUS
Qty: 200 STRIP | Refills: 2 | Status: SHIPPED | OUTPATIENT
Start: 2024-05-28

## 2024-05-28 ASSESSMENT — ANXIETY QUESTIONNAIRES
7. FEELING AFRAID AS IF SOMETHING AWFUL MIGHT HAPPEN: NOT AT ALL
GAD7 TOTAL SCORE: 0
4. TROUBLE RELAXING: NOT AT ALL
1. FEELING NERVOUS, ANXIOUS, OR ON EDGE: NOT AT ALL
5. BEING SO RESTLESS THAT IT IS HARD TO SIT STILL: NOT AT ALL
3. WORRYING TOO MUCH ABOUT DIFFERENT THINGS: NOT AT ALL
2. NOT BEING ABLE TO STOP OR CONTROL WORRYING: NOT AT ALL
6. BECOMING EASILY ANNOYED OR IRRITABLE: NOT AT ALL

## 2024-05-28 ASSESSMENT — ENCOUNTER SYMPTOMS
OCCASIONAL FEELINGS OF UNSTEADINESS: 0
LOSS OF SENSATION IN FEET: 0
DEPRESSION: 0

## 2024-05-28 NOTE — PROGRESS NOTES
"  Subjective   Patient ID: Earline Villagran is a 68 y.o. female who presents for Follow-up (ED ).  Patient here  for follow up of overnight hospital stay.  She was admitted 5/19/24-5/20/24,  for dizziness  and chest  pain.  Hospital course copied from discharge summary:  \"Earline Villagran a 68 y.o. female with a PMH of HTN, HLD, T2DM (last HgA1c 8.8% 12/29/23), hypothyroidism, and right frozen shoulder syndrome. Pt presented to ED this morning for further evaluation of dizziness and chest pain that started this morning after she got out of the shower. Pt went to [employer] and dizziness and chest heaviness started again. Chest pain/heaviness was present on the right of her chest and it was associated with dyspnea. Pt stated, \"I got dizzy and felt like I was about to pass out and my chest started feeling heavy. I felt like I could not catch my breath.\" Pt denies prior episodes of chest pain outside of today.  Chest pain did not radiate. EMS was called and per report of EMS her VS were normal but her BGL was >500. Pt recently went to [place] where she \"over indulged.\" Pt ran out of her Trulicity and has recently started taking it again.  She also endorses fatigue, polyuria, polyphagia and polydipsia over the past few days. Troponin negative on admit. No acute findings seen on CXR. EKG showed NSR with no ischemic changes. Labs obtained on admit notable for impaired renal function, hyponatremia, and hyperglycemia. Decision made to admit pt to medicine service under observation for further treatment and management of chest pain and hyperglycemia. Echocardiogram on 5/19/24 showed normal EF 60% and and impaired relaxation of LV diastolic filling. Patient was started on low dose carvedilol with recommendation to follow up with her PCP for possible future cardiology referral if symptoms persist\"    She was started on the carvedilol.  She was also given cyclobenzaprine for her right arm pain, which has helped  a lot.  No " "side effects to carvedilol.  Has not needed to continue cyclobenzaprine.    She saw an endocrinologist at [facility], Dr. White.    Januvia was stopped.  Still on Trulicity, 3 mg now.  Taking some of her glipizide intermittently, when she thinks her glucose is up, but is unable to check it.  No strips to test glucose  Going to see nutritionist, and had been referred to diabetic education.    Had also been having heaviness in chest when went to the ED, it lasted about 2 hours.  It  hasn't recurred.  No further dizzy spells.  No SOB or palpitations.    Is constipated, using  metamucil.    Second job is over for the summer.    Right thumb pain and popping for months.        Review of Systems    Objective   /70   Pulse 105   Temp 36.8 °C (98.2 °F)   Ht 1.676 m (5' 6\")   Wt 106 kg (233 lb 12.8 oz)   SpO2 97%   BMI 37.74 kg/m²     Physical Exam  Vitals reviewed.   Constitutional:       Appearance: Normal appearance.   Cardiovascular:      Rate and Rhythm: Normal rate and regular rhythm.      Heart sounds: No murmur heard.  Pulmonary:      Effort: Pulmonary effort is normal.      Breath sounds: Normal breath sounds.   Musculoskeletal:      Right lower leg: No edema.      Left lower leg: No edema.   Neurological:      Mental Status: She is alert.           Assessment/Plan   Problem List Items Addressed This Visit       CKD stage 3 due to type 2 diabetes mellitus (Multi) - Primary    Relevant Medications    blood sugar diagnostic (OneTouch Ultra Test) strip    Atherosclerosis of native artery of both lower extremities, with unspecified presence of clinical manifestation (CMS-[facility])    Class 2 severe obesity due to excess calories with serious comorbidity and body mass index (BMI) of 37.0 to 37.9 in adult (Multi)    HTN (hypertension)    Stage 3a chronic kidney disease (Multi)     Other Visit Diagnoses       Pain of right thumb        Relevant Orders    XR thumb right MIN 2 views (Completed)               "

## 2024-05-30 ENCOUNTER — DOCUMENTATION (OUTPATIENT)
Dept: PRIMARY CARE | Facility: CLINIC | Age: 69
End: 2024-05-30
Payer: MEDICARE

## 2024-05-30 ENCOUNTER — OFFICE VISIT (OUTPATIENT)
Dept: ORTHOPEDIC SURGERY | Facility: HOSPITAL | Age: 69
End: 2024-05-30
Payer: MEDICARE

## 2024-05-30 VITALS — BODY MASS INDEX: 37.45 KG/M2 | WEIGHT: 233 LBS | HEIGHT: 66 IN

## 2024-05-30 DIAGNOSIS — M65.311 TRIGGER FINGER OF RIGHT THUMB: ICD-10-CM

## 2024-05-30 PROBLEM — N18.31 STAGE 3A CHRONIC KIDNEY DISEASE (MULTI): Status: ACTIVE | Noted: 2024-05-30

## 2024-05-30 PROBLEM — E66.812 CLASS 2 SEVERE OBESITY DUE TO EXCESS CALORIES WITH SERIOUS COMORBIDITY AND BODY MASS INDEX (BMI) OF 37.0 TO 37.9 IN ADULT: Status: ACTIVE | Noted: 2023-12-12

## 2024-05-30 PROCEDURE — 1123F ACP DISCUSS/DSCN MKR DOCD: CPT | Performed by: STUDENT IN AN ORGANIZED HEALTH CARE EDUCATION/TRAINING PROGRAM

## 2024-05-30 PROCEDURE — 1160F RVW MEDS BY RX/DR IN RCRD: CPT | Performed by: STUDENT IN AN ORGANIZED HEALTH CARE EDUCATION/TRAINING PROGRAM

## 2024-05-30 PROCEDURE — 1159F MED LIST DOCD IN RCRD: CPT | Performed by: STUDENT IN AN ORGANIZED HEALTH CARE EDUCATION/TRAINING PROGRAM

## 2024-05-30 PROCEDURE — 3046F HEMOGLOBIN A1C LEVEL >9.0%: CPT | Performed by: STUDENT IN AN ORGANIZED HEALTH CARE EDUCATION/TRAINING PROGRAM

## 2024-05-30 PROCEDURE — 99214 OFFICE O/P EST MOD 30 MIN: CPT | Performed by: STUDENT IN AN ORGANIZED HEALTH CARE EDUCATION/TRAINING PROGRAM

## 2024-05-30 PROCEDURE — 1036F TOBACCO NON-USER: CPT | Performed by: STUDENT IN AN ORGANIZED HEALTH CARE EDUCATION/TRAINING PROGRAM

## 2024-05-30 PROCEDURE — 3008F BODY MASS INDEX DOCD: CPT | Performed by: STUDENT IN AN ORGANIZED HEALTH CARE EDUCATION/TRAINING PROGRAM

## 2024-05-30 ASSESSMENT — PAIN SCALES - GENERAL: PAINLEVEL_OUTOF10: 0 - NO PAIN

## 2024-05-30 ASSESSMENT — PAIN - FUNCTIONAL ASSESSMENT: PAIN_FUNCTIONAL_ASSESSMENT: 0-10

## 2024-05-30 NOTE — PATIENT INSTRUCTIONS
Follow up after overnight hospitalization for dizziness and chest pressure.  Symptoms possibly result of uncontrolled glucose.  Doing better now.  Seeing endocrinologist.  Currently taking  3 mg  Trulicity alone.  Will be  seeing nutritionist.  Need  to get  glucose monitor to check levels.  Would  not  use glipizide intermittently without knowing what   the glucose is.    BP is controlled.  Carvedilol had been  started  to help the  heart.  No side effects  Follow  up 3 months.    Chest pressure has not recurred.  Follow up if it does, as additional  testing may be needed.    Check   thumb  X-Ray  to evaluate pain.  There may be arthritis in the joint.

## 2024-05-30 NOTE — PROGRESS NOTES
PRIMARY CARE PHYSICIAN: Patricia Calderon MD  REFERRING PROVIDER: No referring provider defined for this encounter.     CONSULT ORTHOPAEDIC: Shoulder Evaluation    ASSESSMENT & PLAN    Impression: 68 y.o. female with right shoulder impingement and rotator cuff tendinosis with mild degenerative changes with a new complaint of a trigger thumb.    Plan:   I explained to the patient the nature of their diagnosis.  I reviewed their imaging studies with them.    Based on the history, physical exam and imaging studies above, the patient's presentation is consistent with the above diagnosis.  I had a long discussion with the patient regarding their presentation and the treatment options.  We discussed initial nonoperative versus operative management options as well as potential further diagnostic imaging.  Regarding the trigger thumb, I provided her with a referral to my hand surgery colleague Dr. Troncoso for further evaluation and management going forward.    Follow-Up: Patient will follow-up with me as needed    At the end of the visit, all questions were answered in full. The patient is in agreement with the plan and recommendations. They will call the office with any questions/concerns.    Note dictated with [employer] software. Completed without full typed error editing and sent to avoid delay.       SUBJECTIVE  CHIEF COMPLAINT:   Chief Complaint   Patient presents with    Right Shoulder - Follow-up        HPI: Earline Villagran is a 68 y.o. patient. Earline Villagran returns to clinic with right shoulder pain. Since her last visit, her shoulder pain has decreased however, she is now reporting right hand pain. She was seen in the ER for this pain and was given muscle relaxers, which helped relieve hand pain. Earline had right hand XR done with her PCP this week. Earline denies any direct trauma to the shoulder or hand since her last visit.  She notes that the shoulder pain has resolved but her right  thumb continues to trigger with associated pain.  They deny any associated neck pain.  No numbness, tingling, or paresthesias.    REVIEW OF SYSTEMS  Constitutional: See HPI for pain assessment, No significant weight loss, recent trauma  Cardiovascular: No chest pain, shortness of breath  Respiratory: No difficulty breathing, cough  Gastrointestinal: No nausea, vomiting, diarrhea, constipation  Musculoskeletal: Noted in HPI, positive for pain, restricted motion, stiffness  Integumentary: No rashes, easy bruising, redness   Neurological: no numbness or tingling in extremities, no gait disturbances   Psychiatric: No mood changes, memory changes, social issues  Heme/Lymph: no excessive swelling, easy bruising, excessive bleeding  ENT: No hearing changes  Eyes: No vision changes    Past Medical History:   Diagnosis Date    Concussion with loss of consciousness of 30 minutes or less, subsequent encounter 11/29/2021    Concussion with loss of consciousness of 30 minutes or less, subsequent encounter    Personal history of other diseases of the circulatory system     History of hypertension    Personal history of other diseases of the female genital tract 12/21/2020    History of vaginitis    Personal history of other diseases of the respiratory system 09/22/2022    History of sinusitis    Personal history of other endocrine, nutritional and metabolic disease     History of diabetes mellitus    Personal history of other endocrine, nutritional and metabolic disease 08/25/2021    History of morbid obesity    Personal history of other infectious and parasitic diseases 04/28/2022    History of herpes zoster    Personal history of other specified conditions     History of seizure    Personal history of other specified conditions 12/12/2020    History of vertigo    Personal history of other specified conditions 01/27/2021    History of nasal congestion        Allergies   Allergen Reactions    Shrimp Anaphylaxis    Ciprofloxacin  Itching    Clindamycin Diarrhea    Green Tea Swelling    Penicillins Syncope    Sulfamethoxazole-Trimethoprim Hives and Unknown    Phenylephrine-Guaifenesin Palpitations        Past Surgical History:   Procedure Laterality Date    OTHER SURGICAL HISTORY  01/02/2021    Subtotal thyroidectomy        Family History   Problem Relation Name Age of Onset    Breast cancer Cousin      Ovarian cancer Cousin          Social History     Socioeconomic History    Marital status:      Spouse name: Not on file    Number of children: Not on file    Years of education: Not on file    Highest education level: Not on file   Occupational History    Not on file   Tobacco Use    Smoking status: Former     Types: Cigarettes     Passive exposure: Never    Smokeless tobacco: Never   Vaping Use    Vaping status: Never Used   Substance and Sexual Activity    Alcohol use: Yes    Drug use: Never    Sexual activity: Defer   Other Topics Concern    Not on file   Social History Narrative    Not on file     Social Determinants of Health     Financial Resource Strain: Not on file   Food Insecurity: Not on file   Transportation Needs: Not on file   Physical Activity: Not on file   Stress: Not on file   Social Connections: Not on file   Intimate Partner Violence: Not on file   Housing Stability: Not on file        CURRENT MEDICATIONS:   Current Outpatient Medications   Medication Sig Dispense Refill    acetaminophen (Tylenol) 325 mg tablet Take 3 tablets (975 mg) by mouth every 6 hours if needed for moderate pain (4 - 6) or mild pain (1 - 3). 30 tablet 0    amLODIPine (Norvasc) 5 mg tablet TAKE 1 TABLET BY MOUTH DAILY 90 tablet 1    aspirin 81 mg EC tablet Take 1 tablet (81 mg) by mouth once daily.      atorvastatin (Lipitor) 40 mg tablet Take 1 tablet (40 mg) by mouth once daily. 90 tablet 2    blood sugar diagnostic (OneTouch Ultra Test) strip Test glucose 2-3 times a day. 200 strip 2    carvedilol (Coreg) 3.125 mg tablet Take 1 tablet (3.125  "mg) by mouth 2 times daily (morning and late afternoon). 60 tablet 0    cyclobenzaprine (Flexeril) 10 mg tablet Take 1 tablet (10 mg) by mouth 3 times a day as needed for muscle spasms. 30 tablet 0    dulaglutide (Trulicity) 3 mg/0.5 mL pen injector Inject 3 mg under the skin 1 (one) time per week.      fluticasone (Flonase) 50 mcg/actuation nasal spray Administer 2 sprays into each nostril once daily. 16 g 1    hydroCHLOROthiazide (HYDRODiuril) 25 mg tablet Take 1 tablet (25 mg) by mouth once daily. 90 tablet 1    levothyroxine (Synthroid, Levoxyl) 125 mcg tablet TAKE 1 TABLET BY MOUTH ONCE  DAILY 90 tablet 0    loratadine 10 mg capsule Take 10 mg by mouth once daily.      omeprazole (PriLOSEC) 20 mg tablet,delayed release (DR/EC) EC tablet Take 1 tablet (20 mg) by mouth once daily.       No current facility-administered medications for this visit.        OBJECTIVE    PHYSICAL EXAM      5/19/2024    10:36 AM 5/19/2024    12:57 PM 5/19/2024     8:27 PM 5/19/2024    11:32 PM 5/20/2024     5:09 AM 5/20/2024     9:00 AM 5/28/2024    10:37 AM   Vitals   Systolic 125 156 128 139 141 113 118   Diastolic 74 85 81 84 84 78 70   Heart Rate 88 86 88 83 78 83 105   Temp 36.1 °C (96.9 °F)    36.6 °C (97.9 °F) 36.9 °C (98.4 °F) 36.8 °C (98.2 °F)   Resp 18 18 18 18 18 18    Height (in) 1.676 m (5' 6\")      1.676 m (5' 6\")   Weight (lb) 240      233.8   BMI 38.74 kg/m2      37.74 kg/m2   BSA (m2) 2.25 m2      2.22 m2   Visit Report       Report      There is no height or weight on file to calculate BMI.    GENERAL: A/Ox3, NAD. Appears healthy, well nourished  PSYCHIATRIC: Mood stable, appropriate memory recall  EYES: EOM intact, no scleral icterus  CARDIOVASCULAR: Palpable peripheral pulses  LUNGS: Breathing non-labored on room air  SKIN: no erythema, rashes, or ecchymosis     MUSCULOSKELETAL:  Laterality: right Shoulder Exam  - Negative Spurlings, full painless neck ROM  - Skin intact  - No erythema or warmth  - No ecchymosis or " soft tissue swelling  - Shoulder ROM: Forward flexion 130, ER 35, IR upper lumbar  - Strength:      Jobes 4+/5     ER 5-/5     Belly press and bearhug 5-/5  - Palpation: Positive tenderness biceps groove and posterolateral acromion  - Griffiths and Neers positive  - Speeds and O'Briens positive  -Positive right trigger thumb  -No snuffbox tenderness  -Able to make a loose fist with 4+/5  strength    NEUROVASCULAR:  - Neurovascular Status: sensation intact to light touch distally, upper extremity motor grossly intact  - Capillary refill brisk at extremities, Bilateral peripheral pulses 2+    Imaging: Multiple views of the affected right shoulder(s) demonstrate: Minimal degenerative changes, no acute osseous abnormality, no fracture.   X-rays were personally reviewed and interpreted by me.  Radiology reports were reviewed by me as well, if readily available at the time.    X-ray views of the right thumb reviewed by me demonstrate mild degenerative changes of the first CMC joint; no fracture.            Willie Reyes MD  Attending Surgeon    Sports Medicine Orthopaedic Surgery  [facility] [place]  [facility] [employer] [facility] [employer]

## 2024-05-30 NOTE — LETTER
May 30, 2024     Patient: Earline Villagran   YOB: 1955   Date of Visit: 5/30/2024       To Whom it May Concern:    Earline Villagran was seen in my clinic on 5/30/2024. Any questions or concerns please call us at [phone]          Sincerely,          Willie Reyes MD        CC: No Recipients

## 2024-06-03 ENCOUNTER — APPOINTMENT (OUTPATIENT)
Dept: PRIMARY CARE | Facility: CLINIC | Age: 69
End: 2024-06-03
Payer: MEDICARE

## 2024-06-13 DIAGNOSIS — I50.30 DIASTOLIC CONGESTIVE HEART FAILURE, UNSPECIFIED HF CHRONICITY (MULTI): ICD-10-CM

## 2024-06-14 RX ORDER — CARVEDILOL 3.12 MG/1
3.12 TABLET ORAL
Qty: 60 TABLET | Refills: 2 | Status: SHIPPED | OUTPATIENT
Start: 2024-06-14 | End: 2024-09-12

## 2024-06-23 DIAGNOSIS — R09.81 SINUS CONGESTION: ICD-10-CM

## 2024-06-23 DIAGNOSIS — E11.22 CKD STAGE 3 DUE TO TYPE 2 DIABETES MELLITUS (MULTI): Primary | ICD-10-CM

## 2024-06-23 DIAGNOSIS — N18.30 CKD STAGE 3 DUE TO TYPE 2 DIABETES MELLITUS (MULTI): Primary | ICD-10-CM

## 2024-06-23 DIAGNOSIS — I10 BENIGN ESSENTIAL HYPERTENSION: ICD-10-CM

## 2024-06-23 RX ORDER — HYDROCHLOROTHIAZIDE 25 MG/1
25 TABLET ORAL DAILY
Qty: 90 TABLET | Refills: 1 | Status: SHIPPED | OUTPATIENT
Start: 2024-06-23

## 2024-06-23 RX ORDER — DULAGLUTIDE 3 MG/.5ML
3 INJECTION, SOLUTION SUBCUTANEOUS
Qty: 6 ML | Refills: 0 | Status: SHIPPED | OUTPATIENT
Start: 2024-06-23

## 2024-06-23 RX ORDER — FLUTICASONE PROPIONATE 50 MCG
2 SPRAY, SUSPENSION (ML) NASAL DAILY
Qty: 16 G | Refills: 1 | Status: SHIPPED | OUTPATIENT
Start: 2024-06-23

## 2024-07-18 ENCOUNTER — APPOINTMENT (OUTPATIENT)
Dept: ORTHOPEDIC SURGERY | Facility: CLINIC | Age: 69
End: 2024-07-18
Payer: MEDICARE

## 2024-07-18 ENCOUNTER — HOSPITAL ENCOUNTER (OUTPATIENT)
Dept: RADIOLOGY | Facility: CLINIC | Age: 69
Discharge: HOME | End: 2024-07-18
Payer: MEDICARE

## 2024-07-18 ENCOUNTER — OFFICE VISIT (OUTPATIENT)
Dept: ORTHOPEDIC SURGERY | Facility: CLINIC | Age: 69
End: 2024-07-18
Payer: MEDICARE

## 2024-07-18 VITALS — HEIGHT: 66 IN | WEIGHT: 233 LBS | BODY MASS INDEX: 37.45 KG/M2

## 2024-07-18 DIAGNOSIS — M25.562 ACUTE BILATERAL KNEE PAIN: ICD-10-CM

## 2024-07-18 DIAGNOSIS — M25.561 ACUTE BILATERAL KNEE PAIN: ICD-10-CM

## 2024-07-18 DIAGNOSIS — M65.311 TRIGGER FINGER OF RIGHT THUMB: ICD-10-CM

## 2024-07-18 PROCEDURE — 99213 OFFICE O/P EST LOW 20 MIN: CPT | Performed by: ORTHOPAEDIC SURGERY

## 2024-07-18 ASSESSMENT — PAIN DESCRIPTION - DESCRIPTORS: DESCRIPTORS: ACHING;SORE

## 2024-07-18 ASSESSMENT — PAIN SCALES - GENERAL: PAINLEVEL_OUTOF10: 5 - MODERATE PAIN

## 2024-07-18 ASSESSMENT — PAIN - FUNCTIONAL ASSESSMENT: PAIN_FUNCTIONAL_ASSESSMENT: 0-10

## 2024-07-18 NOTE — LETTER
July 18, 2024     Patricia Calderon MD  [address]  [address]  [facility] [address]    Patient: Earline Villagran   YOB: 1955   Date of Visit: 7/18/2024       Dear Dr. Patricia Calderon MD:    Thank you for referring Earline Villagran to me for evaluation. Below are my notes for this consultation.  If you have questions, please do not hesitate to call me. I look forward to following your patient along with you.       Sincerely,     Micheal Troncoso MD      CC: Willie Reyes MD  ______________________________________________________________________________________    Patient is a 68-year-old right-hand-dominant female with diabetes who presents today for evaluation of pain, swelling, stiffness and clicking type symptoms with her right thumb.  Symptoms have been present for several weeks.  She has not had any prior workup or treatment.  Her diabetes is relatively poorly controlled.  Hemoglobin A1c level from May of this year was 10.6%.    Patient's self reported past medical history, medications, allergies, surgical history, family and social history as well as a 10 point review of systems has been documented in the new patient intake form and scanned into the patient's electronic medical record. Pertinent findings are documented in the HPI.    Physical Examination Findings:  Constitutional: Appears well-developed and well-nourished.  Head: Normocephalic and atraumatic.  Eyes: Pupils are equal and round.  Cardiovascular: Intact distal pulses.   Respiratory: Effort normal. No respiratory distress.  Neurologic: Alert and oriented to person, place, and time.  Skin: Skin is warm and dry.  Hematologic / Lymphatic: No lymphedema, lymphangitis.  Psychiatric: normal mood and affect. Behavior is normal.   Musculoskeletal: Right hand examination reveals mild circumferential swelling of the thumb.  She has tenderness to palpation at the IP joint with a slightly prominent osteophyte on the dorsal radial aspect of the  IP joint.  She has tenderness to palpation over the right thumb A1 pulley.  She is too guarded to allow passive range of motion attempts and with the active motion that she is able to demonstrate she is unable to reproduce any triggering or mechanical symptoms.  Sensation intact to light touch.    X-rays of the right thumb taken today demonstrate minimal arthritic changes.    Impression: Right thumb trigger finger.    Plan: We have discussed this diagnosis.  It is more common in patients with diabetes.  Poor diabetic control can influence the degree of symptom severity.  Treatment options include steroid injection or trigger thumb release.  She is apprehensive to consider steroid injections because of her fear of needles and pain.  We have also discussed that steroid injections can cause transient elevation of her blood sugar and with her poorly controlled diabetes this may be a joint good choice to avoid.  We have discussed surgical options for this as well.  At the end of the office visit today she was unable to make a decision as to whether she wanted to try injections or schedule surgery.  She has been given contact information for my office such that she can let us know how she wants to proceed and we can schedule her accordingly.    Micheal Troncoso MD    [facility] [employer]  Department of Orthopaedic Surgery  Chief of Hand and Upper Extremity Surgery  [facility]    Dictation performed with the use of voice recognition software. Syntax and grammatical errors may exist.

## 2024-07-18 NOTE — PROGRESS NOTES
Patient is a 68-year-old right-hand-dominant female with diabetes who presents today for evaluation of pain, swelling, stiffness and clicking type symptoms with her right thumb.  Symptoms have been present for several weeks.  She has not had any prior workup or treatment.  Her diabetes is relatively poorly controlled.  Hemoglobin A1c level from May of this year was 10.6%.    Patient's self reported past medical history, medications, allergies, surgical history, family and social history as well as a 10 point review of systems has been documented in the new patient intake form and scanned into the patient's electronic medical record. Pertinent findings are documented in the HPI.    Physical Examination Findings:  Constitutional: Appears well-developed and well-nourished.  Head: Normocephalic and atraumatic.  Eyes: Pupils are equal and round.  Cardiovascular: Intact distal pulses.   Respiratory: Effort normal. No respiratory distress.  Neurologic: Alert and oriented to person, place, and time.  Skin: Skin is warm and dry.  Hematologic / Lymphatic: No lymphedema, lymphangitis.  Psychiatric: normal mood and affect. Behavior is normal.   Musculoskeletal: Right hand examination reveals mild circumferential swelling of the thumb.  She has tenderness to palpation at the IP joint with a slightly prominent osteophyte on the dorsal radial aspect of the IP joint.  She has tenderness to palpation over the right thumb A1 pulley.  She is too guarded to allow passive range of motion attempts and with the active motion that she is able to demonstrate she is unable to reproduce any triggering or mechanical symptoms.  Sensation intact to light touch.    X-rays of the right thumb taken today demonstrate minimal arthritic changes.    Impression: Right thumb trigger finger.    Plan: We have discussed this diagnosis.  It is more common in patients with diabetes.  Poor diabetic control can influence the degree of symptom severity.   Treatment options include steroid injection or trigger thumb release.  She is apprehensive to consider steroid injections because of her fear of needles and pain.  We have also discussed that steroid injections can cause transient elevation of her blood sugar and with her poorly controlled diabetes this may be a joint good choice to avoid.  We have discussed surgical options for this as well.  At the end of the office visit today she was unable to make a decision as to whether she wanted to try injections or schedule surgery.  She has been given contact information for my office such that she can let us know how she wants to proceed and we can schedule her accordingly.    Micheal Troncoso MD    [facility] [employer]  Department of Orthopaedic Surgery  Chief of Hand and Upper Extremity Surgery  [facility]    Dictation performed with the use of voice recognition software. Syntax and grammatical errors may exist.

## 2024-07-27 DIAGNOSIS — N18.30 CKD STAGE 3 DUE TO TYPE 2 DIABETES MELLITUS (MULTI): ICD-10-CM

## 2024-07-27 DIAGNOSIS — E11.22 CKD STAGE 3 DUE TO TYPE 2 DIABETES MELLITUS (MULTI): ICD-10-CM

## 2024-07-27 RX ORDER — DULAGLUTIDE 4.5 MG/.5ML
4.5 INJECTION, SOLUTION SUBCUTANEOUS
Qty: 12 PEN | Refills: 0 | Status: SHIPPED | OUTPATIENT
Start: 2024-07-28 | End: 2024-10-26

## 2024-07-29 DIAGNOSIS — E11.22 CKD STAGE 3 DUE TO TYPE 2 DIABETES MELLITUS (MULTI): Primary | ICD-10-CM

## 2024-07-29 DIAGNOSIS — N18.30 CKD STAGE 3 DUE TO TYPE 2 DIABETES MELLITUS (MULTI): Primary | ICD-10-CM

## 2024-08-01 ENCOUNTER — NUTRITION (OUTPATIENT)
Dept: NUTRITION | Facility: CLINIC | Age: 69
End: 2024-08-01
Payer: MEDICARE

## 2024-08-01 VITALS — BODY MASS INDEX: 37.61 KG/M2 | WEIGHT: 234 LBS | HEIGHT: 66 IN

## 2024-08-01 DIAGNOSIS — N18.30 CKD STAGE 3 DUE TO TYPE 2 DIABETES MELLITUS (MULTI): ICD-10-CM

## 2024-08-01 DIAGNOSIS — E66.01 CLASS 2 SEVERE OBESITY DUE TO EXCESS CALORIES WITH SERIOUS COMORBIDITY AND BODY MASS INDEX (BMI) OF 37.0 TO 37.9 IN ADULT (MULTI): Primary | ICD-10-CM

## 2024-08-01 DIAGNOSIS — E11.22 CKD STAGE 3 DUE TO TYPE 2 DIABETES MELLITUS (MULTI): ICD-10-CM

## 2024-08-01 PROCEDURE — 97802 MEDICAL NUTRITION INDIV IN: CPT | Performed by: FAMILY MEDICINE

## 2024-08-01 NOTE — PATIENT INSTRUCTIONS
Nutrition Education Material: How to Build a Healthy Smoothie, Lifestyle Tips for Blood Sugar Control, and Planning Healthy Meals  Provided patient with my office phone # and email address for any further questions.

## 2024-08-01 NOTE — PROGRESS NOTES
"Nutrition Assessment     Reason for Visit:  Earline Villagran is a 68 y.o. female who presents for Diabetes, Obesity, CKD3    Anthropometrics:  Anthropometrics  Height: 167.6 cm (5' 6\")  Weight: 106 kg (234 lb)  BMI (Calculated): 37.79    Significant Past Medical Hx: HLD, Hypothyroid, Obesity, HTN, GERD    Biochemical/Laboratory Data:  Lab Results   Component Value Date    HGBA1C 10.6 (H) 05/19/2024    CHOL 222 (H) 12/29/2023    LDLF 198 (H) 08/09/2023    TRIG 161 (H) 12/29/2023    GFR 53, Cr 1.2, Ca/Phos/Mg WNL    Pertinent Medications: Trulicity     Food And Nutrient Intake:  Food and Nutrient History  Food and Nutrient History: Met w/ pt to obtain diet hx and instruct on diet guidelines for DM2 and CKD with strategies for weight loss.  Pt reports she would like to start to eat healthier, lose weight and have better blood sugars.  Checks bloods sugars qod  - fasting 200-300.  Diet hx indicates larger carb portions than recommended, less than recommended intake of fruits and vegs, frequent eating out, limited meal planning, imbalance of food groups at meals and some skipping of meals.  Fluid Intake: sparkling water, tea w/ a bit of honey, water  Food Allergy: none  Food Intolerance: none     Food Intake  Meal 1: strawb cheerios, soy milk, sometimes w/ fruit  Meal 2: arby's or burger susie sandwich w/  FF OR maybe a turkey sandwich  Meal 3: limited planning - frequently eats out OR chicken, pasta, veg  Snacks: likes chocolate, occasional ice cream    Food Preparation  Cooking: Patient  Grocery Shopping: Spouse/Significant Other  Dining Out: More than 3 times a week  Grocery Shopping Schedule: Weekly  Convenience/Processed Foods: Frozen Meals, Processed/Frozen Convenience Meals, Processed Salty Snacks, and Processed Sweet Snacks   3-4 times per week  Cooking Skills/Barriers: Time constraints and Low preference for cooking  Meal Preparation Habits: Has cooking skills, but does little cooking, Little preplanning of " meals, and Reports eats vegetables, but usually just at dinner  Eating Out Type: Breakfast, Lunch, Dinner, Fast Food, and Restaurant   General Food Category Consumption Habits: Typically 1 serving fruit/d, May have a vegetable 1x/d, Tends to have meals with larger portions of grains/starches, Often eats higher fat meats, Limits intake of red meat, Uses a milk alternative, Rarely consumes processed snack chips, and Rarely consumes sugar sweetened beverages    Physical Activities:  Physical Activity  Physical Activity History: reports limited physical activity; states her knees hurt; during school year she is busy and moves more-but less in summer  Nutrition Diagnosis      Nutrition Diagnosis  Patient has Nutrition Diagnosis: Yes  Diagnosis Status (1): New  Nutrition Diagnosis 1: Food and nutrition related knowledge deficit  Related to (1): diet guidelines for DM2  As Evidenced by (1): diet hx and questions asked with reported needs to learn    Nutrition Interventions/Recommendations   Food and Nutrition Delivery  Meals & Snacks: Carbohydrate-modified diet, Energy-modified diet, General Healthful Diet  Nutrition Education  Nutrition Education Content: Content related nutrition education, Education on nutrition's influence on health  Goals: 1) Aim to limit meals to 30gm carb  2) Include protein and fiber with meals/snacks  3) Aim for more preplanning of meals to decrease frequency of eating out  4) Aim to pack lunch 5) Include protein with breakfast  Reviewed w/ pt diet guidelines for DM.  Discussed the following topics: benefit of consistent meals/snack times; foods containing carbohydrates; recommended carbohydrate amounts for meals and snacks; MyPlate menu planning and portions; food label reading; benefits of protein and fiber with meals and snacks; guidelines for intake of sweets; planning meals and snacks according to diet guidelines and benefits of exercise and movement.     Nutrition Monitoring and Evaluation    Food/Nutrient Related History Monitoring  Monitoring and Evaluation Plan: Meal/snack pattern, Carbohydrate intake, Amount of food  Amount of Food: Estimated amout of food  Criteria: actual vs recommendatoon  Meal/Snack Pattern: Estimated meal and snack pattern  Criteria: decrease in eating out  Estimated carbohydrate intake: Estimated carbohydrate intake  Criteria: actual vs recommendation  Body Composition/Growth/Weight History  Monitoring and Evaluation Plan: Weight  Weight: Measured weight  Criteria: weight loss of 1-2#/wk

## 2024-08-08 ENCOUNTER — OFFICE VISIT (OUTPATIENT)
Dept: ORTHOPEDIC SURGERY | Facility: HOSPITAL | Age: 69
End: 2024-08-08
Payer: MEDICARE

## 2024-08-08 ENCOUNTER — HOSPITAL ENCOUNTER (OUTPATIENT)
Dept: RADIOLOGY | Facility: HOSPITAL | Age: 69
Discharge: HOME | End: 2024-08-08
Payer: MEDICARE

## 2024-08-08 VITALS — WEIGHT: 234 LBS | HEIGHT: 66 IN | BODY MASS INDEX: 37.61 KG/M2

## 2024-08-08 DIAGNOSIS — M25.562 PAIN IN BOTH KNEES, UNSPECIFIED CHRONICITY: ICD-10-CM

## 2024-08-08 DIAGNOSIS — M25.561 PAIN IN BOTH KNEES, UNSPECIFIED CHRONICITY: ICD-10-CM

## 2024-08-08 DIAGNOSIS — M25.561 ACUTE BILATERAL KNEE PAIN: ICD-10-CM

## 2024-08-08 DIAGNOSIS — M25.562 ACUTE BILATERAL KNEE PAIN: ICD-10-CM

## 2024-08-08 DIAGNOSIS — M17.0 BILATERAL PRIMARY OSTEOARTHRITIS OF KNEE: Primary | ICD-10-CM

## 2024-08-08 PROCEDURE — 1036F TOBACCO NON-USER: CPT | Performed by: STUDENT IN AN ORGANIZED HEALTH CARE EDUCATION/TRAINING PROGRAM

## 2024-08-08 PROCEDURE — 73560 X-RAY EXAM OF KNEE 1 OR 2: CPT | Mod: RT

## 2024-08-08 PROCEDURE — 2500000004 HC RX 250 GENERAL PHARMACY W/ HCPCS (ALT 636 FOR OP/ED): Performed by: STUDENT IN AN ORGANIZED HEALTH CARE EDUCATION/TRAINING PROGRAM

## 2024-08-08 PROCEDURE — 3046F HEMOGLOBIN A1C LEVEL >9.0%: CPT | Performed by: STUDENT IN AN ORGANIZED HEALTH CARE EDUCATION/TRAINING PROGRAM

## 2024-08-08 PROCEDURE — 1123F ACP DISCUSS/DSCN MKR DOCD: CPT | Performed by: STUDENT IN AN ORGANIZED HEALTH CARE EDUCATION/TRAINING PROGRAM

## 2024-08-08 PROCEDURE — 73562 X-RAY EXAM OF KNEE 3: CPT | Mod: LT

## 2024-08-08 PROCEDURE — 99214 OFFICE O/P EST MOD 30 MIN: CPT | Performed by: STUDENT IN AN ORGANIZED HEALTH CARE EDUCATION/TRAINING PROGRAM

## 2024-08-08 PROCEDURE — 1160F RVW MEDS BY RX/DR IN RCRD: CPT | Performed by: STUDENT IN AN ORGANIZED HEALTH CARE EDUCATION/TRAINING PROGRAM

## 2024-08-08 PROCEDURE — 1159F MED LIST DOCD IN RCRD: CPT | Performed by: STUDENT IN AN ORGANIZED HEALTH CARE EDUCATION/TRAINING PROGRAM

## 2024-08-08 PROCEDURE — 3008F BODY MASS INDEX DOCD: CPT | Performed by: STUDENT IN AN ORGANIZED HEALTH CARE EDUCATION/TRAINING PROGRAM

## 2024-08-08 PROCEDURE — 20610 DRAIN/INJ JOINT/BURSA W/O US: CPT | Mod: 50 | Performed by: STUDENT IN AN ORGANIZED HEALTH CARE EDUCATION/TRAINING PROGRAM

## 2024-08-08 PROCEDURE — 2500000005 HC RX 250 GENERAL PHARMACY W/O HCPCS: Performed by: STUDENT IN AN ORGANIZED HEALTH CARE EDUCATION/TRAINING PROGRAM

## 2024-08-08 ASSESSMENT — PAIN SCALES - GENERAL: PAINLEVEL_OUTOF10: 10 - WORST POSSIBLE PAIN

## 2024-08-08 ASSESSMENT — PAIN - FUNCTIONAL ASSESSMENT: PAIN_FUNCTIONAL_ASSESSMENT: 0-10

## 2024-08-08 NOTE — PROGRESS NOTES
PRIMARY CARE PHYSICIAN: Patricia Calderon MD  REFERRING PROVIDER: No referring provider defined for this encounter.     CONSULT ORTHOPAEDIC: Knee Evaluation    ASSESSMENT & PLAN    Impression: 68 y.o. female with bilateral knee moderate to advanced osteoarthritis.    Plan:   I explained to the patient the nature of their diagnosis.  I reviewed their imaging studies with them.    Based on the history, physical exam and imaging studies above, the patient's presentation is consistent with the above diagnosis.  I had a long discussion with the patient regarding their presentation and the treatment options.  We discussed initial nonoperative versus operative management options as well as potential further diagnostic imaging.  I reviewed the patient's x-ray findings with her.  We discussed next treatment steps.  She states she would like to continue with nonoperative management.  I offered her bilateral corticosteroid injections which she agreed to and received as described below.  She tolerated these well.  I provided her with a referral to physical therapy to work on quadricep strengthening and hamstring flexibility with a home exercise program and anti-inflammatory modalities as needed.  She will focus on low impact activities and practice good weight management    Follow-Up: Patient will follow-up as needed    At the end of the visit, all questions were answered in full. The patient is in agreement with the plan and recommendations. They will call the office with any questions/concerns.    Note dictated with [employer] software. Completed without full typed error editing and sent to avoid delay.       SUBJECTIVE  CHIEF COMPLAINT:   Chief Complaint   Patient presents with    Right Knee - Pain    Left Knee - Pain        HPI: Earline Villagran is a 68 y.o. patient who presents today with bilateral knee pain. XR today. Earilne states that both her knees have been bothering her since early June. She has  been walking on cement floors at work, which she believes is causing this pain. Earline states that her knee pain is worse in the morning and when walking up and down stairs. She denies any specific trauma to either knee. She is interested in bilateral knee CSI today.     They deny any constant or progressive numbness or tingling in their legs.     REVIEW OF SYSTEMS  Constitutional: See HPI for pain assessment, No significant weight loss, recent trauma  Cardiovascular: No chest pain, shortness of breath  Respiratory: No difficulty breathing, cough  Gastrointestinal: No nausea, vomiting, diarrhea, constipation  Musculoskeletal: Noted in HPI, positive for pain, restricted motion, stiffness  Integumentary: No rashes, easy bruising, redness   Neurological: no numbness or tingling in extremities, no gait disturbances   Psychiatric: No mood changes, memory changes, social issues  Heme/Lymph: no excessive swelling, easy bruising, excessive bleeding  ENT: No hearing changes  Eyes: No vision changes    Past Medical History:   Diagnosis Date    Concussion with loss of consciousness of 30 minutes or less, subsequent encounter 11/29/2021    Concussion with loss of consciousness of 30 minutes or less, subsequent encounter    Personal history of other diseases of the circulatory system     History of hypertension    Personal history of other diseases of the female genital tract 12/21/2020    History of vaginitis    Personal history of other diseases of the respiratory system 09/22/2022    History of sinusitis    Personal history of other endocrine, nutritional and metabolic disease     History of diabetes mellitus    Personal history of other endocrine, nutritional and metabolic disease 08/25/2021    History of morbid obesity    Personal history of other infectious and parasitic diseases 04/28/2022    History of herpes zoster    Personal history of other specified conditions     History of seizure    Personal history of other  specified conditions 12/12/2020    History of vertigo    Personal history of other specified conditions 01/27/2021    History of nasal congestion        Allergies   Allergen Reactions    Shrimp Anaphylaxis    Ciprofloxacin Itching    Clindamycin Diarrhea    Green Tea Swelling    Penicillins Syncope    Sulfamethoxazole-Trimethoprim Hives and Unknown    Phenylephrine-Guaifenesin Palpitations        Past Surgical History:   Procedure Laterality Date    OTHER SURGICAL HISTORY  01/02/2021    Subtotal thyroidectomy        Family History   Problem Relation Name Age of Onset    Breast cancer Cousin      Ovarian cancer Cousin          Social History     Socioeconomic History    Marital status:      Spouse name: Not on file    Number of children: Not on file    Years of education: Not on file    Highest education level: Not on file   Occupational History    Not on file   Tobacco Use    Smoking status: Former     Types: Cigarettes     Passive exposure: Never    Smokeless tobacco: Never   Vaping Use    Vaping status: Never Used   Substance and Sexual Activity    Alcohol use: Yes    Drug use: Never    Sexual activity: Defer   Other Topics Concern    Not on file   Social History Narrative    Not on file     Social Determinants of Health     Financial Resource Strain: Not on file   Food Insecurity: Not on file   Transportation Needs: Not on file   Physical Activity: Not on file   Stress: Not on file   Social Connections: Not on file   Intimate Partner Violence: Not on file   Housing Stability: Not on file        CURRENT MEDICATIONS:   Current Outpatient Medications   Medication Sig Dispense Refill    acetaminophen (Tylenol) 325 mg tablet Take 3 tablets (975 mg) by mouth every 6 hours if needed for moderate pain (4 - 6) or mild pain (1 - 3). 30 tablet 0    amLODIPine (Norvasc) 5 mg tablet TAKE 1 TABLET BY MOUTH DAILY 90 tablet 1    aspirin 81 mg EC tablet Take 1 tablet (81 mg) by mouth once daily.      atorvastatin (Lipitor)  "40 mg tablet Take 1 tablet (40 mg) by mouth once daily. 90 tablet 2    blood sugar diagnostic (OneTouch Ultra Test) strip Test glucose 2-3 times a day. 200 strip 2    carvedilol (Coreg) 3.125 mg tablet Take 1 tablet (3.125 mg) by mouth 2 times daily (morning and late afternoon). 60 tablet 2    cyclobenzaprine (Flexeril) 10 mg tablet Take 1 tablet (10 mg) by mouth 3 times a day as needed for muscle spasms. 30 tablet 0    dulaglutide (Trulicity) 4.5 mg/0.5 mL pen injector Inject 4.5 mg under the skin 1 (one) time per week. 12 Pen 0    fluticasone (Flonase) 50 mcg/actuation nasal spray Administer 2 sprays into each nostril once daily. 16 g 1    hydroCHLOROthiazide (HYDRODiuril) 25 mg tablet TAKE 1 TABLET BY MOUTH ONCE  DAILY 90 tablet 1    levothyroxine (Synthroid, Levoxyl) 125 mcg tablet TAKE 1 TABLET BY MOUTH ONCE  DAILY 90 tablet 0    loratadine 10 mg capsule Take 10 mg by mouth once daily.      omeprazole (PriLOSEC) 20 mg tablet,delayed release (DR/EC) EC tablet Take 1 tablet (20 mg) by mouth once daily.       No current facility-administered medications for this visit.        OBJECTIVE    PHYSICAL EXAM      5/19/2024    11:32 PM 5/20/2024     5:09 AM 5/20/2024     9:00 AM 5/28/2024    10:37 AM 5/30/2024     8:02 AM 7/18/2024     9:40 AM 8/1/2024    12:42 PM   Vitals   Systolic 139 141 113 118      Diastolic 84 84 78 70      Heart Rate 83 78 83 105      Temp  36.6 °C (97.9 °F) 36.9 °C (98.4 °F) 36.8 °C (98.2 °F)      Resp 18 18 18       Height (in)    1.676 m (5' 6\") 1.676 m (5' 6\") 1.676 m (5' 6\") 1.676 m (5' 6\")   Weight (lb)    233.8 233 233 234   BMI    37.74 kg/m2 37.61 kg/m2 37.61 kg/m2 37.77 kg/m2   BSA (m2)    2.22 m2 2.22 m2 2.22 m2 2.22 m2   Visit Report    Report Report Report       There is no height or weight on file to calculate BMI.    GENERAL: A/Ox3, NAD. Appears healthy, well nourished  PSYCHIATRIC: Mood stable, appropriate memory recall  EYES: EOM intact, no scleral icterus  CARDIOVASCULAR: " Palpable peripheral pulses  LUNGS: Breathing non-labored on room air  SKIN: no erythema, rashes, or ecchymoses     MUSCULOSKELETAL:  Laterality: bilateral Knee Exam  - Skin intact  - No erythema or warmth  - No ecchymosis or soft tissue swelling  - Alignment: valgus  - Palpation: Positive tenderness medial and lateral joint lines, positive tenderness medial and lateral patellar facets  - ROM: 0-2-115  - Effusion: Small  - Strength: knee extension and flexion 5/5, EHL/PF/DF motor intact  - Stability:        Anterior drawer stable       Posterior drawer stable       Varus/valgus stable       negative Lachman  - positive Jc's  - Gait: Antalgic  - Hip Exam: flexion to 100+ degrees, full extension, internal/external rotation adequate, and no pain with log roll    NEUROVASCULAR:  - Neurovascular Status: sensation intact to light touch distally, lower extremity motor intact  - Capillary refill brisk at extremities, Bilateral dorsalis pedis pulse 2+    Imaging: Multiple views of the affected bilateral knee(s) demonstrate: Moderate to advanced tricompartmental degenerative changes worse in the lateral compartments.   X-rays were personally reviewed and interpreted by me.  Radiology reports were reviewed by me as well, if readily available at the time.    L Inj/Asp: bilateral knee on 8/8/2024 9:30 AM  Indications: pain  Details: 25 G needle, anterolateral approach  Medications (Right): 40 mg triamcinolone acetonide 40 mg/mL; 2 mL lidocaine 10 mg/mL (1 %)  Medications (Left): 40 mg triamcinolone acetonide 40 mg/mL; 2 mL lidocaine 10 mg/mL (1 %)  Outcome: tolerated well, no immediate complications  Procedure, treatment alternatives, risks and benefits explained, specific risks discussed. Consent was given by the patient. Immediately prior to procedure a time out was called to verify the correct patient, procedure, equipment, support staff and site/side marked as required. Patient was prepped and draped in the usual  sterile fashion.               Willie Reyes MD  Attending Surgeon    Sports Medicine Orthopaedic Surgery  [facility]

## 2024-08-13 RX ORDER — LIDOCAINE HYDROCHLORIDE 10 MG/ML
2 INJECTION INFILTRATION; PERINEURAL
Status: COMPLETED | OUTPATIENT
Start: 2024-08-08 | End: 2024-08-08

## 2024-08-13 RX ORDER — TRIAMCINOLONE ACETONIDE 40 MG/ML
40 INJECTION, SUSPENSION INTRA-ARTICULAR; INTRAMUSCULAR
Status: COMPLETED | OUTPATIENT
Start: 2024-08-08 | End: 2024-08-08

## 2024-08-19 DIAGNOSIS — R39.9 UTI SYMPTOMS: Primary | ICD-10-CM

## 2024-08-19 DIAGNOSIS — J01.90 ACUTE NON-RECURRENT SINUSITIS, UNSPECIFIED LOCATION: Primary | ICD-10-CM

## 2024-08-19 RX ORDER — DOXYCYCLINE 100 MG/1
100 CAPSULE ORAL 2 TIMES DAILY
Qty: 20 CAPSULE | Refills: 0 | Status: SHIPPED | OUTPATIENT
Start: 2024-08-19 | End: 2024-08-29

## 2024-08-20 DIAGNOSIS — I50.30 DIASTOLIC CONGESTIVE HEART FAILURE, UNSPECIFIED HF CHRONICITY (MULTI): ICD-10-CM

## 2024-08-20 RX ORDER — CARVEDILOL 3.12 MG/1
TABLET ORAL
Qty: 180 TABLET | Refills: 1 | Status: SHIPPED | OUTPATIENT
Start: 2024-08-20

## 2024-08-21 ENCOUNTER — LAB (OUTPATIENT)
Dept: LAB | Facility: LAB | Age: 69
End: 2024-08-21
Payer: MEDICARE

## 2024-08-21 DIAGNOSIS — R39.9 UTI SYMPTOMS: ICD-10-CM

## 2024-08-21 PROCEDURE — 81001 URINALYSIS AUTO W/SCOPE: CPT

## 2024-08-21 PROCEDURE — 87086 URINE CULTURE/COLONY COUNT: CPT

## 2024-08-22 LAB
APPEARANCE UR: ABNORMAL
BILIRUB UR STRIP.AUTO-MCNC: NEGATIVE MG/DL
COLOR UR: ABNORMAL
GLUCOSE UR STRIP.AUTO-MCNC: ABNORMAL MG/DL
KETONES UR STRIP.AUTO-MCNC: NEGATIVE MG/DL
LEUKOCYTE ESTERASE UR QL STRIP.AUTO: ABNORMAL
NITRITE UR QL STRIP.AUTO: ABNORMAL
PH UR STRIP.AUTO: 5 [PH]
PROT UR STRIP.AUTO-MCNC: ABNORMAL MG/DL
RBC # UR STRIP.AUTO: NEGATIVE /UL
RBC #/AREA URNS AUTO: NORMAL /HPF
SP GR UR STRIP.AUTO: 1.01
UROBILINOGEN UR STRIP.AUTO-MCNC: NORMAL MG/DL
WBC #/AREA URNS AUTO: NORMAL /HPF

## 2024-08-23 LAB — BACTERIA UR CULT: ABNORMAL

## 2024-08-30 ENCOUNTER — TELEPHONE (OUTPATIENT)
Dept: PRIMARY CARE | Facility: CLINIC | Age: 69
End: 2024-08-30
Payer: MEDICARE

## 2024-09-11 DIAGNOSIS — R39.9 UTI SYMPTOMS: Primary | ICD-10-CM

## 2024-09-12 ENCOUNTER — APPOINTMENT (OUTPATIENT)
Dept: RADIOLOGY | Facility: HOSPITAL | Age: 69
End: 2024-09-12
Payer: MEDICARE

## 2024-09-12 ENCOUNTER — HOSPITAL ENCOUNTER (EMERGENCY)
Facility: HOSPITAL | Age: 69
Discharge: HOME | End: 2024-09-12
Attending: EMERGENCY MEDICINE
Payer: MEDICARE

## 2024-09-12 VITALS
HEIGHT: 66 IN | OXYGEN SATURATION: 97 % | DIASTOLIC BLOOD PRESSURE: 83 MMHG | RESPIRATION RATE: 14 BRPM | WEIGHT: 230 LBS | SYSTOLIC BLOOD PRESSURE: 153 MMHG | HEART RATE: 76 BPM | TEMPERATURE: 97.6 F | BODY MASS INDEX: 36.96 KG/M2

## 2024-09-12 DIAGNOSIS — N39.0 ACUTE LOWER UTI: Primary | ICD-10-CM

## 2024-09-12 LAB
ALBUMIN SERPL BCP-MCNC: 3.7 G/DL (ref 3.4–5)
ALP SERPL-CCNC: 72 U/L (ref 33–136)
ALT SERPL W P-5'-P-CCNC: 17 U/L (ref 7–45)
ANION GAP SERPL CALC-SCNC: 12 MMOL/L (ref 10–20)
APPEARANCE UR: ABNORMAL
AST SERPL W P-5'-P-CCNC: 15 U/L (ref 9–39)
BACTERIA #/AREA URNS AUTO: ABNORMAL /HPF
BASOPHILS # BLD AUTO: 0.06 X10*3/UL (ref 0–0.1)
BASOPHILS NFR BLD AUTO: 0.8 %
BILIRUB DIRECT SERPL-MCNC: 0.1 MG/DL (ref 0–0.3)
BILIRUB SERPL-MCNC: 0.4 MG/DL (ref 0–1.2)
BILIRUB UR STRIP.AUTO-MCNC: NEGATIVE MG/DL
BUN SERPL-MCNC: 15 MG/DL (ref 6–23)
CALCIUM SERPL-MCNC: 8.9 MG/DL (ref 8.6–10.3)
CHLORIDE SERPL-SCNC: 97 MMOL/L (ref 98–107)
CO2 SERPL-SCNC: 31 MMOL/L (ref 21–32)
COLOR UR: YELLOW
CREAT SERPL-MCNC: 1.11 MG/DL (ref 0.5–1.05)
EGFRCR SERPLBLD CKD-EPI 2021: 54 ML/MIN/1.73M*2
EOSINOPHIL # BLD AUTO: 0.08 X10*3/UL (ref 0–0.7)
EOSINOPHIL NFR BLD AUTO: 1.1 %
ERYTHROCYTE [DISTWIDTH] IN BLOOD BY AUTOMATED COUNT: 13.2 % (ref 11.5–14.5)
GLUCOSE BLD MANUAL STRIP-MCNC: 145 MG/DL (ref 74–99)
GLUCOSE SERPL-MCNC: 224 MG/DL (ref 74–99)
GLUCOSE UR STRIP.AUTO-MCNC: ABNORMAL MG/DL
HCT VFR BLD AUTO: 39.9 % (ref 36–46)
HGB BLD-MCNC: 13.2 G/DL (ref 12–16)
HOLD SPECIMEN: NORMAL
IMM GRANULOCYTES # BLD AUTO: 0.04 X10*3/UL (ref 0–0.7)
IMM GRANULOCYTES NFR BLD AUTO: 0.5 % (ref 0–0.9)
KETONES UR STRIP.AUTO-MCNC: NEGATIVE MG/DL
LEUKOCYTE ESTERASE UR QL STRIP.AUTO: ABNORMAL
LYMPHOCYTES # BLD AUTO: 1.89 X10*3/UL (ref 1.2–4.8)
LYMPHOCYTES NFR BLD AUTO: 24.9 %
MCH RBC QN AUTO: 29.6 PG (ref 26–34)
MCHC RBC AUTO-ENTMCNC: 33.1 G/DL (ref 32–36)
MCV RBC AUTO: 90 FL (ref 80–100)
MONOCYTES # BLD AUTO: 0.61 X10*3/UL (ref 0.1–1)
MONOCYTES NFR BLD AUTO: 8 %
MUCOUS THREADS #/AREA URNS AUTO: ABNORMAL /LPF
NEUTROPHILS # BLD AUTO: 4.92 X10*3/UL (ref 1.2–7.7)
NEUTROPHILS NFR BLD AUTO: 64.7 %
NITRITE UR QL STRIP.AUTO: NEGATIVE
NRBC BLD-RTO: 0 /100 WBCS (ref 0–0)
PH UR STRIP.AUTO: 6 [PH]
PLATELET # BLD AUTO: 228 X10*3/UL (ref 150–450)
POTASSIUM SERPL-SCNC: 3.9 MMOL/L (ref 3.5–5.3)
PROT SERPL-MCNC: 7.4 G/DL (ref 6.4–8.2)
PROT UR STRIP.AUTO-MCNC: ABNORMAL MG/DL
RBC # BLD AUTO: 4.46 X10*6/UL (ref 4–5.2)
RBC # UR STRIP.AUTO: NEGATIVE /UL
RBC #/AREA URNS AUTO: ABNORMAL /HPF
SODIUM SERPL-SCNC: 136 MMOL/L (ref 136–145)
SP GR UR STRIP.AUTO: 1.03
SQUAMOUS #/AREA URNS AUTO: ABNORMAL /HPF
TRANS CELLS #/AREA UR COMP ASSIST: ABNORMAL /HPF
UROBILINOGEN UR STRIP.AUTO-MCNC: NORMAL MG/DL
WBC # BLD AUTO: 7.6 X10*3/UL (ref 4.4–11.3)
WBC #/AREA URNS AUTO: ABNORMAL /HPF

## 2024-09-12 PROCEDURE — 2550000001 HC RX 255 CONTRASTS: Performed by: EMERGENCY MEDICINE

## 2024-09-12 PROCEDURE — 96360 HYDRATION IV INFUSION INIT: CPT | Mod: 59

## 2024-09-12 PROCEDURE — 87086 URINE CULTURE/COLONY COUNT: CPT | Mod: AHULAB | Performed by: EMERGENCY MEDICINE

## 2024-09-12 PROCEDURE — 2500000004 HC RX 250 GENERAL PHARMACY W/ HCPCS (ALT 636 FOR OP/ED): Performed by: EMERGENCY MEDICINE

## 2024-09-12 PROCEDURE — 99284 EMERGENCY DEPT VISIT MOD MDM: CPT | Mod: 25

## 2024-09-12 PROCEDURE — 82248 BILIRUBIN DIRECT: CPT | Performed by: EMERGENCY MEDICINE

## 2024-09-12 PROCEDURE — 85025 COMPLETE CBC W/AUTO DIFF WBC: CPT | Performed by: EMERGENCY MEDICINE

## 2024-09-12 PROCEDURE — 81003 URINALYSIS AUTO W/O SCOPE: CPT | Performed by: EMERGENCY MEDICINE

## 2024-09-12 PROCEDURE — 96365 THER/PROPH/DIAG IV INF INIT: CPT | Mod: 59

## 2024-09-12 PROCEDURE — 74177 CT ABD & PELVIS W/CONTRAST: CPT

## 2024-09-12 PROCEDURE — 36415 COLL VENOUS BLD VENIPUNCTURE: CPT | Performed by: EMERGENCY MEDICINE

## 2024-09-12 PROCEDURE — 82947 ASSAY GLUCOSE BLOOD QUANT: CPT

## 2024-09-12 PROCEDURE — 74177 CT ABD & PELVIS W/CONTRAST: CPT | Performed by: RADIOLOGY

## 2024-09-12 RX ORDER — CEPHALEXIN 500 MG/1
500 CAPSULE ORAL 2 TIMES DAILY
Qty: 14 CAPSULE | Refills: 0 | Status: SHIPPED | OUTPATIENT
Start: 2024-09-12 | End: 2024-09-12

## 2024-09-12 RX ORDER — CEFTRIAXONE 1 G/50ML
1 INJECTION, SOLUTION INTRAVENOUS ONCE
Status: COMPLETED | OUTPATIENT
Start: 2024-09-12 | End: 2024-09-12

## 2024-09-12 RX ORDER — CEPHALEXIN 500 MG/1
500 CAPSULE ORAL 2 TIMES DAILY
Qty: 14 CAPSULE | Refills: 0 | Status: SHIPPED | OUTPATIENT
Start: 2024-09-12 | End: 2024-09-19

## 2024-09-12 ASSESSMENT — PAIN DESCRIPTION - PROGRESSION: CLINICAL_PROGRESSION: NOT CHANGED

## 2024-09-12 ASSESSMENT — PAIN SCALES - GENERAL: PAINLEVEL_OUTOF10: 8

## 2024-09-12 ASSESSMENT — PAIN DESCRIPTION - PAIN TYPE: TYPE: ACUTE PAIN

## 2024-09-12 ASSESSMENT — PAIN DESCRIPTION - LOCATION: LOCATION: ABDOMEN

## 2024-09-12 ASSESSMENT — COLUMBIA-SUICIDE SEVERITY RATING SCALE - C-SSRS
1. IN THE PAST MONTH, HAVE YOU WISHED YOU WERE DEAD OR WISHED YOU COULD GO TO SLEEP AND NOT WAKE UP?: NO
6. HAVE YOU EVER DONE ANYTHING, STARTED TO DO ANYTHING, OR PREPARED TO DO ANYTHING TO END YOUR LIFE?: NO
2. HAVE YOU ACTUALLY HAD ANY THOUGHTS OF KILLING YOURSELF?: NO

## 2024-09-12 ASSESSMENT — PAIN - FUNCTIONAL ASSESSMENT: PAIN_FUNCTIONAL_ASSESSMENT: 0-10

## 2024-09-12 ASSESSMENT — PAIN DESCRIPTION - DESCRIPTORS: DESCRIPTORS: CRAMPING

## 2024-09-12 ASSESSMENT — PAIN DESCRIPTION - ORIENTATION: ORIENTATION: LEFT

## 2024-09-12 NOTE — ED TRIAGE NOTES
Pt arrives to the ED with c/o UTI symptoms that started about a week ago. Pt is having abdominal pain and burning with urination. Pt states her urine is orange, but she has been OTC AZO for the past two days. Per pt she went to get her urine tested early September but didn't get her results back.

## 2024-09-12 NOTE — ED PROVIDER NOTES
HPI   Chief Complaint   Patient presents with    Female Dysuria       HPI: []  68-year-old female with a history of hypertension, diabetes, dyslipidemia comes in with urine frequency urgency for the last 2 days.  She states she took Azo over-the-counter.  No getting better.  She also complains of left lower quadrant abdominal pain.  She has no nausea vomiting diarrhea constipation no fever no chills no hematemesis melena medic easier hemoptysis no recent travel hospitalization or antibiotic use.       Past history: Hypertension, diabetes, dyslipidemia  Social: Patient denies current tobacco alcohol drug abuse.  REVIEW OF SYSTEMS:    GENERAL.: No weight loss, fatigue, anorexia, insomnia, fever.    EYES: No vision loss, double vision, drainage, eye pain.    ENT: No pharyngitis, dry mouth.    CARDIOPULMONARY: No chest pain, palpitations, syncope, near syncope. No shortness of breath, cough, hemoptysis.    GI: Positive for abdominal pain, change in bowel habits, melena, hematemesis, hematochezia, nausea, vomiting, diarrhea.    : No discharge, positive for dysuria, frequency, positive for urgency, hematuria.    MS: No limb pain, joint pain, joint swelling.    SKIN: No rashes.    PSYCH: No depression, anxiety, suicidality, homicidality.    Review of systems is otherwise negative unless stated above or in history of present illness.  Social history, family history, allergies reviewed.  PHYSICAL EXAM:    GENERAL: Vitals noted, no distress. Alert and oriented  x 3. Non-toxic.      EENT: TMs clear. Posterior oropharynx unremarkable. No meningismus. No LAD.     NECK: Supple. Nontender. No midline tenderness.     CARDIAC: Regular, rate, rhythm. No murmurs rubs or gallops. No JVD    PULMONARY: Lungs clear bilaterally with good aeration. No wheezes rales or rhonchi. No respiratory distress.     ABDOMEN: Soft, nonsurgical.  Tender left lower quadrant no rebound or guarding negative CVA tenderness no inguinal hernias. No  peritoneal signs. Normoactive bowel sounds. No pulsatile masses.     EXTREMITIES: No peripheral edema. Negative Homans bilaterally, no cords.  2+ bounding pulses well-perfused.    SKIN: No rash. Intact.     NEURO: No focal neurologic deficits, NIH score of 0. Cranial nerves normal as tested from II through XII.     MEDICAL DECISION MAKING:  CBC with differential chemistries LFTs unremarkable UA shows a UTI abdominal CAT scan.  Negative.  No pyelonephritis or diverticulitis.    Treatment in ED: IV established urine sent for culture given IV fluids and IV Rocephin.  ED course: Repeat assessment much better.  Remains afebrile normotensive no tachycardia or hypoxia.  Impression: #1 urine tract infection  Plan/MDM: 78-year-old female history comes in with UTI symptoms low concern for bacteremia pyelonephritis kidney stone or diverticulitis, will be discharged home with Keflex for 7 days close outpatient follow-up recommended with strict return precaution.              Patient History   Past Medical History:   Diagnosis Date    Concussion with loss of consciousness of 30 minutes or less, subsequent encounter 11/29/2021    Concussion with loss of consciousness of 30 minutes or less, subsequent encounter    Personal history of other diseases of the circulatory system     History of hypertension    Personal history of other diseases of the female genital tract 12/21/2020    History of vaginitis    Personal history of other diseases of the respiratory system 09/22/2022    History of sinusitis    Personal history of other endocrine, nutritional and metabolic disease     History of diabetes mellitus    Personal history of other endocrine, nutritional and metabolic disease 08/25/2021    History of morbid obesity    Personal history of other infectious and parasitic diseases 04/28/2022    History of herpes zoster    Personal history of other specified conditions     History of seizure    Personal history of other specified  conditions 12/12/2020    History of vertigo    Personal history of other specified conditions 01/27/2021    History of nasal congestion     Past Surgical History:   Procedure Laterality Date    OTHER SURGICAL HISTORY  01/02/2021    Subtotal thyroidectomy     Family History   Problem Relation Name Age of Onset    Breast cancer Cousin      Ovarian cancer Cousin       Social History     Tobacco Use    Smoking status: Former     Types: Cigarettes     Passive exposure: Never    Smokeless tobacco: Never   Vaping Use    Vaping status: Never Used   Substance Use Topics    Alcohol use: Yes    Drug use: Never       Physical Exam   ED Triage Vitals   Temperature Heart Rate Respirations BP   09/12/24 0746 09/12/24 0737 09/12/24 0737 09/12/24 0737   36.4 °C (97.6 °F) 94 17 146/89      Pulse Ox Temp Source Heart Rate Source Patient Position   09/12/24 0737 09/12/24 0746 -- --   95 % Oral        BP Location FiO2 (%)     -- --             Physical Exam      ED Course & MDM   ED Course as of 09/12/24 1505   Thu Sep 12, 2024   1348 UA concerning for UTI, CBC with differential chemistries unremarkable abdominal CAT scan negative patient received IV Rocephin will be discharged home with oral Keflex close outpatient follow-up recommended with strict return precautions currently no signs of pyelonephritis or systemic infection or bacteremia. [MT]      ED Course User Index  [MT] Dawn Tsang MD         Diagnoses as of 09/12/24 1505   Acute lower UTI                 No data recorded     Melissa Coma Scale Score: 15 (09/12/24 0740 : Rufina Ward RN)                           Medical Decision Making      Procedure  Procedures     Dawn Tsang MD  09/12/24 1508

## 2024-09-18 ENCOUNTER — EVALUATION (OUTPATIENT)
Dept: PHYSICAL THERAPY | Facility: CLINIC | Age: 69
End: 2024-09-18
Payer: MEDICARE

## 2024-09-18 DIAGNOSIS — M25.562 PAIN IN BOTH KNEES, UNSPECIFIED CHRONICITY: Primary | ICD-10-CM

## 2024-09-18 DIAGNOSIS — M25.561 PAIN IN BOTH KNEES, UNSPECIFIED CHRONICITY: Primary | ICD-10-CM

## 2024-09-18 PROBLEM — G89.29 CHRONIC PAIN OF BOTH KNEES: Status: ACTIVE | Noted: 2024-09-18

## 2024-09-18 PROCEDURE — 97110 THERAPEUTIC EXERCISES: CPT | Mod: GP | Performed by: PHYSICAL THERAPIST

## 2024-09-18 PROCEDURE — 97162 PT EVAL MOD COMPLEX 30 MIN: CPT | Mod: GP | Performed by: PHYSICAL THERAPIST

## 2024-09-18 ASSESSMENT — ENCOUNTER SYMPTOMS
LOSS OF SENSATION IN FEET: 0
OCCASIONAL FEELINGS OF UNSTEADINESS: 1
DEPRESSION: 1

## 2024-09-18 NOTE — PROGRESS NOTES
Physical Therapy    Physical Therapy    Physical Therapy Evaluation and Treatment      Patient Name: Earline Villagran  MRN: [id number]  Today's Date: 9/18/2024    Time Entry:   Time Calculation  Start Time: 1515  Stop Time: 1605  Time Calculation (min): 50 min  PT Evaluation Time Entry  PT Evaluation (Moderate) Time Entry: 35  PT Therapeutic Procedures Time Entry  Therapeutic Exercise Time Entry: 15                   Assessment:    Patient presents with clinical signs and symptoms  L > R knee pain  secondary to OA.  These impairments affect ADLs, work, recreational activity, exercise, transfer ability, ambulation, and sleep function that requires skilled PT intervention to resolve and enable patient to return to previous level of function. Factors that may affect progress in PT are chronic pain, obesity, medical co-morbidities, cognitive function,depression, and patient compliance.  Patient response to initial treatment of   education and initial hip mobility exercise  was understood by Earline Villagran   Plan:  OP PT Plan  Treatment/Interventions: Cryotherapy, Education/ Instruction, Gait training, Hot pack, Manual therapy, Neuromuscular re-education, Taping techniques, Therapeutic activities, Therapeutic exercises, Aquatic therapy  PT Plan: Skilled PT  PT Frequency: 1 time per week  Onset Date: 01/01/24  Certification Period Start Date: 09/18/24  Certification Period End Date: 12/17/24  Rehab Potential: Fair  Plan of Care Agreement: Patient    Current Problem:   1. Pain in both knees, unspecified chronicity  Referral to Physical Therapy    Follow Up In Physical Therapy        Problem List Items Addressed This Visit             ICD-10-CM    Pain in both knees - Primary M25.561, M25.562    Relevant Orders    Follow Up In Physical Therapy      Subjective    Bilat knee injections in early August that helped a bit. C/o of difficulty sit to stand and get up/down step     Pain:    Bilat knee anterior knee 8/10  Home  Living:   Lives with spouse in 2 story home  Prior Level of Function:  Prior Function Per Pt/Caregiver Report  Hand Dominance: RightPatricia SHAUN Villagran works part time as  in [employer] system    Objective   Cognition:   A+Ox3  Observation:    L knee sleeve helps  Hip and ankle joint clearance:  L hip Flexion 100 deg P IR restricted and pain  Knee ROM: Flexion  AROM  R 115  L102  deg P , Extension  AROM  R  0L  0deg    Knee Strength:  Flex  R 4/5   L4 /5,  Ext R  3+/5 P L3+ /5 P     Hip Strength:  Abduction  R 3 /5  L  3/5                   Flexion R  3+/5   L 3+ /5    Palpation: tenderness entire L LE from hip to distal shin    Gait: antalgic   L LE   Swelling    joint effusion  no   synovial thickening  yes          Outcome Measures:  LEFS:47/80    Treatments:  There ex:  Supine knees side to side 2x 10  EDUCATION:     extensive education regarding mechanism of injury, relevant functional anatomy, treatment program rational, self management, HEP, and POC     Goals:  1. Decrease pain to 4/10 severity level   2. Increase  L knee flexion AROM to   110 deg   3. Increase bilat hip and nee strength to   4/5 MMT grade  4. Improve ambulation ability to least restrictive device/ no device on level surface and steps at least 300   feet community distances  5. Increase L hip flexion to 110 deg without pain  6. Improve out come score by 15 points  7. independent Home exercise program

## 2024-09-23 ENCOUNTER — APPOINTMENT (OUTPATIENT)
Dept: NUTRITION | Facility: CLINIC | Age: 69
End: 2024-09-23
Payer: MEDICARE

## 2024-09-26 ENCOUNTER — APPOINTMENT (OUTPATIENT)
Dept: PRIMARY CARE | Facility: CLINIC | Age: 69
End: 2024-09-26
Payer: MEDICARE

## 2024-09-26 VITALS
TEMPERATURE: 97.9 F | HEART RATE: 85 BPM | OXYGEN SATURATION: 96 % | SYSTOLIC BLOOD PRESSURE: 140 MMHG | BODY MASS INDEX: 36.9 KG/M2 | DIASTOLIC BLOOD PRESSURE: 72 MMHG | HEIGHT: 66 IN | WEIGHT: 229.6 LBS

## 2024-09-26 DIAGNOSIS — Z79.4 TYPE 2 DIABETES MELLITUS WITH HYPERGLYCEMIA, WITH LONG-TERM CURRENT USE OF INSULIN: ICD-10-CM

## 2024-09-26 DIAGNOSIS — N18.30 CKD STAGE 3 DUE TO TYPE 2 DIABETES MELLITUS (MULTI): ICD-10-CM

## 2024-09-26 DIAGNOSIS — Z00.00 ROUTINE GENERAL MEDICAL EXAMINATION AT HEALTH CARE FACILITY: Primary | ICD-10-CM

## 2024-09-26 DIAGNOSIS — E78.5 HYPERLIPIDEMIA, UNSPECIFIED HYPERLIPIDEMIA TYPE: ICD-10-CM

## 2024-09-26 DIAGNOSIS — E11.22 CKD STAGE 3 DUE TO TYPE 2 DIABETES MELLITUS (MULTI): ICD-10-CM

## 2024-09-26 DIAGNOSIS — E66.01 CLASS 2 SEVERE OBESITY DUE TO EXCESS CALORIES WITH SERIOUS COMORBIDITY AND BODY MASS INDEX (BMI) OF 37.0 TO 37.9 IN ADULT: ICD-10-CM

## 2024-09-26 DIAGNOSIS — Z23 ENCOUNTER FOR IMMUNIZATION: ICD-10-CM

## 2024-09-26 DIAGNOSIS — E03.9 HYPOTHYROIDISM, UNSPECIFIED TYPE: ICD-10-CM

## 2024-09-26 DIAGNOSIS — I10 BENIGN ESSENTIAL HYPERTENSION: ICD-10-CM

## 2024-09-26 DIAGNOSIS — E11.65 TYPE 2 DIABETES MELLITUS WITH HYPERGLYCEMIA, WITH LONG-TERM CURRENT USE OF INSULIN: ICD-10-CM

## 2024-09-26 LAB — POC HEMOGLOBIN A1C: 11.8 % (ref 4.2–6.5)

## 2024-09-26 ASSESSMENT — ACTIVITIES OF DAILY LIVING (ADL)
MANAGING_FINANCES: INDEPENDENT
TAKING_MEDICATION: INDEPENDENT
GROCERY_SHOPPING: INDEPENDENT
DOING_HOUSEWORK: INDEPENDENT
BATHING: INDEPENDENT

## 2024-09-26 ASSESSMENT — ENCOUNTER SYMPTOMS
OCCASIONAL FEELINGS OF UNSTEADINESS: 0
DEPRESSION: 0
LOSS OF SENSATION IN FEET: 0

## 2024-09-26 NOTE — ASSESSMENT & PLAN NOTE
Orders:    POCT glycosylated hemoglobin (Hb A1C) manually resulted    SITagliptin phosphate (Januvia) 100 mg tablet; Take 1 tablet (100 mg) by mouth once daily.

## 2024-09-26 NOTE — PROGRESS NOTES
"Subjective   Reason for Visit: Earline Villagran is an 68 y.o. female here for a Medicare Wellness visit.     Past Medical, Surgical, and Family History reviewed and updated in chart.         Here for Medicare Wellness visit, and follow up of diabetes.    Glucose has been running in 200's.  Not eating any sweets.  Met with nutritionist, and it was helpful.  Off metformin, since messed up GI tract.  Jardiance made nauseous and shaky  Only taking Trulicity 4.5.  Had been on Januvia in the past, but stopped when ran out, since she was told it was the same thing as Trulicity when at the hospital.  No foot symptoms    Went to ED 9/12/24 with abdominal pain, treated for UTI.    Had gone to ED 5/19/24 for dizziness, and was kept in hospital overnight.  Summary copied in part below:  \"Pt presented to ED this morning for further evaluation of dizziness and chest pain that started this morning after she got out of the shower. [place] went to [facility] and dizziness and chest heaviness started again. Chest pain/heaviness was present on the right of her chest and it was associated with dyspnea. Pt stated, \"I got dizzy and felt like I was about to pass out and my chest started feeling heavy. I felt like I could not catch my breath.\" Pt denies prior episodes of chest pain outside of today.  Chest pain did not radiate. EMS was called and per report of EMS her VS were normal but her BGL was >500. Pt recently went to [place] where she \"over indulged.\" Pt ran out of her Trulicity and has recently started taking it again.  She also endorses fatigue, polyuria, polyphagia and polydipsia over the past few days. Troponin negative on admit. No acute findings seen on CXR. EKG showed NSR with no ischemic changes. Labs obtained on admit notable for impaired renal function, hyponatremia, and hyperglycemia. Decision made to admit pt to medicine service under observation for further treatment and management of chest pain and hyperglycemia. " "Echocardiogram on 5/19/24 showed normal EF 60% and and impaired relaxation of LV diastolic filling. Patient was started on low dose carvedilol with recommendation to follow up with her PCP for possible future cardiology referral if symptoms persist\"    No ongoing CP or dizziness.              Patient Care Team:  Patricia Calderon MD as PCP - General     Review of Systems    Objective   Vitals:  /72   Pulse 85   Temp 36.6 °C (97.9 °F)   Ht 1.676 m (5' 6\")   Wt 104 kg (229 lb 9.6 oz)   SpO2 96%   BMI 37.06 kg/m²       Physical Exam  Vitals reviewed.   Constitutional:       Appearance: Normal appearance.   Cardiovascular:      Rate and Rhythm: Normal rate and regular rhythm.      Heart sounds: No murmur heard.  Pulmonary:      Effort: Pulmonary effort is normal.      Breath sounds: Normal breath sounds.   Musculoskeletal:      Right lower leg: No edema.      Left lower leg: No edema.   Neurological:      Mental Status: She is alert.   Psychiatric:         Mood and Affect: Mood normal.         Behavior: Behavior normal.         Assessment & Plan  CKD stage 3 due to type 2 diabetes mellitus (Multi)    Orders:    POCT glycosylated hemoglobin (Hb A1C) manually resulted    SITagliptin phosphate (Januvia) 100 mg tablet; Take 1 tablet (100 mg) by mouth once daily.    Encounter for immunization    Orders:    Pfizer COVID-19 vaccine, monovalent, age 12 years and older (30 mcg/0.3 mL) (Comirnaty)    Type 2 diabetes mellitus with hyperglycemia, with long-term current use of insulin    Orders:    Referral to Endocrinology; Future    Follow Up In [facility] - Pharmacy; Future    Class 2 severe obesity due to excess calories with serious comorbidity and body mass index (BMI) of 37.0 to 37.9 in adult         Routine general medical examination at health care facility    Orders:    1 Year Follow Up In [facility] - PCP - Wellness Exam; Future    Hyperlipidemia, unspecified hyperlipidemia type     "     Benign essential hypertension         Hypothyroidism, unspecified type

## 2024-09-28 PROBLEM — B02.9 HERPES ZOSTER: Status: RESOLVED | Noted: 2024-05-19 | Resolved: 2024-09-28

## 2024-09-28 PROBLEM — R07.9 CHEST PAIN: Status: RESOLVED | Noted: 2024-05-19 | Resolved: 2024-09-28

## 2024-09-28 NOTE — PATIENT INSTRUCTIONS
Immunizations recommended:  --Flu shot every fall.  --Pneumonia vaccines have been done.  --Shingrix,to reduce risk of getting shingles.  It can be done at the pharmacy  --Tetanus every 10 years.  --Covid vaccine. -done today.  --RSV vaccine.    If your pap smears have been normal, you can do them every 3-5 years, and stop after the age of 65.    Colonoscopy should be done every 10 years after the age of 45, unless there was a previous abnormality, or family history of colon cancer.  Yours was done in 2020, and needs repeated in 2025, due to polyps.    Mammogram screening recommendations are changing, but at this time, I recommend a mammogram every 1-2 years in your 40's, and yearly after the age of 50.  Having a family history of breast cancer may change that.  Done 2/15/24    You should try to get 150 minutes of exercise weekly.  Be sure to eat a diet rich in fruits and vegetables, and low in saturated fats and sodium.  Strive for a healthy BMI of less than 25.     Make sure to wear sun screen when outside, and check for changing or unusual moles.    Pre-menopause recommendations are for 1000 mg calcium and 1000 units vitamin D3 daily.  After menopause calcium recommendation is 1200 mg, with 1000 units of vitamin D3  Bone density can be done every 2 years to assess bone loss. --ordered    I recommend you get a Living Will and Durable Power of  for Healthcare. These documents state what care you would want if you couldn't speak for yourself. They help your loved ones in caring for you if that happens.   Once you have those forms completed, you can keep a copy on file with your doctor.    Specialists being seen:  Ophthalmologist    A1C 11.8, indicating poor control of diabetes.  Continue Trulicity 4.5 mg.  Restart Januvia.  Refer to  pharmacy to help with medication.  Refer to endocrinologist    For BP/heart, taking amlodipine, carvedilol, hydrochlorothiazide, and 81 mg aspirin.    For cholesterol, taking  atorvastatin.    For thyroid, taking levothyroxine 125 mcg daily.  TSH done 3 months ago.    For GERD, taking omeprazole.    Follow up in office in 3 months.

## 2024-10-01 ENCOUNTER — OFFICE VISIT (OUTPATIENT)
Dept: ENDOCRINOLOGY | Facility: CLINIC | Age: 69
End: 2024-10-01
Payer: MEDICARE

## 2024-10-01 VITALS
HEIGHT: 66 IN | DIASTOLIC BLOOD PRESSURE: 78 MMHG | WEIGHT: 230 LBS | BODY MASS INDEX: 36.96 KG/M2 | SYSTOLIC BLOOD PRESSURE: 132 MMHG

## 2024-10-01 DIAGNOSIS — I10 BENIGN ESSENTIAL HYPERTENSION: ICD-10-CM

## 2024-10-01 DIAGNOSIS — Z79.4 TYPE 2 DIABETES MELLITUS WITH HYPERGLYCEMIA, WITH LONG-TERM CURRENT USE OF INSULIN: Primary | ICD-10-CM

## 2024-10-01 DIAGNOSIS — E78.5 HYPERLIPIDEMIA, UNSPECIFIED HYPERLIPIDEMIA TYPE: ICD-10-CM

## 2024-10-01 DIAGNOSIS — E03.9 HYPOTHYROIDISM, UNSPECIFIED TYPE: ICD-10-CM

## 2024-10-01 DIAGNOSIS — E11.65 TYPE 2 DIABETES MELLITUS WITH HYPERGLYCEMIA, WITH LONG-TERM CURRENT USE OF INSULIN: Primary | ICD-10-CM

## 2024-10-01 PROCEDURE — 1123F ACP DISCUSS/DSCN MKR DOCD: CPT | Performed by: INTERNAL MEDICINE

## 2024-10-01 PROCEDURE — 3078F DIAST BP <80 MM HG: CPT | Performed by: INTERNAL MEDICINE

## 2024-10-01 PROCEDURE — 3046F HEMOGLOBIN A1C LEVEL >9.0%: CPT | Performed by: INTERNAL MEDICINE

## 2024-10-01 PROCEDURE — 3075F SYST BP GE 130 - 139MM HG: CPT | Performed by: INTERNAL MEDICINE

## 2024-10-01 PROCEDURE — 3008F BODY MASS INDEX DOCD: CPT | Performed by: INTERNAL MEDICINE

## 2024-10-01 PROCEDURE — 1159F MED LIST DOCD IN RCRD: CPT | Performed by: INTERNAL MEDICINE

## 2024-10-01 PROCEDURE — 99205 OFFICE O/P NEW HI 60 MIN: CPT | Performed by: INTERNAL MEDICINE

## 2024-10-01 RX ORDER — TIRZEPATIDE 10 MG/.5ML
10 INJECTION, SOLUTION SUBCUTANEOUS
Qty: 2 ML | Refills: 0 | Status: SHIPPED | OUTPATIENT
Start: 2024-10-01

## 2024-10-01 RX ORDER — GLIMEPIRIDE 4 MG/1
4 TABLET ORAL
Qty: 60 TABLET | Refills: 11 | Status: SHIPPED | OUTPATIENT
Start: 2024-10-01 | End: 2025-10-01

## 2024-10-01 NOTE — PROGRESS NOTES
Physical Therapy    Physical Therapy    Physical Therapy Evaluation and Treatment      Patient Name: Earline Villagran  MRN: [id number]  Today's Date: 10/2/24  Visit 2    Time Entry:   Time Calculation  Start Time: 1430  Stop Time: 1505  Time Calculation (min): 35 min     PT Therapeutic Procedures Time Entry  Therapeutic Exercise Time Entry: 35                   Assessment:  Earline Villagran was able to tolerate LE resistive exercise program to muscular fatigue with out aggravating familiar knee pain  Plan:   Continue POC LE and core  strengthening to manage knee pain and improve basic function        Problem List Items Addressed This Visit             ICD-10-CM    Acute pain of right shoulder M25.511    Pain in both knees - Primary M25.561, M25.562      Subjective    Earline Villagran is here after work, Knee pain not as as bad as initial visit  Pain:    Bilat knee anterior knee 6/10  Objective:  Antalgic gait bilat LE frontal plane waddle due to knee pain  Treatments:  There ex:  Sci fit recumbent stepper lv 2 45 RPM 6 min  Kickstand stance 10 sec 3 x each leg  Sit to stand at rail 3 x 5 Airex mat on seat to elevate  Seated hip abduction blue 2 x 10 5 sec holds  EDUCATION:   Sit to stand HEP  Access Code: [email or web address]  URL: [email or web address]  Date: 10/02/2024  Prepared by: Juanito Barboza    Exercises  - Seated Hip Abduction with Resistance  - 1 x daily - 7 x weekly - 2 sets - 10 reps - 5 hold  - Sit to Stand with Arm Reach Toward Target  - 1 x daily - 7 x weekly - 3 sets - 5 reps    Goals:  1. Decrease pain to 4/10 severity level   2. Increase  L knee flexion AROM to   110 deg   3. Increase bilat hip and nee strength to   4/5 MMT grade  4. Improve ambulation ability to least restrictive device/ no device on level surface and steps at least 300   feet community distances  5. Increase L hip flexion to 110 deg without pain  6. Improve out come score by 15 points  7. independent Home exercise  program

## 2024-10-02 ENCOUNTER — TREATMENT (OUTPATIENT)
Dept: PHYSICAL THERAPY | Facility: CLINIC | Age: 69
End: 2024-10-02
Payer: MEDICARE

## 2024-10-02 DIAGNOSIS — M25.562 PAIN IN BOTH KNEES, UNSPECIFIED CHRONICITY: Primary | ICD-10-CM

## 2024-10-02 DIAGNOSIS — M25.511 ACUTE PAIN OF RIGHT SHOULDER: ICD-10-CM

## 2024-10-02 DIAGNOSIS — M25.561 PAIN IN BOTH KNEES, UNSPECIFIED CHRONICITY: Primary | ICD-10-CM

## 2024-10-02 PROCEDURE — 97110 THERAPEUTIC EXERCISES: CPT | Mod: GP | Performed by: PHYSICAL THERAPIST

## 2024-10-03 ENCOUNTER — TELEPHONE (OUTPATIENT)
Dept: PRIMARY CARE | Facility: CLINIC | Age: 69
End: 2024-10-03
Payer: MEDICARE

## 2024-10-09 ENCOUNTER — TREATMENT (OUTPATIENT)
Dept: PHYSICAL THERAPY | Facility: CLINIC | Age: 69
End: 2024-10-09
Payer: MEDICARE

## 2024-10-09 DIAGNOSIS — M25.511 ACUTE PAIN OF RIGHT SHOULDER: ICD-10-CM

## 2024-10-09 DIAGNOSIS — M25.562 PAIN IN BOTH KNEES, UNSPECIFIED CHRONICITY: Primary | ICD-10-CM

## 2024-10-09 DIAGNOSIS — M25.561 PAIN IN BOTH KNEES, UNSPECIFIED CHRONICITY: Primary | ICD-10-CM

## 2024-10-09 PROCEDURE — 97110 THERAPEUTIC EXERCISES: CPT | Mod: GP | Performed by: PHYSICAL THERAPIST

## 2024-10-09 NOTE — PROGRESS NOTES
Physical Therapy    Physical Therapy    Physical Therapy Evaluation and Treatment      Patient Name: Earline Villagran  MRN: [id number]  Today's Date: 10/9/24  Visit 3    Time Entry:   Time Calculation  Start Time: 1430  Stop Time: 1510  Time Calculation (min): 40 min     PT Therapeutic Procedures Time Entry  Therapeutic Exercise Time Entry: 40                   Assessment:  Earline Villagran was able to tolerate increase in exercise volume today with muscular fatigue , but not increased knee pain  Plan:   Continue POC LE and core  strengthening to manage knee pain and improve basic function        Problem List Items Addressed This Visit             ICD-10-CM    Acute pain of right shoulder M25.511    Pain in both knees - Primary M25.561, M25.562      Subjective    Earline Villagran is here after work, Exercises seem to help , less night pain. Earline Villagran notes that she can tolerate standing longer  Pain:    Bilat knee anterior knee 6/10  Objective:    Treatments:  There ex:  Sci fit recumbent stepper lv 2 45 RPM 8 min  Kickstand stance 10 sec 3 x each leg  Sit to stand at rail 3 x 6 Airex mat on seat to elevate  Seated hip abduction blue 2 x 10 5 sec holds  Seated hip adduction isometric 2x5 5 sec  LAQ 4 lbs 1x10 each  EDUCATION:      Goals:  1. Decrease pain to 4/10 severity level   2. Increase  L knee flexion AROM to   110 deg   3. Increase bilat hip and nee strength to   4/5 MMT grade  4. Improve ambulation ability to least restrictive device/ no device on level surface and steps at least 300   feet community distances  5. Increase L hip flexion to 110 deg without pain  6. Improve out come score by 15 points  7. independent Home exercise program

## 2024-10-10 DIAGNOSIS — E11.22 CKD STAGE 3 DUE TO TYPE 2 DIABETES MELLITUS (MULTI): ICD-10-CM

## 2024-10-10 DIAGNOSIS — N18.30 CKD STAGE 3 DUE TO TYPE 2 DIABETES MELLITUS (MULTI): ICD-10-CM

## 2024-10-10 RX ORDER — BLOOD SUGAR DIAGNOSTIC
STRIP MISCELLANEOUS
Qty: 300 STRIP | Refills: 3 | Status: SHIPPED | OUTPATIENT
Start: 2024-10-10

## 2024-10-13 DIAGNOSIS — R09.81 SINUS CONGESTION: ICD-10-CM

## 2024-10-13 RX ORDER — FLUTICASONE PROPIONATE 50 MCG
2 SPRAY, SUSPENSION (ML) NASAL DAILY
Qty: 16 G | Refills: 1 | Status: SHIPPED | OUTPATIENT
Start: 2024-10-13

## 2024-10-15 ENCOUNTER — APPOINTMENT (OUTPATIENT)
Dept: PHARMACY | Facility: HOSPITAL | Age: 69
End: 2024-10-15
Payer: MEDICARE

## 2024-10-15 DIAGNOSIS — Z79.4 TYPE 2 DIABETES MELLITUS WITH HYPERGLYCEMIA, WITH LONG-TERM CURRENT USE OF INSULIN: ICD-10-CM

## 2024-10-15 DIAGNOSIS — E11.65 TYPE 2 DIABETES MELLITUS WITH HYPERGLYCEMIA, WITH LONG-TERM CURRENT USE OF INSULIN: ICD-10-CM

## 2024-10-15 RX ORDER — HYDROXYZINE HYDROCHLORIDE 10 MG/1
10 TABLET, FILM COATED ORAL EVERY 8 HOURS PRN
COMMUNITY

## 2024-10-15 RX ORDER — TIRZEPATIDE 12.5 MG/.5ML
12.5 INJECTION, SOLUTION SUBCUTANEOUS WEEKLY
Qty: 2 ML | Refills: 0 | Status: SHIPPED | OUTPATIENT
Start: 2024-10-15

## 2024-10-15 RX ORDER — GABAPENTIN 100 MG/1
200 CAPSULE ORAL NIGHTLY
COMMUNITY
End: 2024-10-16 | Stop reason: SDUPTHER

## 2024-10-15 NOTE — ASSESSMENT & PLAN NOTE
Patient's goal A1c is < 7%.  Is pt at goal? No, patient's most recent A1c from 9/26/2024 was 11.8%  Patient's SMBGs are mostly at goal now, she did have a few elevated blood sugars after eating some sweets/cake, but mostly she reports her BG is in the 130s.     Rationale for plan: Patient at this time is interested in increasing the dose of Mounjaro to 12.5 mg weekly.    Medication Changes:  CONTINUE:  Glimepiride 4 mg; take 1 tablet BID  INCREASE  Mounjaro 12.5 mg; inject 12.5 mg once weekly on Saturdays    Future Considerations:  Based on future BG readings and updated A1c, can increase Mounjaro to 15 mg if patient tolerating     Monitoring and Education:  Counseled patient on dosing, MOA, adverse effects of medications  Counseled patient on goals for BG including FBG <130 mg/dL; PPBG <180 mg/dL; and A1c <7%.  Answered all patient questions/concerns

## 2024-10-15 NOTE — PROGRESS NOTES
Clinical Pharmacy Appointment    Patient ID: Earline Villagran is a 68 y.o. female who presents for Diabetes.    Pt is here for First appointment.     Referring Provider: Patricia Calderon MD  PCP: Patricia Calderon MD   Last visit with PCP: 9/26/2024   Next visit with PCP: not scheduled      Subjective   HPI  DIABETES MELLITUS TYPE 2:    Diagnosed with diabetes:  20 years ago. Known diabetic complications: hyperglycemia.  Does patient follow with Endocrinology: Yes     Current diabetic medications include:  Mounjaro 10 mg; inject 10 mg once weekly on Saturday  Glimepiride 4 mg; take 1 tablet BID     Clarifications to above regimen: None  Adverse Effects: None    Past diabetic medications include:  Trulicity, Januvia, Glipizide, Metformin    Glucose Readings:  Glucometer/CGM Type: OneTouch Ultra Glucometer  Patient tests BG 2 times per day    Current home BG readings: 206 mg/dL is the highest she has seen recently (130's typically throughout the day)     Any episodes of hypoglycemia? No, no hypoglycemic episodes recently .  Did patient treat episode of hypoglycemia appropriately? N/A  Does the patient have a prescription for ready-to-use Glucagon? Not on insulin  Does pt have proteinuria? No    Lifestyle:  Diet: 2 meals/day. Patient   BK: Cereal, banana  LN: Salad, meat, shrimp  Snacks: Patient does not snack.  Drinks: Water, sparkling water  Physical Activity: Walking, patient is trying to work out more.     Secondary Prevention:  Statin? Yes  ACE-I/ARB? No  Aspirin? Yes    Pertinent PMH Review:  PMH of Pancreatitis: No  PMH of Retinopathy: No  PMH of Urinary Tract Infections: No  PMH of MTC: No    Drug Interactions  No relevant drug interactions were noted.    Medication System Management  Patient's preferred pharmacy: [id number] in [place]  Adherence/Organization: No concerns at this time  Affordability/Accessibility: No concerns at this time      Objective   Allergies   Allergen Reactions     Raspberry Shortness of breath    Shrimp Anaphylaxis    Ciprofloxacin Itching    Clindamycin Diarrhea    Green Tea Swelling    Penicillins Syncope    Sulfamethoxazole-Trimethoprim Hives and Unknown    Phenylephrine-Guaifenesin Palpitations     Social History     Social History Narrative    Not on file      Medication Review  Current Outpatient Medications   Medication Instructions    acetaminophen (TYLENOL) 975 mg, oral, Every 6 hours PRN    amLODIPine (NORVASC) 5 mg, oral, Daily    aspirin 81 mg EC tablet 1 tablet, oral, Daily    atorvastatin (LIPITOR) 40 mg, oral, Daily    blood sugar diagnostic (OneTouch Ultra Test) strip TEST GLUCOSE 2 TO 3 TIMES DAILY    carvedilol (Coreg) 3.125 mg tablet TAKE 1 TABLET BY MOUTH TWICE  DAILY (MORNING AND LATE  AFTERNOON    cyclobenzaprine (FLEXERIL) 10 mg, oral, 3 times daily PRN    fluticasone (Flonase) 50 mcg/actuation nasal spray 2 sprays, Each Nostril, Daily    gabapentin (NEURONTIN) 200 mg, oral, Nightly    glimepiride (AMARYL) 4 mg, oral, 2 times daily Nitrate    hydroCHLOROthiazide (HYDRODIURIL) 25 mg, oral, Daily    hydrOXYzine HCL (ATARAX) 10 mg, oral, Every 8 hours PRN    levothyroxine (SYNTHROID, LEVOXYL) 125 mcg, oral, Daily    loratadine 10 mg, oral, Daily    omeprazole (PRILOSEC) 20 mg, oral, Daily      Vitals  BP Readings from Last 2 Encounters:   10/01/24 132/78   09/26/24 140/72     BMI Readings from Last 1 Encounters:   10/01/24 37.12 kg/m²      Labs  A1C  Lab Results   Component Value Date    HGBA1C 11.8 (A) 09/26/2024    HGBA1C 10.6 (H) 05/19/2024    HGBA1C 8.8 (H) 12/29/2023     BMP  Lab Results   Component Value Date    CALCIUM 8.9 09/12/2024     09/12/2024    K 3.9 09/12/2024    CO2 31 09/12/2024    CL 97 (L) 09/12/2024    BUN 15 09/12/2024    CREATININE 1.11 (H) 09/12/2024    EGFR 54 (L) 09/12/2024     LFTs  Lab Results   Component Value Date    ALT 17 09/12/2024    AST 15 09/12/2024    ALKPHOS 72 09/12/2024    BILITOT 0.4 09/12/2024     [facility]  Lab  Results   Component Value Date    TRIG 161 (H) 12/29/2023    CHOL 222 (H) 12/29/2023    LDLF 198 (H) 08/09/2023    LDLCALC 140 (H) 12/29/2023    HDL 50.3 12/29/2023     Urine Microalbumin  Lab Results   Component Value Date    MICROALBCREA 17.5 06/04/2022     Weight Management  Wt Readings from Last 3 Encounters:   10/01/24 104 kg (230 lb)   09/26/24 104 kg (229 lb 9.6 oz)   09/12/24 104 kg (230 lb)      There is no height or weight on file to calculate BMI.     Assessment/Plan   Problem List Items Addressed This Visit       Type 2 diabetes mellitus with hyperglycemia, with long-term current use of insulin     Patient's goal A1c is < 7%.  Is pt at goal? No, patient's most recent A1c from 9/26/2024 was 11.8%  Patient's SMBGs are mostly at goal now, she did have a few elevated blood sugars after eating some sweets/cake, but mostly she reports her BG is in the 130s.     Rationale for plan: Patient at this time is interested in increasing the dose of Mounjaro to 12.5 mg weekly.    Medication Changes:  CONTINUE:  Glimepiride 4 mg; take 1 tablet BID  INCREASE  Mounjaro 12.5 mg; inject 12.5 mg once weekly on Saturdays    Future Considerations:  Based on future BG readings and updated A1c, can increase Mounjaro to 15 mg if patient tolerating     Monitoring and Education:  Counseled patient on dosing, MOA, adverse effects of medications  Counseled patient on goals for BG including FBG <130 mg/dL; PPBG <180 mg/dL; and A1c <7%.  Answered all patient questions/concerns            Clinical Pharmacist follow-up: 11/12/2024, Telehealth visit    Continue all meds under the continuation of care with the referring provider and clinical pharmacy team.    Thank you,  Theresa Castañeda, PharmD  Clinical Pharmacist - Primary Care  [phone]  10/15/2024    Verbal consent to manage patient's drug therapy was obtained from the patient. They were informed they may decline to participate or withdraw from participation in pharmacy services at  any time.

## 2024-10-16 DIAGNOSIS — M25.562 CHRONIC PAIN OF BOTH KNEES: Primary | ICD-10-CM

## 2024-10-16 DIAGNOSIS — L29.9 ITCH: ICD-10-CM

## 2024-10-16 DIAGNOSIS — M25.561 CHRONIC PAIN OF BOTH KNEES: Primary | ICD-10-CM

## 2024-10-16 DIAGNOSIS — G89.29 CHRONIC PAIN OF BOTH KNEES: Primary | ICD-10-CM

## 2024-10-16 RX ORDER — HYDROXYZINE HYDROCHLORIDE 10 MG/1
10 TABLET, FILM COATED ORAL EVERY 8 HOURS PRN
Qty: 90 TABLET | Refills: 0 | Status: SHIPPED | OUTPATIENT
Start: 2024-10-16

## 2024-10-16 RX ORDER — GABAPENTIN 100 MG/1
100 CAPSULE ORAL 3 TIMES DAILY
Qty: 270 CAPSULE | Refills: 0 | Status: SHIPPED | OUTPATIENT
Start: 2024-10-16 | End: 2025-01-14

## 2024-10-18 ENCOUNTER — APPOINTMENT (OUTPATIENT)
Dept: PHYSICAL THERAPY | Facility: CLINIC | Age: 69
End: 2024-10-18
Payer: MEDICARE

## 2024-10-22 ENCOUNTER — APPOINTMENT (OUTPATIENT)
Dept: PHYSICAL THERAPY | Facility: CLINIC | Age: 69
End: 2024-10-22
Payer: MEDICARE

## 2024-10-22 DIAGNOSIS — M25.562 PAIN IN BOTH KNEES, UNSPECIFIED CHRONICITY: Primary | ICD-10-CM

## 2024-10-22 DIAGNOSIS — M25.561 PAIN IN BOTH KNEES, UNSPECIFIED CHRONICITY: Primary | ICD-10-CM

## 2024-10-22 DIAGNOSIS — M25.511 ACUTE PAIN OF RIGHT SHOULDER: ICD-10-CM

## 2024-10-28 DIAGNOSIS — E11.65 TYPE 2 DIABETES MELLITUS WITH HYPERGLYCEMIA, WITH LONG-TERM CURRENT USE OF INSULIN: ICD-10-CM

## 2024-10-28 DIAGNOSIS — Z79.4 TYPE 2 DIABETES MELLITUS WITH HYPERGLYCEMIA, WITH LONG-TERM CURRENT USE OF INSULIN: ICD-10-CM

## 2024-10-28 RX ORDER — GLIMEPIRIDE 4 MG/1
4 TABLET ORAL
Qty: 60 TABLET | Refills: 3 | Status: SHIPPED | OUTPATIENT
Start: 2024-10-28 | End: 2024-10-30 | Stop reason: SDUPTHER

## 2024-10-28 RX ORDER — TIRZEPATIDE 12.5 MG/.5ML
12.5 INJECTION, SOLUTION SUBCUTANEOUS WEEKLY
Qty: 6 ML | Refills: 0 | Status: SHIPPED | OUTPATIENT
Start: 2024-10-28

## 2024-10-30 DIAGNOSIS — Z79.4 TYPE 2 DIABETES MELLITUS WITH HYPERGLYCEMIA, WITH LONG-TERM CURRENT USE OF INSULIN: ICD-10-CM

## 2024-10-30 DIAGNOSIS — N18.30 CKD STAGE 3 DUE TO TYPE 2 DIABETES MELLITUS (MULTI): ICD-10-CM

## 2024-10-30 DIAGNOSIS — K21.9 GASTROESOPHAGEAL REFLUX DISEASE WITHOUT ESOPHAGITIS: Primary | ICD-10-CM

## 2024-10-30 DIAGNOSIS — E11.65 TYPE 2 DIABETES MELLITUS WITH HYPERGLYCEMIA, WITH LONG-TERM CURRENT USE OF INSULIN: ICD-10-CM

## 2024-10-30 DIAGNOSIS — E11.22 CKD STAGE 3 DUE TO TYPE 2 DIABETES MELLITUS (MULTI): ICD-10-CM

## 2024-10-30 DIAGNOSIS — I10 BENIGN ESSENTIAL HYPERTENSION: ICD-10-CM

## 2024-10-30 DIAGNOSIS — I50.30 DIASTOLIC CONGESTIVE HEART FAILURE, UNSPECIFIED HF CHRONICITY: ICD-10-CM

## 2024-10-30 DIAGNOSIS — E03.9 HYPOTHYROIDISM, UNSPECIFIED TYPE: ICD-10-CM

## 2024-10-30 RX ORDER — HYDROCHLOROTHIAZIDE 25 MG/1
25 TABLET ORAL DAILY
Qty: 90 TABLET | Refills: 1 | Status: SHIPPED | OUTPATIENT
Start: 2024-10-30

## 2024-10-30 RX ORDER — GLIMEPIRIDE 4 MG/1
4 TABLET ORAL
Qty: 180 TABLET | Refills: 1 | Status: SHIPPED | OUTPATIENT
Start: 2024-10-30 | End: 2025-04-28

## 2024-10-30 RX ORDER — LEVOTHYROXINE SODIUM 125 UG/1
125 TABLET ORAL DAILY
Qty: 90 TABLET | Refills: 0 | Status: SHIPPED | OUTPATIENT
Start: 2024-10-30

## 2024-10-30 RX ORDER — CARVEDILOL 3.12 MG/1
3.12 TABLET ORAL 2 TIMES DAILY
Qty: 180 TABLET | Refills: 1 | Status: SHIPPED | OUTPATIENT
Start: 2024-10-30

## 2024-10-30 RX ORDER — AMLODIPINE BESYLATE 5 MG/1
5 TABLET ORAL DAILY
Qty: 90 TABLET | Refills: 1 | Status: SHIPPED | OUTPATIENT
Start: 2024-10-30

## 2024-10-30 RX ORDER — ATORVASTATIN CALCIUM 40 MG/1
40 TABLET, FILM COATED ORAL DAILY
Qty: 90 TABLET | Refills: 2 | Status: SHIPPED | OUTPATIENT
Start: 2024-10-30

## 2024-10-30 RX ORDER — PHENOL/SODIUM PHENOLATE
20 AEROSOL, SPRAY (ML) MUCOUS MEMBRANE DAILY
Qty: 90 TABLET | Refills: 1 | Status: SHIPPED | OUTPATIENT
Start: 2024-10-30

## 2024-11-01 ENCOUNTER — TREATMENT (OUTPATIENT)
Dept: PHYSICAL THERAPY | Facility: CLINIC | Age: 69
End: 2024-11-01
Payer: MEDICARE

## 2024-11-01 DIAGNOSIS — M25.562 PAIN IN BOTH KNEES, UNSPECIFIED CHRONICITY: Primary | ICD-10-CM

## 2024-11-01 DIAGNOSIS — M25.561 PAIN IN BOTH KNEES, UNSPECIFIED CHRONICITY: Primary | ICD-10-CM

## 2024-11-08 DIAGNOSIS — R09.81 SINUS CONGESTION: ICD-10-CM

## 2024-11-08 RX ORDER — FLUTICASONE PROPIONATE 50 MCG
2 SPRAY, SUSPENSION (ML) NASAL DAILY
Qty: 48 ML | Refills: 1 | Status: SHIPPED | OUTPATIENT
Start: 2024-11-08

## 2024-11-10 DIAGNOSIS — R39.9 UTI SYMPTOMS: Primary | ICD-10-CM

## 2024-11-12 ENCOUNTER — APPOINTMENT (OUTPATIENT)
Dept: PHARMACY | Facility: HOSPITAL | Age: 69
End: 2024-11-12
Payer: MEDICARE

## 2024-11-12 DIAGNOSIS — Z79.4 TYPE 2 DIABETES MELLITUS WITH HYPERGLYCEMIA, WITH LONG-TERM CURRENT USE OF INSULIN: ICD-10-CM

## 2024-11-12 DIAGNOSIS — E11.65 TYPE 2 DIABETES MELLITUS WITH HYPERGLYCEMIA, WITH LONG-TERM CURRENT USE OF INSULIN: ICD-10-CM

## 2024-11-12 NOTE — PROGRESS NOTES
Clinical Pharmacy Appointment    Patient ID: Earline Villagran is a 69 y.o. female who presents for Diabetes.    Pt is here for Follow Up appointment.     Referring Provider: Patricia Calderon MD  PCP: Patricia Calderon MD   Last visit with PCP: 9/26/2024   Next visit with PCP: not scheduled      Subjective   HPI  DIABETES MELLITUS TYPE 2:    Diagnosed with diabetes: 20 yeas ago. Known diabetic complications: hyperglycemia.  Does patient follow with Endocrinology: Yes    Current diabetic medications include:  Mounjaro 12.5 mg; inject 12.5 mg once weekly on Saturdays  Glimepiride 4 mg; take 1 tablet BID    Past diabetic medications include:  Trulicity, Januvia, Glipizide, Metformin    Glucose Readings:  Glucometer/CGM Type: OneTouch Ultra Glucometer  Patient tests BG 2 times per day    Current home BG readings: 93 mg/dL; 187 mg/dL; 113 mg/dL; 114 mg/dL; 181 mg/dL; 175 mg/dL; 63 mg/dL; 102 mg/dL; 97 mg/dL; 101 mg/dL (Ave: 122 mg/dL)  Previous home BG readings: Typically in the 130s throughout the day but 206 mg/dL is the highest she has seen recently    Any episodes of hypoglycemia? Yes, patient did have 1 episode where her FBG fell to 63 mg/dL .  Did patient treat episode of hypoglycemia appropriately? Yes, patient had some food and it emma appropriately  Does the patient have a prescription for ready-to-use Glucagon? Not on insulin  Does pt have proteinuria? No    Lifestyle:  Diet: 2 meals/day.  BK: Cereal, banana  LN: Salad, meat, shrimp  Drinks: Water, sparkling water  Physical Activity: Walking, patient is trying to work out more.    Secondary Prevention:  Statin? Yes  ACE-I/ARB? No  Aspirin? Yes    Pertinent PMH Review:  PMH of Pancreatitis: No  PMH of Retinopathy: No  PMH of Urinary Tract Infections: No  PMH of MTC: No    Drug Interactions  No relevant drug interactions were noted.    Medication System Management  Patient's preferred pharmacy: [id number] in [place]  Adherence/Organization: No  concerns at this time  Affordability/Accessibility: No concerns at this time      Objective   Allergies   Allergen Reactions    Raspberry Shortness of breath    Shrimp Anaphylaxis    Ciprofloxacin Itching    Clindamycin Diarrhea    Green Tea Swelling    Penicillins Syncope    Sulfamethoxazole-Trimethoprim Hives and Unknown    Phenylephrine-Guaifenesin Palpitations     Social History     Social History Narrative    Not on file      Medication Review  Current Outpatient Medications   Medication Instructions    acetaminophen (TYLENOL) 975 mg, oral, Every 6 hours PRN    amLODIPine (NORVASC) 5 mg, oral, Daily    aspirin 81 mg EC tablet 1 tablet, oral, Daily    atorvastatin (LIPITOR) 40 mg, oral, Daily    blood sugar diagnostic (OneTouch Ultra Test) strip TEST GLUCOSE 2 TO 3 TIMES DAILY    carvedilol (COREG) 3.125 mg, oral, 2 times daily    cyclobenzaprine (FLEXERIL) 10 mg, oral, 3 times daily PRN    fluticasone (Flonase) 50 mcg/actuation nasal spray 2 sprays, Each Nostril, Daily    gabapentin (NEURONTIN) 100 mg, oral, 3 times daily    glimepiride (AMARYL) 4 mg, oral, 2 times daily Nitrate    hydroCHLOROthiazide (HYDRODIURIL) 25 mg, oral, Daily    hydrOXYzine HCL (ATARAX) 10 mg, oral, Every 8 hours PRN    hydrOXYzine HCL (ATARAX) 10 mg, oral, Every 8 hours PRN    levothyroxine (SYNTHROID, LEVOXYL) 125 mcg, oral, Daily    loratadine 10 mg, oral, Daily    Mounjaro 12.5 mg, subcutaneous, Weekly    omeprazole (PRILOSEC) 20 mg, oral, Daily    sharps container 1 each, miscellaneous, As needed      Vitals  BP Readings from Last 2 Encounters:   10/01/24 132/78   09/26/24 140/72     BMI Readings from Last 1 Encounters:   10/01/24 37.12 kg/m²      Labs  A1C  Lab Results   Component Value Date    HGBA1C 11.8 (A) 09/26/2024    HGBA1C 10.6 (H) 05/19/2024    HGBA1C 8.8 (H) 12/29/2023     BMP  Lab Results   Component Value Date    CALCIUM 8.9 09/12/2024     09/12/2024    K 3.9 09/12/2024    CO2 31 09/12/2024    CL 97 (L)  09/12/2024    BUN 15 09/12/2024    CREATININE 1.11 (H) 09/12/2024    EGFR 54 (L) 09/12/2024     LFTs  Lab Results   Component Value Date    ALT 17 09/12/2024    AST 15 09/12/2024    ALKPHOS 72 09/12/2024    BILITOT 0.4 09/12/2024     FLP  Lab Results   Component Value Date    TRIG 161 (H) 12/29/2023    CHOL 222 (H) 12/29/2023    LDLF 198 (H) 08/09/2023    LDLCALC 140 (H) 12/29/2023    HDL 50.3 12/29/2023     Urine Microalbumin  Lab Results   Component Value Date    MICROALBCREA 17.5 06/04/2022     Weight Management  Wt Readings from Last 3 Encounters:   10/01/24 104 kg (230 lb)   09/26/24 104 kg (229 lb 9.6 oz)   09/12/24 104 kg (230 lb)      There is no height or weight on file to calculate BMI.     Assessment/Plan   Problem List Items Addressed This Visit       Type 2 diabetes mellitus with hyperglycemia, with long-term current use of insulin     Patient's goal A1c is < 7%.  Is pt at goal? No, patient  Patient's SMBGs are mostly at goal, there were some elevated BG readings, average was 122 mg/dL.     Rationale for plan: Patient has recently been dealing with a UTI and her BG has been a little less controlled than it previously was. Patient states that normally it only gets really high if she eats something she shouldn't eat, but it is normally in a good range.    Medication Changes:  CONTINUE:  Mounjaro 12.5 mg; inject 12.5 mg once weekly on Saturdays  Glimepiride 4 mg; take 1 tablet BID    Future Considerations:  Based on updated A1c and BG readings, can increase Mounjaro to 15 mg weekly if needed    Monitoring and Education:  Counseled patient on dosing, MOA, adverse effects of medications  Counseled patient on goals for BG such as FBG <130 mg/dL; PPBG <180 mg/dL; A1c <7%  Answered all patient questions/concerns            Clinical Pharmacist follow-up: 12/17/2024, Telehealth visit    Continue all meds under the continuation of care with the referring provider and clinical pharmacy team.    Thank you,  Theresa  Roel Castañeda  Clinical Pharmacist - Primary [facility] [place]  [phone]  11/12/2024    Verbal consent to manage patient's drug therapy was obtained from the patient. They were informed they may decline to participate or withdraw from participation in pharmacy services at any time.

## 2024-11-12 NOTE — ASSESSMENT & PLAN NOTE
Patient's goal A1c is < 7%.  Is pt at goal? No, patient  Patient's SMBGs are mostly at goal, there were some elevated BG readings, average was 122 mg/dL.     Rationale for plan: Patient has recently been dealing with a UTI and her BG has been a little less controlled than it previously was. Patient states that normally it only gets really high if she eats something she shouldn't eat, but it is normally in a good range.    Medication Changes:  CONTINUE:  Mounjaro 12.5 mg; inject 12.5 mg once weekly on Saturdays  Glimepiride 4 mg; take 1 tablet BID    Future Considerations:  Based on updated A1c and BG readings, can increase Mounjaro to 15 mg weekly if needed    Monitoring and Education:  Counseled patient on dosing, MOA, adverse effects of medications  Counseled patient on goals for BG such as FBG <130 mg/dL; PPBG <180 mg/dL; A1c <7%  Answered all patient questions/concerns

## 2024-11-13 ENCOUNTER — LAB (OUTPATIENT)
Dept: LAB | Facility: LAB | Age: 69
End: 2024-11-13
Payer: MEDICARE

## 2024-11-13 DIAGNOSIS — R39.9 UTI SYMPTOMS: ICD-10-CM

## 2024-11-14 DIAGNOSIS — G89.29 CHRONIC NONINTRACTABLE HEADACHE, UNSPECIFIED HEADACHE TYPE: Primary | ICD-10-CM

## 2024-11-14 DIAGNOSIS — R51.9 CHRONIC NONINTRACTABLE HEADACHE, UNSPECIFIED HEADACHE TYPE: Primary | ICD-10-CM

## 2024-11-14 PROCEDURE — 81001 URINALYSIS AUTO W/SCOPE: CPT

## 2024-11-14 PROCEDURE — 87086 URINE CULTURE/COLONY COUNT: CPT

## 2024-11-15 LAB
AMORPH CRY #/AREA UR COMP ASSIST: ABNORMAL /HPF
APPEARANCE UR: CLEAR
BILIRUB UR STRIP.AUTO-MCNC: NEGATIVE MG/DL
COLOR UR: ABNORMAL
GLUCOSE UR STRIP.AUTO-MCNC: NORMAL MG/DL
HOLD SPECIMEN: NORMAL
HYALINE CASTS #/AREA URNS AUTO: ABNORMAL /LPF
KETONES UR STRIP.AUTO-MCNC: NEGATIVE MG/DL
LEUKOCYTE ESTERASE UR QL STRIP.AUTO: ABNORMAL
MUCOUS THREADS #/AREA URNS AUTO: ABNORMAL /LPF
NITRITE UR QL STRIP.AUTO: NEGATIVE
PH UR STRIP.AUTO: 5 [PH]
PROT UR STRIP.AUTO-MCNC: ABNORMAL MG/DL
RBC # UR STRIP.AUTO: NEGATIVE /UL
RBC #/AREA URNS AUTO: ABNORMAL /HPF
SP GR UR STRIP.AUTO: 1.02
SQUAMOUS #/AREA URNS AUTO: ABNORMAL /HPF
UROBILINOGEN UR STRIP.AUTO-MCNC: NORMAL MG/DL
WBC #/AREA URNS AUTO: ABNORMAL /HPF
YEAST BUDDING #/AREA UR COMP ASSIST: PRESENT /HPF

## 2024-11-16 LAB — BACTERIA UR CULT: NORMAL

## 2024-11-17 DIAGNOSIS — B37.9 YEAST INFECTION: Primary | ICD-10-CM

## 2024-11-17 RX ORDER — FLUCONAZOLE 150 MG/1
150 TABLET ORAL ONCE
Qty: 1 TABLET | Refills: 0 | Status: SHIPPED | OUTPATIENT
Start: 2024-11-17 | End: 2024-11-17

## 2024-11-20 ENCOUNTER — HOSPITAL ENCOUNTER (EMERGENCY)
Facility: HOSPITAL | Age: 69
Discharge: HOME | End: 2024-11-20
Attending: STUDENT IN AN ORGANIZED HEALTH CARE EDUCATION/TRAINING PROGRAM
Payer: MEDICARE

## 2024-11-20 ENCOUNTER — APPOINTMENT (OUTPATIENT)
Dept: RADIOLOGY | Facility: HOSPITAL | Age: 69
End: 2024-11-20
Payer: MEDICARE

## 2024-11-20 VITALS
WEIGHT: 230 LBS | BODY MASS INDEX: 36.96 KG/M2 | TEMPERATURE: 97.1 F | SYSTOLIC BLOOD PRESSURE: 120 MMHG | OXYGEN SATURATION: 95 % | RESPIRATION RATE: 18 BRPM | HEIGHT: 66 IN | DIASTOLIC BLOOD PRESSURE: 90 MMHG | HEART RATE: 88 BPM

## 2024-11-20 DIAGNOSIS — R21 RASH AND NONSPECIFIC SKIN ERUPTION: Primary | ICD-10-CM

## 2024-11-20 DIAGNOSIS — G89.29 CHRONIC NONINTRACTABLE HEADACHE, UNSPECIFIED HEADACHE TYPE: ICD-10-CM

## 2024-11-20 DIAGNOSIS — R51.9 CHRONIC NONINTRACTABLE HEADACHE, UNSPECIFIED HEADACHE TYPE: ICD-10-CM

## 2024-11-20 PROCEDURE — 99284 EMERGENCY DEPT VISIT MOD MDM: CPT | Mod: 25

## 2024-11-20 PROCEDURE — 96372 THER/PROPH/DIAG INJ SC/IM: CPT | Performed by: NURSE PRACTITIONER

## 2024-11-20 PROCEDURE — 2500000004 HC RX 250 GENERAL PHARMACY W/ HCPCS (ALT 636 FOR OP/ED): Performed by: NURSE PRACTITIONER

## 2024-11-20 PROCEDURE — 70450 CT HEAD/BRAIN W/O DYE: CPT | Performed by: RADIOLOGY

## 2024-11-20 PROCEDURE — 2500000005 HC RX 250 GENERAL PHARMACY W/O HCPCS: Performed by: NURSE PRACTITIONER

## 2024-11-20 PROCEDURE — 70450 CT HEAD/BRAIN W/O DYE: CPT

## 2024-11-20 RX ORDER — ONDANSETRON 4 MG/1
4 TABLET, ORALLY DISINTEGRATING ORAL ONCE
Status: COMPLETED | OUTPATIENT
Start: 2024-11-20 | End: 2024-11-20

## 2024-11-20 RX ORDER — HYDROCORTISONE 1 %
1 CREAM (GRAM) TOPICAL 2 TIMES DAILY
Qty: 6 G | Refills: 0 | Status: SHIPPED | OUTPATIENT
Start: 2024-11-20 | End: 2024-12-20

## 2024-11-20 RX ORDER — KETOROLAC TROMETHAMINE 30 MG/ML
15 INJECTION, SOLUTION INTRAMUSCULAR; INTRAVENOUS ONCE
Status: COMPLETED | OUTPATIENT
Start: 2024-11-20 | End: 2024-11-20

## 2024-11-20 RX ORDER — IBUPROFEN 600 MG/1
600 TABLET ORAL EVERY 6 HOURS PRN
Qty: 28 TABLET | Refills: 0 | Status: SHIPPED | OUTPATIENT
Start: 2024-11-20 | End: 2024-11-27

## 2024-11-20 ASSESSMENT — PAIN DESCRIPTION - PAIN TYPE: TYPE: CHRONIC PAIN

## 2024-11-20 ASSESSMENT — PAIN DESCRIPTION - LOCATION
LOCATION: HEAD
LOCATION: HEAD

## 2024-11-20 ASSESSMENT — COLUMBIA-SUICIDE SEVERITY RATING SCALE - C-SSRS
2. HAVE YOU ACTUALLY HAD ANY THOUGHTS OF KILLING YOURSELF?: NO
6. HAVE YOU EVER DONE ANYTHING, STARTED TO DO ANYTHING, OR PREPARED TO DO ANYTHING TO END YOUR LIFE?: NO
1. IN THE PAST MONTH, HAVE YOU WISHED YOU WERE DEAD OR WISHED YOU COULD GO TO SLEEP AND NOT WAKE UP?: NO

## 2024-11-20 ASSESSMENT — PAIN SCALES - GENERAL
PAINLEVEL_OUTOF10: 6
PAINLEVEL_OUTOF10: 0 - NO PAIN

## 2024-11-20 ASSESSMENT — PAIN - FUNCTIONAL ASSESSMENT
PAIN_FUNCTIONAL_ASSESSMENT: 0-10
PAIN_FUNCTIONAL_ASSESSMENT: 0-10

## 2024-11-20 ASSESSMENT — PAIN DESCRIPTION - DESCRIPTORS: DESCRIPTORS: PRESSURE

## 2024-11-20 NOTE — ED TRIAGE NOTES
Pt to ed with c/o headaches. Pt states she has had migraines since feb 2022. Pt states now they are getting worse with vision changes. Pt states she is sensitive to light. Denies sob/cp. - thinners.

## 2024-11-20 NOTE — ED PROVIDER NOTES
HPI   Chief Complaint   Patient presents with    Headache       HPI this is an alert and oriented 69-year-old female that presents to the emergency department with concerns for a migraine. She states she has a history of Migraines after suffering a head injury in 2022. She was hit in the back of the head with a basketball. This headache started yesterday. She is having sensitivity to light and nausea. She sees a Neurologist in December. Has not had any medications today. No new injury or trauma.         Patient History   Past Medical History:   Diagnosis Date    Concussion with loss of consciousness of 30 minutes or less, subsequent encounter 11/29/2021    Concussion with loss of consciousness of 30 minutes or less, subsequent encounter    Herpes zoster 05/19/2024    Personal history of other diseases of the circulatory system     History of hypertension    Personal history of other diseases of the female genital tract 12/21/2020    History of vaginitis    Personal history of other diseases of the respiratory system 09/22/2022    History of sinusitis    Personal history of other endocrine, nutritional and metabolic disease     History of diabetes mellitus    Personal history of other endocrine, nutritional and metabolic disease 08/25/2021    History of morbid obesity    Personal history of other infectious and parasitic diseases 04/28/2022    History of herpes zoster    Personal history of other specified conditions     History of seizure    Personal history of other specified conditions 12/12/2020    History of vertigo    Personal history of other specified conditions 01/27/2021    History of nasal congestion     Past Surgical History:   Procedure Laterality Date    OTHER SURGICAL HISTORY  01/02/2021    Subtotal thyroidectomy     Family History   Problem Relation Name Age of Onset    Breast cancer Cousin      Ovarian cancer Cousin       Social History     Tobacco Use    Smoking status: Former     Types: Cigarettes      Passive exposure: Never    Smokeless tobacco: Never   Vaping Use    Vaping status: Never Used   Substance Use Topics    Alcohol use: Yes    Drug use: Never       Physical Exam   ED Triage Vitals [11/20/24 0916]   Temperature Heart Rate Respirations BP   36.2 °C (97.1 °F) 96 18 139/84      Pulse Ox Temp src Heart Rate Source Patient Position   97 % -- -- --      BP Location FiO2 (%)     -- --       Physical Exam  Vitals and nursing note reviewed.   Constitutional:       General: She is not in acute distress.     Appearance: She is well-developed.   HENT:      Head: Normocephalic and atraumatic.   Eyes:      Conjunctiva/sclera: Conjunctivae normal.   Cardiovascular:      Rate and Rhythm: Normal rate and regular rhythm.      Heart sounds: No murmur heard.  Pulmonary:      Effort: Pulmonary effort is normal. No respiratory distress.      Breath sounds: Normal breath sounds.   Abdominal:      Palpations: Abdomen is soft.      Tenderness: There is no abdominal tenderness.   Musculoskeletal:         General: No swelling.      Cervical back: Neck supple.   Skin:     General: Skin is warm and dry.      Capillary Refill: Capillary refill takes less than 2 seconds.   Neurological:      Mental Status: She is alert and oriented to person, place, and time. Mental status is at baseline.      GCS: GCS eye subscore is 4. GCS verbal subscore is 5. GCS motor subscore is 6.      Sensory: No sensory deficit.      Motor: No weakness.   Psychiatric:         Mood and Affect: Mood normal.     0  0  0  0  0  0  0  0  0  0  0  0  0  0  0  0     NIH Stroke Scale: 0       ED Course & MDM   Diagnoses as of 11/20/24 1321   Rash and nonspecific skin eruption   Chronic nonintractable headache, unspecified headache type                 No data recorded     [place] Coma Scale Score: 15 (11/20/24 1220 : Monse Ramirez RN)       NIH Stroke Scale: 0 (11/20/24 1315 : RANDI Fine-MAGDALENO)                   Medical Decision Making  On initial  evaluation of the patient she is nontoxic and well-appearing.  She is hemodynamically stable.  Her NIH is 0.  I completed a chart review and patient has not had a CT since 2023 and has had these headaches which she states are becoming more frequent and worsening with time therefore I did obtain a CT today.  Results of the CT shows no acute intracranial pathology.  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  And then there is also an unchanged 0.8 cm right frontal calcified meningioma that was also seen on a previous CT.  I discussed the results of all the findings with the patient.  She was given medications here for headache and headache did resolve.  She will be scheduled for an outpatient MRI brain which was ordered upon discharge.  She has an appointment with a neurologist in 2 weeks.  Also recommending following up with her PCP.  Patient also had me look at a rash that has developed on her right arm that is itchy and not painful.  Unsure what caused the rash.  Rash is not spreading.  She will be prescribed hydrocortisone cream for the rash.  Lengthy discussion was had in regards to stroke symptoms and return precautions.    Amount and/or Complexity of Data Reviewed  Radiology: ordered. Decision-making details documented in ED Course.     Details: CT brain ordered, please see [facility] for details      CT head wo IV contrast   Final Result   1. No acute intracranial pathology.   2. Remote infarct right posterior frontal region.   3. Unchanged 0.8 cm right frontal calcified meningioma.        MACRO:   None        Signed by: Alana White 11/20/2024 11:46 AM   Dictation workstation:   [id number]      MR brain wo IV contrast    (Results Pending)      CT head wo IV contrast   Final Result   1. No acute intracranial pathology.   2. Remote infarct right posterior frontal region.   3. Unchanged 0.8 cm right frontal calcified meningioma.        MACRO:   None        Signed by: Alana White 11/20/2024  11:46 AM   Dictation workstation:   [id number]      MR brain wo IV contrast    (Results Pending)      Procedure  Procedures     Yamel Alex, RANDI-CNP  11/20/24 3181

## 2024-11-20 NOTE — DISCHARGE INSTRUCTIONS
Follow-up with your neurologist.  An order was placed for an outpatient MRI.  Please call to schedule.  Return for any worsening or changes in symptoms.

## 2024-11-20 NOTE — LETTER
November 20, 2024    Patient: Earline Villagran   YOB: 1955   Date of Visit: 11/20/2024       To Whom It May Concern:    Earline Villagran was seen and treated in our emergency department on 11/20/2024. She can return to work 11/21/24.    If you have any questions or concerns, please don't hesitate to call.

## 2024-11-29 ENCOUNTER — APPOINTMENT (OUTPATIENT)
Dept: RADIOLOGY | Facility: CLINIC | Age: 69
End: 2024-11-29
Payer: MEDICARE

## 2024-11-29 ENCOUNTER — HOSPITAL ENCOUNTER (OUTPATIENT)
Dept: RADIOLOGY | Facility: CLINIC | Age: 69
Discharge: HOME | End: 2024-11-29
Payer: MEDICARE

## 2024-11-29 DIAGNOSIS — R51.9 CHRONIC NONINTRACTABLE HEADACHE, UNSPECIFIED HEADACHE TYPE: ICD-10-CM

## 2024-11-29 DIAGNOSIS — G89.29 CHRONIC NONINTRACTABLE HEADACHE, UNSPECIFIED HEADACHE TYPE: ICD-10-CM

## 2024-11-29 PROCEDURE — 70551 MRI BRAIN STEM W/O DYE: CPT

## 2024-12-02 PROBLEM — E11.40 TYPE 2 DIABETES MELLITUS WITH DIABETIC NEUROPATHY, UNSPECIFIED (MULTI): Status: ACTIVE | Noted: 2024-12-02

## 2024-12-02 RX ORDER — LANCETS 33 GAUGE
EACH MISCELLANEOUS
COMMUNITY
Start: 2024-05-24

## 2024-12-02 RX ORDER — LANCETS 30 GAUGE
EACH MISCELLANEOUS
COMMUNITY
Start: 2024-05-24

## 2024-12-02 RX ORDER — OMEPRAZOLE 20 MG/1
CAPSULE, DELAYED RELEASE ORAL
COMMUNITY
Start: 2024-10-31 | End: 2024-12-02 | Stop reason: WASHOUT

## 2024-12-02 RX ORDER — SEMAGLUTIDE 0.68 MG/ML
INJECTION, SOLUTION SUBCUTANEOUS
COMMUNITY
Start: 2024-05-06

## 2024-12-02 RX ORDER — DULAGLUTIDE 1.5 MG/.5ML
INJECTION, SOLUTION SUBCUTANEOUS
COMMUNITY
Start: 2024-06-18

## 2024-12-03 ENCOUNTER — APPOINTMENT (OUTPATIENT)
Dept: PRIMARY CARE | Facility: CLINIC | Age: 69
End: 2024-12-03
Payer: MEDICARE

## 2024-12-03 VITALS
DIASTOLIC BLOOD PRESSURE: 64 MMHG | OXYGEN SATURATION: 98 % | SYSTOLIC BLOOD PRESSURE: 92 MMHG | HEART RATE: 111 BPM | HEIGHT: 66 IN | WEIGHT: 228.2 LBS | TEMPERATURE: 97 F | BODY MASS INDEX: 36.67 KG/M2

## 2024-12-03 DIAGNOSIS — G89.29 CHRONIC INTRACTABLE HEADACHE, UNSPECIFIED HEADACHE TYPE: Primary | ICD-10-CM

## 2024-12-03 DIAGNOSIS — R51.9 CHRONIC INTRACTABLE HEADACHE, UNSPECIFIED HEADACHE TYPE: Primary | ICD-10-CM

## 2024-12-03 PROCEDURE — 99214 OFFICE O/P EST MOD 30 MIN: CPT | Performed by: FAMILY MEDICINE

## 2024-12-03 PROCEDURE — 1036F TOBACCO NON-USER: CPT | Performed by: FAMILY MEDICINE

## 2024-12-03 PROCEDURE — 1123F ACP DISCUSS/DSCN MKR DOCD: CPT | Performed by: FAMILY MEDICINE

## 2024-12-03 PROCEDURE — 3046F HEMOGLOBIN A1C LEVEL >9.0%: CPT | Performed by: FAMILY MEDICINE

## 2024-12-03 PROCEDURE — 1160F RVW MEDS BY RX/DR IN RCRD: CPT | Performed by: FAMILY MEDICINE

## 2024-12-03 PROCEDURE — 3008F BODY MASS INDEX DOCD: CPT | Performed by: FAMILY MEDICINE

## 2024-12-03 PROCEDURE — 3074F SYST BP LT 130 MM HG: CPT | Performed by: FAMILY MEDICINE

## 2024-12-03 PROCEDURE — 3078F DIAST BP <80 MM HG: CPT | Performed by: FAMILY MEDICINE

## 2024-12-03 PROCEDURE — 1159F MED LIST DOCD IN RCRD: CPT | Performed by: FAMILY MEDICINE

## 2024-12-03 NOTE — PROGRESS NOTES
"Subjective   Patient ID: Earline Villagran is a 69 y.o. female who presents for Hospital Follow-up and Headache.    HPI   Has been having recurrent issues with migraines since 2022.  Has tried several medications but has been in and out of ER in last 6 months for more frequent and worsening migraines.  On last ER, had MRI ordered which she did yesterday and has neurology follow up scheduled.    MRI results:  IMPRESSION:  No evidence of acute infarct, intracranial mass effect or midline  shift.      Encephalomalacia and gliosis within the posterior right frontal lobe  compatible with a prior infarct.      Mild degree of nonspecific white matter signal compatible with  microangiopathy.      Subcentimeter extra-axial lesion along the left frontal convexity  likely represents a calcified meningioma.  Review of Systems  Negative unless noted in HPI    Objective   BP 92/64   Pulse (!) 111   Temp 36.1 °C (97 °F) (Temporal)   Ht 1.67 m (5' 5.75\")   Wt 104 kg (228 lb 3.2 oz)   SpO2 98%   BMI 37.11 kg/m²     Physical Exam  Constitutional:       Appearance: Normal appearance.   HENT:      Head: Normocephalic.   Eyes:      Extraocular Movements: Extraocular movements intact.      Pupils: Pupils are equal, round, and reactive to light.   Cardiovascular:      Rate and Rhythm: Normal rate and regular rhythm.   Pulmonary:      Effort: Pulmonary effort is normal.      Breath sounds: Normal breath sounds.   Neurological:      General: No focal deficit present.      Mental Status: She is alert.   Psychiatric:         Mood and Affect: Mood normal.         Behavior: Behavior normal.         Assessment/Plan   Problem List Items Addressed This Visit             ICD-10-CM    Chronic intractable headache - Primary R51.9, G89.29     Reviewed recent workup as well as MRI findings  Discussed past changes noted on findings and ways to prevent  Wonder if Mounjaro may be aggravating some symptoms as she does not nausea, vomiting, " lightheadedness  Encourage regular hydration  Has follow up with neurology and encourage her to keep appointment

## 2024-12-03 NOTE — ASSESSMENT & PLAN NOTE
Reviewed recent workup as well as MRI findings  Discussed past changes noted on findings and ways to prevent  Wonder if Mounjaro may be aggravating some symptoms as she does not nausea, vomiting, lightheadedness  Encourage regular hydration  Has follow up with neurology and encourage her to keep appointment

## 2024-12-05 NOTE — PROGRESS NOTES
History Of Present Illness  Earline Villagran is a 69 y.o. female with CKD presenting with recurrent UTI activity in context of type 2 diabetes.  Recently tx'd with UTI and reports 6 in past year, treated at urgent care. Reports blood sugar currently well controlled on Monjouro and glimepiride.     Positive urine cx: outside records  Negative urine cx: 11/24/24  Indeterminate urine cx: 8/21/24     Past Medical History  She has a past medical history of Concussion with loss of consciousness of 30 minutes or less, subsequent encounter (11/29/2021), Diabetes mellitus (Multi), Herpes zoster (05/19/2024), Hypertension, Personal history of other diseases of the circulatory system, Personal history of other specified conditions, Personal history of other specified conditions (12/12/2020), and Stroke (Multi) (8/1976).    Surgical History  She has a past surgical history that includes Other surgical history (01/02/2021); Hernia repair (Baby); and Hysterectomy (Feb22).     Social History  She reports that she quit smoking about 25 years ago. Her smoking use included cigarettes. She has a 3.8 pack-year smoking history. She has never been exposed to tobacco smoke. She has never used smokeless tobacco. She reports that she does not currently use alcohol. She reports that she does not use drugs.    Family History  Family History   Problem Relation Name Age of Onset    Breast cancer Cousin      Ovarian cancer Cousin      Hypertension Father Dylan Ruelas     Cancer Maternal Grandmother Kim rowland         Allergies  Raspberry, Shrimp, Ciprofloxacin, Clindamycin, Green tea, Penicillins, Sulfamethoxazole-trimethoprim, and Phenylephrine-guaifenesin    Review of Systems   Constitutional:  Negative for chills and fever.   All other systems reviewed and are negative.       Physical Exam   Con: awake, alert, NAD  HEENT: normocephalic, speech normal  CV: no peripheral edema  Resp: no increased work of breathing  Neuro: normal  "mentation  Psych: mood normal  Skin: back rash present across left upper back consistent with shingles      Last Recorded Vitals  Blood pressure 126/79, pulse 92, height 1.651 m (5' 5\"), weight 103 kg (228 lb).      Assessment/Plan   Vaginal estrogen prescribed. Discussed options of D mannose and abx suppression however pt desires vaginal estrogen monotherapy at this time.   Urinalysis/urine cx sent and standing labs ordered  Valtrex prescribed for shingles    Pepper Webb MD, Gynecologist  [facility] Fellow  Dept of Urology/OBGYN  12/6/2024      "

## 2024-12-06 ENCOUNTER — APPOINTMENT (OUTPATIENT)
Dept: UROLOGY | Facility: CLINIC | Age: 69
End: 2024-12-06
Payer: MEDICARE

## 2024-12-06 VITALS
HEART RATE: 92 BPM | SYSTOLIC BLOOD PRESSURE: 126 MMHG | WEIGHT: 228 LBS | DIASTOLIC BLOOD PRESSURE: 79 MMHG | BODY MASS INDEX: 37.99 KG/M2 | HEIGHT: 65 IN

## 2024-12-06 DIAGNOSIS — N39.0 RECURRENT UTI: Primary | ICD-10-CM

## 2024-12-06 DIAGNOSIS — R39.9 URINARY SYMPTOM OR SIGN: ICD-10-CM

## 2024-12-06 DIAGNOSIS — B02.9 HERPES ZOSTER WITHOUT COMPLICATION: ICD-10-CM

## 2024-12-06 LAB
POC APPEARANCE, URINE: CLEAR
POC BILIRUBIN, URINE: NEGATIVE
POC BLOOD, URINE: NEGATIVE
POC COLOR, URINE: YELLOW
POC GLUCOSE, URINE: NEGATIVE MG/DL
POC KETONES, URINE: NEGATIVE MG/DL
POC LEUKOCYTES, URINE: ABNORMAL
POC NITRITE,URINE: NEGATIVE
POC PH, URINE: 5.5 PH
POC PROTEIN, URINE: NEGATIVE MG/DL
POC SPECIFIC GRAVITY, URINE: 1.02
POC UROBILINOGEN, URINE: 0.2 EU/DL

## 2024-12-06 PROCEDURE — 1036F TOBACCO NON-USER: CPT | Performed by: STUDENT IN AN ORGANIZED HEALTH CARE EDUCATION/TRAINING PROGRAM

## 2024-12-06 PROCEDURE — 3008F BODY MASS INDEX DOCD: CPT | Performed by: STUDENT IN AN ORGANIZED HEALTH CARE EDUCATION/TRAINING PROGRAM

## 2024-12-06 PROCEDURE — 3046F HEMOGLOBIN A1C LEVEL >9.0%: CPT | Performed by: STUDENT IN AN ORGANIZED HEALTH CARE EDUCATION/TRAINING PROGRAM

## 2024-12-06 PROCEDURE — 3078F DIAST BP <80 MM HG: CPT | Performed by: STUDENT IN AN ORGANIZED HEALTH CARE EDUCATION/TRAINING PROGRAM

## 2024-12-06 PROCEDURE — G2211 COMPLEX E/M VISIT ADD ON: HCPCS | Performed by: STUDENT IN AN ORGANIZED HEALTH CARE EDUCATION/TRAINING PROGRAM

## 2024-12-06 PROCEDURE — 87086 URINE CULTURE/COLONY COUNT: CPT

## 2024-12-06 PROCEDURE — 81003 URINALYSIS AUTO W/O SCOPE: CPT | Performed by: STUDENT IN AN ORGANIZED HEALTH CARE EDUCATION/TRAINING PROGRAM

## 2024-12-06 PROCEDURE — 1159F MED LIST DOCD IN RCRD: CPT | Performed by: STUDENT IN AN ORGANIZED HEALTH CARE EDUCATION/TRAINING PROGRAM

## 2024-12-06 PROCEDURE — 1123F ACP DISCUSS/DSCN MKR DOCD: CPT | Performed by: STUDENT IN AN ORGANIZED HEALTH CARE EDUCATION/TRAINING PROGRAM

## 2024-12-06 PROCEDURE — 3074F SYST BP LT 130 MM HG: CPT | Performed by: STUDENT IN AN ORGANIZED HEALTH CARE EDUCATION/TRAINING PROGRAM

## 2024-12-06 PROCEDURE — 99204 OFFICE O/P NEW MOD 45 MIN: CPT | Performed by: STUDENT IN AN ORGANIZED HEALTH CARE EDUCATION/TRAINING PROGRAM

## 2024-12-06 RX ORDER — VALACYCLOVIR HYDROCHLORIDE 1 G/1
1000 TABLET, FILM COATED ORAL 3 TIMES DAILY
Qty: 21 TABLET | Refills: 0 | Status: SHIPPED | OUTPATIENT
Start: 2024-12-06 | End: 2024-12-13

## 2024-12-06 RX ORDER — ESTRADIOL 0.1 MG/G
CREAM VAGINAL
Qty: 42.5 G | Refills: 11 | Status: SHIPPED | OUTPATIENT
Start: 2024-12-06 | End: 2025-12-06

## 2024-12-06 ASSESSMENT — ENCOUNTER SYMPTOMS
FEVER: 0
CHILLS: 0

## 2024-12-08 DIAGNOSIS — R09.81 SINUS CONGESTION: ICD-10-CM

## 2024-12-08 DIAGNOSIS — L29.9 ITCHING: Primary | ICD-10-CM

## 2024-12-08 DIAGNOSIS — M25.562 CHRONIC PAIN OF BOTH KNEES: ICD-10-CM

## 2024-12-08 DIAGNOSIS — M25.561 CHRONIC PAIN OF BOTH KNEES: ICD-10-CM

## 2024-12-08 DIAGNOSIS — G89.29 CHRONIC PAIN OF BOTH KNEES: ICD-10-CM

## 2024-12-08 LAB — BACTERIA UR CULT: NORMAL

## 2024-12-08 RX ORDER — HYDROXYZINE HYDROCHLORIDE 10 MG/1
10 TABLET, FILM COATED ORAL EVERY 8 HOURS PRN
Qty: 270 TABLET | Refills: 0 | Status: SHIPPED | OUTPATIENT
Start: 2024-12-08 | End: 2024-12-08 | Stop reason: SDUPTHER

## 2024-12-08 RX ORDER — HYDROXYZINE HYDROCHLORIDE 10 MG/1
10 TABLET, FILM COATED ORAL EVERY 8 HOURS PRN
Qty: 270 TABLET | Refills: 0 | Status: SHIPPED | OUTPATIENT
Start: 2024-12-08

## 2024-12-08 RX ORDER — GABAPENTIN 100 MG/1
100 CAPSULE ORAL 3 TIMES DAILY
Qty: 270 CAPSULE | Refills: 1 | Status: SHIPPED | OUTPATIENT
Start: 2024-12-08 | End: 2025-06-06

## 2024-12-08 RX ORDER — FLUTICASONE PROPIONATE 50 MCG
2 SPRAY, SUSPENSION (ML) NASAL DAILY
Qty: 48 ML | Refills: 1 | Status: SHIPPED | OUTPATIENT
Start: 2024-12-08 | End: 2024-12-08 | Stop reason: SDUPTHER

## 2024-12-08 RX ORDER — FLUTICASONE PROPIONATE 50 MCG
2 SPRAY, SUSPENSION (ML) NASAL DAILY
Qty: 48 ML | Refills: 1 | Status: SHIPPED | OUTPATIENT
Start: 2024-12-08

## 2024-12-08 RX ORDER — GABAPENTIN 100 MG/1
100 CAPSULE ORAL 3 TIMES DAILY
Qty: 270 CAPSULE | Refills: 1 | Status: SHIPPED | OUTPATIENT
Start: 2024-12-08 | End: 2024-12-08 | Stop reason: SDUPTHER

## 2024-12-10 NOTE — PROGRESS NOTES
Chief Complaint   Patient presents with    New Patient Visit    Headache       Subjective   HPI  Earline Villagran is a 69 y.o. year old female who presents with chief complaint Chronic nonintractable headache, unspecified headache type [R51.9, G89.29]  PMH significant for chronic headaches, allergic rhinitis, hypothyroidism, T2DM, TIA, CAD, HTN, CKD Stage 3, GERD, obesity, and arthritis. Patient endorses CVA/ blood clot in the head at age 21, was on phenobarbital and dilantin.     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     2/2023 ct c spine for head injury with vertigo, headache, and unsteady walking. please  bring wheel chair     Earline started getting headaches at age 67.  She states she has a history of Migraines after suffering a head injury in 2022. She was hit in the back of the head with a basketball. No prior history of HAs or migraines.    Headaches gradually worsening in frequency and severity.  Has tried several medications but has been in and out of ER in last 6 months for more frequent and worsening migraines.   Currently having 20 HA days per month. Every other day kind of headaches.   Triggers include caffeine, bright lights, loud noises, possibly with changes in barometric pressure.   Gets lines and spots before headache starts. Bright spots, zig zag lines in visual field start and then the headache follows. The visual disturbances remain. One time had a loss of vision, only saw white; lasted about 15 minutes, then subsided.   The headaches are usually throbbing and are located left side of head, towards the back.  generally unilateral. The patient rates the most severe headaches a 10 in intensity.   Associated photophobia and phonophobia, vestibular symptoms/ unsteady on feet during headache, nausea; occasional vomiting.   Headaches are worsened with exertion. No change in  character with sneezing, coughing. Bending over causes increased dizziness. Laying down improves the headache.    Generally, headaches last several hours.   Currently using Tylenol PM daily for headache pain. States that she also has to lay down with a warm towel over her eyes and lays in a dark, quite room. Sometimes is able to go to sleep. States she is so exhausted after them.   Work attendance or other daily activities affected: work has been affected-  has to come get her. Missed about at least half a month of work per - missed a lot of time in November  Caffeine:  no caffeine. Can trigger her headaches. Trying to drink water, but difficult.   Sleep:  sometimes has trouble falling a sleep. Sleeps about 6 hours per night. Wakes through the night (sometimes to urinate, can't get back to sleep). Endorses snoring. Does not wake feeling rested.   Previous head CT/ brain MRI? MRI done on 11/29/24. CT on 11/20/24 Per , showed a mini stroke.   Endorses regular dental checks and regular vision checks- last vision check about a year ago.    Medications  Preventive:  Gabapentin- for nerve pain from the diabetes (per patient- taking PRN)  Cyclobenzaprine- has not taken in a long time    Rescue:  Toradol in ED 11/20/24 (15 mg)  Nurtec took head pain away, but made stomach painful   Ibuprofen 600 mg- was okay  PM Tylenol somewhat helpful    Current Outpatient Medications:     acetaminophen (Tylenol) 325 mg tablet, Take 3 tablets (975 mg) by mouth every 6 hours if needed for moderate pain (4 - 6) or mild pain (1 - 3)., Disp: 30 tablet, Rfl: 0    amLODIPine (Norvasc) 5 mg tablet, Take 1 tablet (5 mg) by mouth once daily., Disp: 90 tablet, Rfl: 1    aspirin 81 mg EC tablet, Take 1 tablet (81 mg) by mouth once daily., Disp: , Rfl:     blood sugar diagnostic (OneTouch Ultra Test) strip, TEST GLUCOSE 2 TO 3 TIMES DAILY, Disp: 300 strip, Rfl: 3    carvedilol (Coreg) 3.125 mg tablet, Take 1 tablet (3.125 mg) by  mouth 2 times a day., Disp: 180 tablet, Rfl: 1    estradiol (Estrace) 0.01 % (0.1 mg/gram) vaginal cream, Apply 0.5g (blueberry size amount) nightly for 2 weeks, then 2 - 3 times per week, Disp: 42.5 g, Rfl: 11    fluticasone (Flonase) 50 mcg/actuation nasal spray, Administer 2 sprays into each nostril once daily., Disp: 48 mL, Rfl: 1    gabapentin (Neurontin) 100 mg capsule, Take 1 capsule (100 mg) by mouth 3 times a day., Disp: 270 capsule, Rfl: 1    glimepiride (AmaryL) 4 mg tablet, Take 1 tablet (4 mg) by mouth in the morning and at bedtime., Disp: 180 tablet, Rfl: 1    hydroCHLOROthiazide (HYDRODiuril) 25 mg tablet, Take 1 tablet (25 mg) by mouth once daily., Disp: 90 tablet, Rfl: 1    hydrocortisone 1 % cream, Apply 1 Application topically 2 times a day. Apply to affected area 2 times daily, Disp: 6 g, Rfl: 0    hydrOXYzine HCL (Atarax) 10 mg tablet, Take 1 tablet (10 mg) by mouth every 8 hours if needed for itching., Disp: 270 tablet, Rfl: 0    levothyroxine (Synthroid, Levoxyl) 125 mcg tablet, Take 1 tablet (125 mcg) by mouth once daily., Disp: 90 tablet, Rfl: 0    loratadine 10 mg capsule, Take 10 mg by mouth once daily., Disp: , Rfl:     omeprazole (PriLOSEC) 20 mg tablet,delayed release (DR/EC) EC tablet, Take 1 tablet (20 mg) by mouth once daily., Disp: 90 tablet, Rfl: 1    OneTouch Delica Plus Lancet 33 gauge misc, USE WITH BLOOD GLUCOSE TEST ONCE DAILY, Disp: , Rfl:     OneTouch Ultra2 Meter misc, 1 DEVICE ONCE DAILY. TEST ONE TIME A DAY. INSULIN DEP? NO E11.9 DM 2, Disp: , Rfl:     tirzepatide (Mounjaro) 12.5 mg/0.5 mL pen injector, Inject 12.5 mg under the skin 1 (one) time per week., Disp: 6 mL, Rfl: 0    valACYclovir (Valtrex) 1 gram tablet, Take 1 tablet (1,000 mg) by mouth 3 times a day for 7 days., Disp: 21 tablet, Rfl: 0    atorvastatin (Lipitor) 40 mg tablet, Take 1 tablet (40 mg) by mouth once daily. (Patient not taking: Reported on 12/11/2024), Disp: 90 tablet, Rfl: 2    cyclobenzaprine  "(Flexeril) 10 mg tablet, Take 1 tablet (10 mg) by mouth 3 times a day as needed for muscle spasms. (Patient not taking: Reported on 12/3/2024), Disp: 30 tablet, Rfl: 0    semaglutide (Ozempic) 0.25 mg or 0.5 mg (2 mg/3 mL) pen injector, , Disp: , Rfl:     sharps container, 1 each if needed (For Mounjaro pen disposal). (Patient not taking: Reported on 12/6/2024), Disp: 1 each, Rfl: 0    SITagliptin phosphate (Januvia) 100 mg tablet, , Disp: , Rfl:     Trulicity 1.5 mg/0.5 mL pen injector injection, INJECT 1.5 MG SUBCUTANEOUSLY ONE TIME A WEEK. (Patient not taking: Reported on 12/6/2024), Disp: , Rfl:     Allergies   Allergen Reactions    Raspberry Shortness of breath    Shrimp Anaphylaxis    Ciprofloxacin Rash     Itching, Itching    Clindamycin Diarrhea     Other Reaction(s): GI Upset      diarrhea, diarrhea    Green Tea Swelling     Lip swells   W green tea and neto, Lip swells   W green tea and neto    Penicillins Syncope     Other Reaction(s): Other (See Comments), Syncope, Unknown      Other reaction(s): Other (See Comments) PASSED OUT, PASSED OUT      PASSED OUT, PASSED OUT      \"Passed out\"    Sulfamethoxazole-Trimethoprim Hives, Unknown and Rash     Other Reaction(s): Unknown    Phenylephrine-Guaifenesin Palpitations     Other Reaction(s): Other: See Comments      Other reaction(s): Other: See Comments Fast heart rate      Fast heart rate       Past Medical History:   Diagnosis Date    Allergic     Arthritis     Concussion with loss of consciousness of 30 minutes or less, subsequent encounter 11/29/2021    Concussion with loss of consciousness of 30 minutes or less, subsequent encounter    Diabetes mellitus (Multi)     GERD (gastroesophageal reflux disease)     Herpes zoster 05/19/2024    Hypertension     Personal history of other diseases of the circulatory system     History of hypertension    Personal history of other specified conditions     History of seizure    Personal history of other " specified conditions 2020    History of vertigo    Seizures (Multi)     Stroke (Multi) 1976     Past Surgical History:   Procedure Laterality Date    HERNIA REPAIR  Baby    HYSTERECTOMY      OTHER SURGICAL HISTORY  2021    Subtotal thyroidectomy     Family History   Problem Relation Name Age of Onset    Breast cancer Cousin      Ovarian cancer Cousin      Hypertension Father Dylan Ruelas     Cancer Maternal Grandmother Kim rowland      Social History     Tobacco Use    Smoking status: Former     Current packs/day: 0.00     Average packs/day: 0.3 packs/day for 15.0 years (3.8 ttl pk-yrs)     Types: Cigarettes     Quit date: 10/19/1999     Years since quittin.1     Passive exposure: Never    Smokeless tobacco: Never    Tobacco comments:     Over 25 years ago   Substance Use Topics    Alcohol use: Not Currently     Comment: Once a month maybe     Social History     Substance and Sexual Activity   Drug Use Never      ROS  As noted in HPI, otherwise all other systems have been reviewed are negative for complaint.     Objective   General Appearance: Earline is well-developed, well-nourished, 69 y.o. year old female, in no acute distress. Makes good eye contact, is alert, interactive, and cooperative. Demonstrates recent & remote memory recall. Subjective information consistent with objective assessment.     Vitals:    24 0852   BP: 138/76   Pulse: 108   Resp: 18   Temp: 36.4 °C (97.6 °F)   TempSrc: Temporal   PainSc:   3   PainLoc: Head       Lab Results   Component Value Date    WBC 7.6 2024    RBC 4.46 2024    HGB 13.2 2024    HCT 39.9 2024     2024     2024    K 3.9 2024    CL 97 (L) 2024    BUN 15 2024    CREATININE 1.11 (H) 2024    EGFR 54 (L) 2024    CALCIUM 8.9 2024    ALKPHOS 72 2024    AST 15 2024    ALT 17 2024    MG 1.83 2024    HGBA1C 11.8 (A) 2024    LDLCALC 140  (H) 12/29/2023    CHOL 222 (H) 12/29/2023    HDL 50.3 12/29/2023    TRIG 161 (H) 12/29/2023    TSH 7.03 (H) 05/19/2024        MENTAL STATUS:      No data recorded     HEADACHE EXAM:  No tenderness to palpation during both active and passive ROM testing in the cervicogenic region  No tenderness to palpation in bilateral occipital notches  No tenderness to palpation in bilateral temples  No tenderness to palpation during TMJ testing      Eye Exam  OPHTHALMOSCOPIC:   The ophthalmoscopic exam was normal. The fundi were well visualized with normal disc margins, clear vessels and vascular pulsations. No disc edema. The cup/disk ratio was not enlarged. No hemorrhages or exudates were present in the posterior segments that were visualized.     Neurological Exam  CRANIAL NERVES:   CN III, IV, VI: Extraocular movements intact bilaterally. Normal lids and orbits bilaterally.  CN VII: Full and symmetric facial movement.  CN VIII: Hearing is normal.    CRANIAL NERVES:   CN 2: Visual fields full to confrontation.   CN 3, 4, 6: Pupils round, 4 mm in diameter, equally reactive to light. Eyelids symmetric; no ptosis. EOMs normal alignment, full range with normal saccades, pursuit, & convergence. No noted nystagmus.   CN 5: Facial sensation intact bilaterally.   CN 7: Normal and symmetric facial strength. Nasolabial folds symmetric.   CN 8: Hearing intact to conversation and finger rub.  CN 9, 10: Palate elevates symmetrically.  CN 11: Normal strength of shoulder shrug and neck turning.   CN 12: Tongue midline, with normal bulk and strength; no fasciculations.     MOTOR:   Muscle bulk and tone were normal in both upper and lower extremities.   No pronator drift bilaterally.  No fasciculations, tremor or other abnormal movements evident with the patient examined clothed.    STRENGTH:   RUE 5/5  LUE 5/5  RLE 5/5  LLE 5/5    SENSORY:   Sensation intact to light touch throughout all extremities     REFLEXES: R L  Biceps  2 2                      Triceps  2 2  Patellar  2 2     COORDINATION:   In both upper extremities, finger-nose-finger intact without dysmetria or overshoot.     GAIT:   Station stable with a normal base. Gait stable with a normal arm swing and speed. No ataxia, shuffling, steppage or waddling present. No circumduction was present.   No Romberg sign present.    RECORDS REVIEW:  Previous records (provider notes, laboratory reports, and imaging studies were reviewed and summarized).    11/20/2024 MR brain wo IV contrast  Narrative & Impression   Interpreted By:  Nicole Hardin,   STUDY:  MR BRAIN WO IV CONTRAST;  11/29/2024 10:18 am      INDICATION:  Signs/Symptoms:Posterior frontal lobe infarct seen on CT.      ,R51.9 Headache, unspecified,G89.29 Other chronic pain      COMPARISON:  CT head 11/20/2024      ACCESSION NUMBER(S):  [id number]      ORDERING CLINICIAN:  KAREN FUENTES      TECHNIQUE:  Axial T2, FLAIR, DWI, gradient echo T2 and sagittal and coronal T1  weighted images of brain were acquired.      FINDINGS:  CSF Spaces: The ventricles, sulci and basal cisterns are within  normal limits. There is no extra-axial fluid collection. There is a  0.8 cm extra-axial lesion with susceptibility artifact which likely  represents a calcified meningioma along the left frontal convexity.  There is no mass effect upon the adjacent brain parenchyma.      Parenchyma: There is no diffusion restriction abnormality to suggest  acute infarct.  There is encephalomalacia and gliosis within the  posterior right frontal lobe compatible with a prior infarct. There  is a mild degree of nonspecific subcortical and periventricular T2  and FLAIR hyperintense signal compatible with microangiopathy. There  is no mass effect or midline shift. Cerebellar tonsils are above the  foramen magnum. Pituitary and sella are not enlarged. No abnormal  susceptibility artifact.      Paranasal Sinuses and Mastoids: Visualized paranasal sinuses and  mastoid air cells are  predominantly clear..      IMPRESSION:  No evidence of acute infarct, intracranial mass effect or midline  shift.      Encephalomalacia and gliosis within the posterior right frontal lobe  compatible with a prior infarct.      Mild degree of nonspecific white matter signal compatible with  microangiopathy.      Subcentimeter extra-axial lesion along the left frontal convexity  likely represents a calcified meningioma.           11/20/2024 CT head wo IV contrast  Narrative & Impression   Interpreted By:  Alana White,   STUDY:  CT HEAD WO IV CONTRAST;  11/20/2024 11:28 am      INDICATION:  Signs/Symptoms:headache.      COMPARISON:  03/17/2023      ACCESSION NUMBER(S):  [id number]      ORDERING CLINICIAN:  KAREN FUENTES      TECHNIQUE:  Examination was performed in the axial plane with sagittal and  coronal reconstructions.      FINDINGS:  INTRACRANIAL:  There is prominence of the ventricular system and cerebral sulci  consistent with mild cerebral atrophy. There is a 0.8 cm calcified  mass projecting off of the left frontal bone along the cerebral  convexity, unchanged. Findings are consistent with a benign calcified  meningioma. There is no underlying edema involving the adjacent brain  parenchyma. There is no hemorrhage or subdural fluid collection.  There is no acute infarct.  There is a bandlike remote infarct along the right posterior frontal  region along the cerebral convexity.      EXTRACRANIAL:  Visualized paranasal sinuses and mastoids are clear.      IMPRESSION:  1. No acute intracranial pathology.  2. Remote infarct right posterior frontal region.  3. Unchanged 0.8 cm right frontal calcified meningioma.           2/13/2023 CT CERVICAL SPINE WO IV CONTRAST  Narrative & Impression   MRN: [id number]  Patient Name: LULU DUNN     STUDY:  CT HEAD WO CONTRAST; CT C-SPINE WO CONTRAST; ;  2/13/2023 7:03 pm     INDICATION:  head injury with vertigo, headache, and unsteady walking. please  bring wheel  chair .     COMPARISON:  CT head of 12/03/2022. CT cervical spine of 11/21/2021.     ACCESSION NUMBER(S):  [id number]; [id number]     ORDERING CLINICIAN:  SHAILESH ESPINAL     TECHNIQUE:  Noncontrast CT exams of the head and cervical spine with multiplanar  reformations.     FINDINGS:  BRAIN PARENCHYMA: Mild-to-moderate volume loss.  Encephalomalacia in  the right parietal lobe again noted. Gray-white matter interfaces are  preserved. No mass, mass effect or midline shift.     HEMORRHAGE: No acute intracranial hemorrhage.  VENTRICLES and EXTRA-AXIAL SPACES: Normal size.  EXTRACRANIAL SOFT TISSUES: Within normal limits.  PARANASAL SINUSES/MASTOIDS: The visualized paranasal sinuses and  mastoid air cells are aerated.  CALVARIUM: No depressed skull fracture. No destructive osseous lesion.     OTHER FINDINGS: None.     CERVICAL SPINE:     Straightening of normal cervical lordosis could reflect positioning  or muscle spasm.  ALIGNMENT: Normal.  VERTEBRAE: No acute fracture. Vertebral stature is maintained. Mild  degenerative endplate osteophytosis.  SPINAL CANAL: No critical spinal canal stenosis.  PREVERTEBRAL SOFT TISSUES: No prevertebral soft tissue swelling.  LUNG APICES: Imaged portion of the lung apices are within normal  limits.     OTHER FINDINGS: Thyroid is absent.     IMPRESSION:  No evidence of acute intracranial abnormality.     No acute cervical spine fracture or malalignment.         Assessment & Plan  Intractable chronic migraine with aura with status migrainosus    Orders:    nortriptyline (Pamelor) 25 mg capsule; Take 1 capsule (25 mg) by mouth 2 times a day.    ondansetron ODT (Zofran-ODT) 4 mg disintegrating tablet; Dissolve 1 tablet (4 mg) in the mouth every 8 hours if needed for nausea or vomiting for up to 90 doses.    ketorolac (Toradol) injection 30 mg    ketorolac (Toradol) 10 mg tablet; Take 1 tablet (10 mg) by mouth every 6 hours if needed for moderate pain (4 - 6) for up to 5 days.    ubrogepant  (Ubrelvy) 100 mg tablet tablet; Take 0.5 tablets (50 mg) by mouth once daily as needed (for migraine, may repeat dose in 2 hours if needed, no more than 2 doses (100 mg) in 24 hours).    Chronic nonintractable headache, unspecified headache type    Orders:    Referral to Neurology    Nausea    Orders:    ondansetron ODT (Zofran-ODT) 4 mg disintegrating tablet; Dissolve 1 tablet (4 mg) in the mouth every 8 hours if needed for nausea or vomiting for up to 90 doses.    Medication overuse headache         *Earline has a PMH of CAD, TIA, and HTN. Triptan abortive therapy for migraines is contraindicated for the patient due to her cardiovascular conditions. Treatment with a CGRP such as Ubrelvy is the best abortive treatment option for Earline.     ASSESSMENT/PLAN:  Rich to review MRI  Start nortriptyline today  You may use your gabapentin to decrease headache frequency and to dull headache pain  Ubrelvy for migraine rescue. Take at the onset of migraine pain, repeat in 2 hours if migraine not resolved.*sample provided today.  Ondansetron for nausea  Toradol protocol today to break migraine cycle.   Follow up 6 weeks

## 2024-12-11 ENCOUNTER — APPOINTMENT (OUTPATIENT)
Dept: NEUROLOGY | Facility: CLINIC | Age: 69
End: 2024-12-11
Payer: MEDICARE

## 2024-12-11 ENCOUNTER — SPECIALTY PHARMACY (OUTPATIENT)
Dept: PHARMACY | Facility: CLINIC | Age: 69
End: 2024-12-11

## 2024-12-11 VITALS
TEMPERATURE: 97.6 F | DIASTOLIC BLOOD PRESSURE: 76 MMHG | HEART RATE: 108 BPM | SYSTOLIC BLOOD PRESSURE: 138 MMHG | RESPIRATION RATE: 18 BRPM

## 2024-12-11 DIAGNOSIS — R51.9 CHRONIC NONINTRACTABLE HEADACHE, UNSPECIFIED HEADACHE TYPE: ICD-10-CM

## 2024-12-11 DIAGNOSIS — R11.0 NAUSEA: ICD-10-CM

## 2024-12-11 DIAGNOSIS — G89.29 CHRONIC NONINTRACTABLE HEADACHE, UNSPECIFIED HEADACHE TYPE: ICD-10-CM

## 2024-12-11 DIAGNOSIS — G44.40 MEDICATION OVERUSE HEADACHE: ICD-10-CM

## 2024-12-11 DIAGNOSIS — G43.E11 INTRACTABLE CHRONIC MIGRAINE WITH AURA WITH STATUS MIGRAINOSUS: Primary | ICD-10-CM

## 2024-12-11 RX ORDER — KETOROLAC TROMETHAMINE 10 MG/1
10 TABLET, FILM COATED ORAL EVERY 6 HOURS PRN
Qty: 20 TABLET | Refills: 0 | Status: SHIPPED | OUTPATIENT
Start: 2024-12-11 | End: 2024-12-16

## 2024-12-11 RX ORDER — NORTRIPTYLINE HYDROCHLORIDE 25 MG/1
25 CAPSULE ORAL 2 TIMES DAILY
Qty: 60 CAPSULE | Refills: 2 | Status: SHIPPED | OUTPATIENT
Start: 2024-12-11 | End: 2025-03-11

## 2024-12-11 RX ORDER — ONDANSETRON 4 MG/1
4 TABLET, ORALLY DISINTEGRATING ORAL EVERY 8 HOURS PRN
Qty: 30 TABLET | Refills: 2 | Status: SHIPPED | OUTPATIENT
Start: 2024-12-11

## 2024-12-11 RX ORDER — KETOROLAC TROMETHAMINE 30 MG/ML
30 INJECTION, SOLUTION INTRAMUSCULAR; INTRAVENOUS ONCE
Status: COMPLETED | OUTPATIENT
Start: 2024-12-11 | End: 2024-12-11

## 2024-12-11 ASSESSMENT — PAIN SCALES - GENERAL: PAINLEVEL_OUTOF10: 3

## 2024-12-11 NOTE — PATIENT INSTRUCTIONS
Dear Earline,    Thank you for coming to [facility]. It was a pleasure caring for you today.  The best way to contact me for medication refills or questions about your care is through [employer].  Otherwise, you can contact the office by phone: [phone].    Ashlie Askew, RANDI-CNP    Start nortriptyline today. Take once daily for one week and then increase to twice daily.  You may use your gabapentin to decrease headache frequency and to dull headache pain  Ubrelvy for migraine rescue. Take at the onset of migraine pain, repeat in 2 hours if migraine not resolved. *sample provided today.  Ondansetron for nausea  Toradol protocol today to break migraine cycle.    You received Toradol (ketorolac) today for your severe headache.  We are going to continue with 5 days of pills starting tomorrow to break your headache cycle.  Take 1 pill with breakfast, lunch, dinner & at bedtime for 5 days.    Please take with food.    Try to avoid taking your triptan or antiinflammatory medication during this time.    If you have any nausea or diarrhea with the medication, stop & send a message via [employer], or call the next business day.     Migraines:  Suggestions for preventing or controlling migraines:  Riboflavin (vitamin B2) 200 mg twice a day may be helpful. Side effects can include diarrhea, increased urination and bright yellow urine. This should not be taken by kids.   CoQ10 100 mg daily up to three times per day may also be helpful. Side effects can include nausea, diarrhea, and dyspepsia.   Magnesium (oxelate) 400- 600 mg daily for prevention. Magnesium can also help with menstrual migraines. Side effects can include diarrhea and flushing.   According to the American Academy of Neurology, there is not sufficient evidence that melatonin helps prevent or treat migraines, so it is not currently recommended for this use. Butterbur is also not currently recommended for migraine prevention as it can  cause liver toxicity.   Avoid triggers that can cause or worsen migraines (food, lack of sleep, stress, etc.)  Headache frequency is noted to be increased in those with low physical activity, poor sleep, high BMI, smoking, and caffeine overuse. Managing stress with relaxation techniques and getting 20-30 minutes of aerobic exercise at least 4 times per week can help decrease headache frequency and intensity.   Keep a diary of your headaches to note triggers, how often you get them, how long they last, associated symptoms, what medicines you took and if they helped, and any other helpful information   Avoid taking rescue medication (NOT preventative medication) more than 3 days a week (includes both prescription and over the counter meds)  Take your preventative medication as directed. Let me know if you have side effects or problems with the medication. Do not suddenly stop the medication.    Medication Overuse Headaches:  Medication-overuse headache may occur in people who have frequent migraine, cluster, or tension-type headaches, which leads them to overuse pain medications. A vicious cycle occurs in which frequent headaches cause the person to take medication frequently (often non-prescription medication), which then causes a rebound headache as the medication wears off, causing more medication use, and so on. Medication overuse headache can even occur if a person is taking daily analgesics to treat other areas of pain, as the body does not care why the person is taking the medication. This headache from frequent medication use is a result of the body's dependence on the medication.    Medication overuse headache: Using combination analgesics, opioids or triptans >= 10 days/month, or acetaminophen/ or NSAIDs >= 15 days/month    Overuse of any number of pain medications can increase the risk of developing medication-overuse headaches. To avoid medication-overuse headaches, we recommend the following:  If  possible, avoid medications that contain butalbital (sample brand name: Fioricet) and opioids completely.  Do not use triptans or aspirin/acetaminophen/caffeine combinations (sample brand name: Excedrin) more than nine days per month.  Do not use nonsteroidal antiinflammatory drugs (NSAIDs) more than 14 days per month.    *If you are having difficulty breaking the cycle of medication overuse headaches, please message me through [employer] or call the office.        CGRP (calcitonin gene-related peptide) is a molecule in your brain that appears to be related to neurogenic inflammation and pain transmission in people who suffer from migraines.  There are 2 oral anti-CGRP medications available for acute migraine treatment: Nurtec and Ubrelvy. These are small molecule CRGP antagonists.         Nurtec is taken orally or disintegrated in mouth, 75 mg per dose, no more than one dose in 24 hours. Side effects are rare but may cause nausea.          Ubrelvy is taken orally,  mg per dose, may be repeated if still having symptoms after 2 hours. No more than 200 mg in 24 hours. This rarely causes side effects including drowsiness, nausea, or dry mouth.    If your prescription was sent to  [facility], you can contact them at [phone] to have the savings card applied to your prescription.

## 2024-12-17 ENCOUNTER — APPOINTMENT (OUTPATIENT)
Dept: PHARMACY | Facility: HOSPITAL | Age: 69
End: 2024-12-17
Payer: MEDICARE

## 2024-12-17 ENCOUNTER — SPECIALTY PHARMACY (OUTPATIENT)
Dept: PHARMACY | Facility: CLINIC | Age: 69
End: 2024-12-17

## 2024-12-17 DIAGNOSIS — E11.65 TYPE 2 DIABETES MELLITUS WITH HYPERGLYCEMIA, WITH LONG-TERM CURRENT USE OF INSULIN: ICD-10-CM

## 2024-12-17 DIAGNOSIS — Z79.4 TYPE 2 DIABETES MELLITUS WITH HYPERGLYCEMIA, WITH LONG-TERM CURRENT USE OF INSULIN: ICD-10-CM

## 2024-12-17 NOTE — PROGRESS NOTES
Clinical Pharmacy Appointment    Patient ID: Earline Villagran is a 69 y.o. female who presents for Diabetes.    Pt is here for Follow Up appointment.     Referring Provider: Patricia Calderon MD  PCP: Patricia Calderon MD   Last visit with PCP: 9/26/2024   Next visit with PCP: not scheduled      Subjective   HPI  DIABETES MELLITUS TYPE 2:    Diagnosed with diabetes: 20 years ago. Known diabetic complications: hyperglycemia.  Does patient follow with Endocrinology: Yes    Current diabetic medications include:  Mounjaro 12.5 mg; inject 12.5 mg once weekly on Saturdays  Glimepiride 4 mg; take 1 tablet BID    Clarifications to above regimen: None  Adverse Effects: None    Past diabetic medications include:  Trulicity, Ozempic, Januvia, Glipizide, Metformin    Glucose Readings:  Glucometer/CGM Type: OneTouch Ultra Glucometer  Patient tests BG 2 times per day    Current home BG readings: 196 mg/dL; 121 mg/dL; 88 mg/dL; 167 mg/dL; 225 mg/dL; 145 mg/dL; 133 mg/dL; 126 mg/dL; 87 mg/dL; 157 mg/dL; 127 mg/dL (Ave: 143 mg/dL)    Previous home BG readings:  mg/dL (Ave: 122 mg/dL)    Any episodes of hypoglycemia? No, patient has not had any episodes of hypoglycemia .  Did patient treat episode of hypoglycemia appropriately? N/A  Does the patient have a prescription for ready-to-use Glucagon? Not on insulin  Does pt have proteinuria? No    Lifestyle:  Diet: 2 meals/day.  BK: Cereal, banana  LN: Salad, meat, shrimp  Drinks: Water, sparkling water  Physical Activity: Walking, patient is trying to work out more    Secondary Prevention:  Statin? Yes  ACE-I/ARB? No  Aspirin? Yes    Pertinent PMH Review:  PMH of Pancreatitis: No  PMH of Retinopathy: No  PMH of Urinary Tract Infections: No  PMH of MTC: No    Immunizations:  Influenza? Yes  COVID? Yes    Drug Interactions  No relevant drug interactions were noted.    Medication System Management  Patient's preferred pharmacy: [id number] in [place]  "[place]  Adherence/Organization: No concerns at this time  Affordability/Accessibility: No concerns at this time      Objective   Allergies   Allergen Reactions    Raspberry Shortness of breath    Shrimp Anaphylaxis    Ciprofloxacin Rash     Itching, Itching    Clindamycin Diarrhea     Other Reaction(s): GI Upset      diarrhea, diarrhea    Green Tea Swelling     Lip swells   W green tea and neto, Lip swells   W green tea and neto    Penicillins Syncope     Other Reaction(s): Other (See Comments), Syncope, Unknown      Other reaction(s): Other (See Comments) PASSED OUT, PASSED OUT      PASSED OUT, PASSED OUT      \"Passed out\"    Sulfamethoxazole-Trimethoprim Hives, Unknown and Rash     Other Reaction(s): Unknown    Phenylephrine-Guaifenesin Palpitations     Other Reaction(s): Other: See Comments      Other reaction(s): Other: See Comments Fast heart rate      Fast heart rate     Social History     Social History Narrative    Not on file      Medication Review  Current Outpatient Medications   Medication Instructions    acetaminophen (TYLENOL) 975 mg, oral, Every 6 hours PRN    amLODIPine (NORVASC) 5 mg, oral, Daily    aspirin 81 mg EC tablet 1 tablet, Daily    atorvastatin (LIPITOR) 40 mg, oral, Daily    blood sugar diagnostic (OneTouch Ultra Test) strip TEST GLUCOSE 2 TO 3 TIMES DAILY    carvedilol (COREG) 3.125 mg, oral, 2 times daily    cyclobenzaprine (FLEXERIL) 10 mg, oral, 3 times daily PRN    estradiol (Estrace) 0.01 % (0.1 mg/gram) vaginal cream Apply 0.5g (blueberry size amount) nightly for 2 weeks, then 2 - 3 times per week    fluticasone (Flonase) 50 mcg/actuation nasal spray 2 sprays, Each Nostril, Daily    gabapentin (NEURONTIN) 100 mg, oral, 3 times daily    glimepiride (AMARYL) 4 mg, oral, 2 times daily Nitrate    hydroCHLOROthiazide (HYDRODIURIL) 25 mg, oral, Daily    hydrocortisone 1 % cream 1 Application, Topical, 2 times daily, Apply to affected area 2 times daily    hydrOXYzine HCL " (ATARAX) 10 mg, oral, Every 8 hours PRN    levothyroxine (SYNTHROID, LEVOXYL) 125 mcg, oral, Daily    loratadine 10 mg, Daily    Mounjaro 12.5 mg, subcutaneous, Weekly    nortriptyline (PAMELOR) 25 mg, oral, 2 times daily    omeprazole (PRILOSEC) 20 mg, oral, Daily    ondansetron ODT (ZOFRAN-ODT) 4 mg, oral, Every 8 hours PRN    OneTouch Delica Plus Lancet 33 gauge misc USE WITH BLOOD GLUCOSE TEST ONCE DAILY    OneTouch Ultra2 Meter misc 1 DEVICE ONCE DAILY. TEST ONE TIME A DAY. INSULIN DEP? NO E11.9 DM 2    sharps container 1 each, miscellaneous, As needed    ubrogepant (UBRELVY) 50 mg, oral, Daily PRN      Vitals  BP Readings from Last 2 Encounters:   12/11/24 138/76   12/06/24 126/79     BMI Readings from Last 1 Encounters:   12/06/24 37.94 kg/m²      Labs  A1C  Lab Results   Component Value Date    HGBA1C 11.8 (A) 09/26/2024    HGBA1C 10.6 (H) 05/19/2024    HGBA1C 8.8 (H) 12/29/2023     BMP  Lab Results   Component Value Date    CALCIUM 8.9 09/12/2024     09/12/2024    K 3.9 09/12/2024    CO2 31 09/12/2024    CL 97 (L) 09/12/2024    BUN 15 09/12/2024    CREATININE 1.11 (H) 09/12/2024    EGFR 54 (L) 09/12/2024     LFTs  Lab Results   Component Value Date    ALT 17 09/12/2024    AST 15 09/12/2024    ALKPHOS 72 09/12/2024    BILITOT 0.4 09/12/2024     FLP  Lab Results   Component Value Date    TRIG 161 (H) 12/29/2023    CHOL 222 (H) 12/29/2023    LDLF 198 (H) 08/09/2023    LDLCALC 140 (H) 12/29/2023    HDL 50.3 12/29/2023     Urine Microalbumin  Lab Results   Component Value Date    MICROALBCREA 17.5 06/04/2022     Weight Management  Wt Readings from Last 3 Encounters:   12/06/24 103 kg (228 lb)   12/03/24 104 kg (228 lb 3.2 oz)   11/20/24 104 kg (230 lb)      There is no height or weight on file to calculate BMI.     Assessment/Plan   Problem List Items Addressed This Visit       Type 2 diabetes mellitus with hyperglycemia, with long-term current use of insulin     Patient's goal A1c is < 7%.  Is pt at goal?  No, patient's most recent A1c from 9/26/2024 was 11.8%  Patient's SMBGs are variable. The average blood sugar was 143 mg/dL.     Rationale for plan: Patient has 5 doses left of the Mounjaro 12.5 mg and states that she feels that her blood sugar control is improving. Patient has also recently been ill and so her sugars have been less controlled than they typically are.    Medication Changes:  CONTINUE  Mounjaro 12.5 mg; inject 12.5 mg once weekly on Saturdays  Glimepiride 4 mg; take 1 tablet BID    Future Considerations:  Based on future BG readings, can increase Mounjaro if patient needs further blood sugar control    Monitoring and Education:  Counseled patient on the blood sugar goals including FBG <130 mg/dL; PPBG <180 mg; and A1c <7%  Answered all patient questions and concerns.         Relevant Orders    Referral to Clinical Pharmacy       Clinical Pharmacist follow-up: 1/7/2025, Telehealth visit    Continue all meds under the continuation of care with the referring provider and clinical pharmacy team.    Thank you,  Theresa Castañeda, PharmD  Clinical Pharmacist - Primary Care  [phone]  12/17/2024    Verbal consent to manage patient's drug therapy was obtained from the patient. They were informed they may decline to participate or withdraw from participation in pharmacy services at any time.

## 2024-12-17 NOTE — ASSESSMENT & PLAN NOTE
Patient's goal A1c is < 7%.  Is pt at goal? No, patient's most recent A1c from 9/26/2024 was 11.8%  Patient's SMBGs are variable. The average blood sugar was 143 mg/dL.     Rationale for plan: Patient has 5 doses left of the Mounjaro 12.5 mg and states that she feels that her blood sugar control is improving. Patient has also recently been ill and so her sugars have been less controlled than they typically are.    Medication Changes:  CONTINUE  Mounjaro 12.5 mg; inject 12.5 mg once weekly on Saturdays  Glimepiride 4 mg; take 1 tablet BID    Future Considerations:  Based on future BG readings, can increase Mounjaro if patient needs further blood sugar control    Monitoring and Education:  Counseled patient on the blood sugar goals including FBG <130 mg/dL; PPBG <180 mg; and A1c <7%  Answered all patient questions and concerns.

## 2025-01-03 DIAGNOSIS — G43.E11 INTRACTABLE CHRONIC MIGRAINE WITH AURA WITH STATUS MIGRAINOSUS: ICD-10-CM

## 2025-01-03 RX ORDER — NORTRIPTYLINE HYDROCHLORIDE 25 MG/1
25 CAPSULE ORAL 2 TIMES DAILY
Qty: 180 CAPSULE | Refills: 3 | Status: SHIPPED | OUTPATIENT
Start: 2025-01-03 | End: 2025-12-29

## 2025-01-07 ENCOUNTER — APPOINTMENT (OUTPATIENT)
Dept: PHARMACY | Facility: HOSPITAL | Age: 70
End: 2025-01-07
Payer: MEDICARE

## 2025-01-07 DIAGNOSIS — Z79.4 TYPE 2 DIABETES MELLITUS WITH HYPERGLYCEMIA, WITH LONG-TERM CURRENT USE OF INSULIN: ICD-10-CM

## 2025-01-07 DIAGNOSIS — E11.65 TYPE 2 DIABETES MELLITUS WITH HYPERGLYCEMIA, WITH LONG-TERM CURRENT USE OF INSULIN: ICD-10-CM

## 2025-01-07 NOTE — ASSESSMENT & PLAN NOTE
Patient's goal A1c is < 7%.  Is pt at goal? No, patient's most recent A1c from 9/26/2024 was 11.8%  Patient's SMBGs are at goal.  FBG around 100 mg/dL; PPBG around 150 mg/dL.  Rationale for plan: patient is currently well controlled with her current regimen.    Medication Changes:  CONTINUE  Mounjaro 12.5 mg; inject 12.5 mg once weekly on Saturdays  Glimepiride 4 mg; take 1 tablet BID    Future Considerations:  Patient is due for a new A1c  Based on new A1c and SMBG readings, can adjust medications as needed    Monitoring and Education:  Counseled patient on medications  Answered all patient questions and concerns

## 2025-01-07 NOTE — PROGRESS NOTES
Clinical Pharmacy Appointment    Patient ID: Earline Villagran is a 69 y.o. female who presents for Diabetes.    Pt is here for Follow Up appointment.     Referring Provider: Patricia Calderon MD  PCP: Patricia Calderon MD   Last visit with PCP: 2024   Next visit with PCP: 2025      Subjective   HPI  DIABETES MELLITUS TYPE 2:    Diagnosed with diabetes: 20 years ago. Known diabetic complications: hyperglycemia.  Does patient follow with Endocrinology: Yes  Last optometry exam: patient  Most recent visit in Podiatry: 2025-- patient denies sores or cuts on feet today      Current diabetic medications include:  Mounjaro 12.5 mg; inject 12.5 mg once weekly on   Glimepiride 4 mg; take 1 tablet BID    Clarifications to above regimen: None  Adverse Effects: None    Past diabetic medications include:  Trulicity, Ozempic, Januvia, Glipizide, Metformin    Glucose Readings:  Glucometer/CGM Type: OneTouch Ultra Glucometer  Patient tests BG 2 times per day    Current home BG readings (mg/dL): FB mg/dL; PPBG 150   Previous home BG readings (mg/dL):  mg/dL (Ave: 143 mg/dL)    Any episodes of hypoglycemia? No, patient did not report any incidence of hypoglycemia .  Did patient treat episode of hypoglycemia appropriately? N/A  Does the patient have a prescription for ready-to-use Glucagon? Not on insulin  Does pt have proteinuria? No    Lifestyle:  Diet: 2 meals/day.   BK: Cereal, banana  LN: Salad, meat, shrimp  Drinks: Water, sparkling water  Physical Activity: Walking, patient is trying to work out more    Secondary Prevention:  Statin? Yes  ACE-I/ARB? No  Aspirin? Yes    Pertinent PMH Review:  PMH of Pancreatitis: No  PMH of Retinopathy: No  PMH of Urinary Tract Infections: No  PMH of MTC: No    Immunizations:  Influenza? Yes  COVID? Yes  Pneumonia? Most recent is Prevnar 13 from 2021    Drug Interactions  No relevant drug interactions were noted.    Medication System  "Management  Patient's preferred pharmacy: [id number] in [place]  Adherence/Organization: No concerns at this time  Affordability/Accessibility: No concerns at this time      Objective   Allergies   Allergen Reactions    Raspberry Shortness of breath    Shrimp Anaphylaxis    Ciprofloxacin Rash     Itching, Itching    Clindamycin Diarrhea     Other Reaction(s): GI Upset      diarrhea, diarrhea    Green Tea Swelling     Lip swells   W green tea and neto, Lip swells   W green tea and neto    Penicillins Syncope     Other Reaction(s): Other (See Comments), Syncope, Unknown      Other reaction(s): Other (See Comments) PASSED OUT, PASSED OUT      PASSED OUT, PASSED OUT      \"Passed out\"    Sulfamethoxazole-Trimethoprim Hives, Unknown and Rash     Other Reaction(s): Unknown    Phenylephrine-Guaifenesin Palpitations     Other Reaction(s): Other: See Comments      Other reaction(s): Other: See Comments Fast heart rate      Fast heart rate     Social History     Social History Narrative    Not on file      Medication Review  Current Outpatient Medications   Medication Instructions    acetaminophen (TYLENOL) 975 mg, oral, Every 6 hours PRN    amLODIPine (NORVASC) 5 mg, oral, Daily    aspirin 81 mg EC tablet 1 tablet, Daily    atorvastatin (LIPITOR) 40 mg, oral, Daily    blood sugar diagnostic (OneTouch Ultra Test) strip TEST GLUCOSE 2 TO 3 TIMES DAILY    carvedilol (COREG) 3.125 mg, oral, 2 times daily    cyclobenzaprine (FLEXERIL) 10 mg, oral, 3 times daily PRN    estradiol (Estrace) 0.01 % (0.1 mg/gram) vaginal cream Apply 0.5g (blueberry size amount) nightly for 2 weeks, then 2 - 3 times per week    fluticasone (Flonase) 50 mcg/actuation nasal spray 2 sprays, Each Nostril, Daily    gabapentin (NEURONTIN) 100 mg, oral, 3 times daily    glimepiride (AMARYL) 4 mg, oral, 2 times daily Nitrate    hydroCHLOROthiazide (HYDRODIURIL) 25 mg, oral, Daily    hydrOXYzine HCL (ATARAX) 10 mg, oral, Every 8 hours " PRN    levothyroxine (SYNTHROID, LEVOXYL) 125 mcg, oral, Daily    loratadine 10 mg, Daily    Mounjaro 12.5 mg/0.5 mL pen injector subcutaneous, Once Weekly, Inject contents of (1) pen subcutaneously once a week as directed.    nortriptyline (PAMELOR) 25 mg, oral, 2 times daily    omeprazole (PRILOSEC) 20 mg, oral, Daily    ondansetron ODT (ZOFRAN-ODT) 4 mg, oral, Every 8 hours PRN    OneTouch Delica Plus Lancet 33 gauge misc USE WITH BLOOD GLUCOSE TEST ONCE DAILY    OneTouch Ultra2 Meter misc 1 DEVICE ONCE DAILY. TEST ONE TIME A DAY. INSULIN DEP? NO E11.9 DM 2    sharps container 1 each, miscellaneous, As needed    ubrogepant (UBRELVY) 50 mg, oral, Daily PRN      Vitals  BP Readings from Last 2 Encounters:   12/11/24 138/76   12/06/24 126/79     BMI Readings from Last 1 Encounters:   12/06/24 37.94 kg/m²      Labs  A1C  Lab Results   Component Value Date    HGBA1C 11.8 (A) 09/26/2024    HGBA1C 10.6 (H) 05/19/2024    HGBA1C 8.8 (H) 12/29/2023     BMP  Lab Results   Component Value Date    CALCIUM 8.9 09/12/2024     09/12/2024    K 3.9 09/12/2024    CO2 31 09/12/2024    CL 97 (L) 09/12/2024    BUN 15 09/12/2024    CREATININE 1.11 (H) 09/12/2024    EGFR 54 (L) 09/12/2024     LFTs  Lab Results   Component Value Date    ALT 17 09/12/2024    AST 15 09/12/2024    ALKPHOS 72 09/12/2024    BILITOT 0.4 09/12/2024     FLP  Lab Results   Component Value Date    TRIG 161 (H) 12/29/2023    CHOL 222 (H) 12/29/2023    LDLF 198 (H) 08/09/2023    LDLCALC 140 (H) 12/29/2023    HDL 50.3 12/29/2023     Urine Microalbumin  Lab Results   Component Value Date    MICROALBCREA 17.5 06/04/2022     Weight Management  Wt Readings from Last 3 Encounters:   12/06/24 103 kg (228 lb)   12/03/24 104 kg (228 lb 3.2 oz)   11/20/24 104 kg (230 lb)      There is no height or weight on file to calculate BMI.     Assessment/Plan   Problem List Items Addressed This Visit       Type 2 diabetes mellitus with hyperglycemia, with long-term current use of  insulin     Patient's goal A1c is < 7%.  Is pt at goal? No, patient's most recent A1c from 9/26/2024 was 11.8%  Patient's SMBGs are at goal.  FBG around 100 mg/dL; PPBG around 150 mg/dL.  Rationale for plan: patient is currently well controlled with her current regimen.    Medication Changes:  CONTINUE  Mounjaro 12.5 mg; inject 12.5 mg once weekly on Saturdays  Glimepiride 4 mg; take 1 tablet BID    Future Considerations:  Patient is due for a new A1c  Based on new A1c and SMBG readings, can adjust medications as needed    Monitoring and Education:  Counseled patient on medications  Answered all patient questions and concerns            Clinical Pharmacist follow-up: 1/28/2025, Telehealth visit    Continue all meds under the continuation of care with the referring provider and clinical pharmacy team.    Thank you,  Theresa Castañeda, PharmD  Clinical Pharmacist - Primary Care  [phone]  1/7/2025    Verbal consent to manage patient's drug therapy was obtained from the patient. They were informed they may decline to participate or withdraw from participation in pharmacy services at any time.

## 2025-01-15 ENCOUNTER — APPOINTMENT (OUTPATIENT)
Dept: PRIMARY CARE | Facility: CLINIC | Age: 70
End: 2025-01-15
Payer: MEDICARE

## 2025-01-15 DIAGNOSIS — G43.E11 INTRACTABLE CHRONIC MIGRAINE WITH AURA WITH STATUS MIGRAINOSUS: ICD-10-CM

## 2025-01-15 NOTE — TELEPHONE ENCOUNTER
Patient message;  Can you call [facility] for a refill please thank you     I let patient know that it was ordered on 12/12/24 with 11 refills and she should reach out to the pharmacy.   She is asking for it to go [facility] but it was sent to [facility]. Will clarify with patient and resend to [facility] if needed.

## 2025-01-16 RX ORDER — UBROGEPANT 50 MG/1
50 TABLET ORAL DAILY PRN
Qty: 16 TABLET | Refills: 11 | Status: SHIPPED | OUTPATIENT
Start: 2025-01-16

## 2025-01-16 NOTE — TELEPHONE ENCOUNTER
Earline's [employer] message:    Yes I would like it to go to [facility]         Sending script to [facility].

## 2025-01-21 ENCOUNTER — APPOINTMENT (OUTPATIENT)
Dept: NEUROLOGY | Facility: CLINIC | Age: 70
End: 2025-01-21
Payer: MEDICARE

## 2025-01-21 DIAGNOSIS — R11.0 NAUSEA: ICD-10-CM

## 2025-01-21 DIAGNOSIS — G43.E11 INTRACTABLE CHRONIC MIGRAINE WITH AURA WITH STATUS MIGRAINOSUS: Primary | ICD-10-CM

## 2025-01-21 PROCEDURE — 1123F ACP DISCUSS/DSCN MKR DOCD: CPT

## 2025-01-21 PROCEDURE — 99213 OFFICE O/P EST LOW 20 MIN: CPT

## 2025-01-21 PROCEDURE — 1159F MED LIST DOCD IN RCRD: CPT

## 2025-01-21 NOTE — PROGRESS NOTES
Chief Complaint   Patient presents with    Follow-up    Migraine     Subjective   HPI  Earline Villagran is a 69 y.o. year old female who presents with chief complaint Intractable chronic migraine with aura with status migrainosus [G43.E11].    Earline is experiencing 4 migraines per month.   Was getting them every other day now currently down to once every week or so since starting Nortriptyline.   Triggers include caffeine, bright lights, loud noises, possibly with changes in barometric pressure.   Gets lines and spots in vision before headache starts. Bright spots, zig zag lines in visual field start and then the headache follows. The visual disturbances remain. One time had a loss of vision, only saw white; lasted about 15 minutes, then subsided.   The headaches are usually throbbing and are located left side of head, towards the back, unilateral.   Started the Nortriptyline and has noticed a difference- has decreased headache days.    Associated photophobia and phonophobia, vestibular symptoms/ unsteady on feet during headache, nausea; occasional vomiting Zofran helpful).  Headaches are worsened with exertion. No change in character with sneezing, coughing. Bending over causes increased dizziness. Laying down improves the headache.    Treats with Ubrelvy, effective within 30 minutes, takes migraine away 100%.  Caffeine: None, can trigger her headaches. Trying to drink water, but difficult.   Sleep: sometimes has trouble falling a sleep. Sleeps about 6 hours per night. Wakes through the night (sometimes to urinate, can't get back to sleep). Endorses snoring. Does not wake feeling rested.   The patient denies the presence of any associated double vision, speech problems, confusion, facial or extremity weakness or numbness or problems with coordination. Denies other neurological history of seizures, stroke, or TIA.    Current/ past HA treatments:  Preventive:  Nortriptyline  Gabapentin- for nerve pain from the  diabetes (per patient- taking PRN)  Cyclobenzaprine- has not taken in a long time    Rescue:  Toradol protocol was helpful 11/2024  [facility] took head pain away, but made stomach painful   Ibuprofen 600 mg- was okay  PM Tylenol somewhat helpful    Current Outpatient Medications:     amLODIPine (Norvasc) 5 mg tablet, Take 1 tablet (5 mg) by mouth once daily., Disp: 90 tablet, Rfl: 1    aspirin 81 mg EC tablet, Take 1 tablet (81 mg) by mouth once daily., Disp: , Rfl:     blood sugar diagnostic (OneTouch Ultra Test) strip, TEST GLUCOSE 2 TO 3 TIMES DAILY, Disp: 300 strip, Rfl: 3    carvedilol (Coreg) 3.125 mg tablet, Take 1 tablet (3.125 mg) by mouth 2 times a day., Disp: 180 tablet, Rfl: 1    estradiol (Estrace) 0.01 % (0.1 mg/gram) vaginal cream, Apply 0.5g (blueberry size amount) nightly for 2 weeks, then 2 - 3 times per week, Disp: 42.5 g, Rfl: 11    fluticasone (Flonase) 50 mcg/actuation nasal spray, Administer 2 sprays into each nostril once daily., Disp: 48 mL, Rfl: 1    gabapentin (Neurontin) 100 mg capsule, Take 1 capsule (100 mg) by mouth 3 times a day., Disp: 270 capsule, Rfl: 1    glimepiride (AmaryL) 4 mg tablet, Take 1 tablet (4 mg) by mouth in the morning and at bedtime., Disp: 180 tablet, Rfl: 1    hydroCHLOROthiazide (HYDRODiuril) 25 mg tablet, Take 1 tablet (25 mg) by mouth once daily., Disp: 90 tablet, Rfl: 1    hydrOXYzine HCL (Atarax) 10 mg tablet, Take 1 tablet (10 mg) by mouth every 8 hours if needed for itching., Disp: 270 tablet, Rfl: 0    levothyroxine (Synthroid, Levoxyl) 125 mcg tablet, Take 1 tablet (125 mcg) by mouth once daily., Disp: 90 tablet, Rfl: 0    loratadine 10 mg capsule, Take 10 mg by mouth once daily., Disp: , Rfl:     nortriptyline (Pamelor) 25 mg capsule, TAKE 1 CAPSULE (25 MG) BY MOUTH 2 TIMES A DAY., Disp: 180 capsule, Rfl: 3    omeprazole (PriLOSEC) 20 mg tablet,delayed release (DR/EC) EC tablet, Take 1 tablet (20 mg) by mouth once daily., Disp: 90 tablet, Rfl: 1     "ondansetron ODT (Zofran-ODT) 4 mg disintegrating tablet, Dissolve 1 tablet (4 mg) in the mouth every 8 hours if needed for nausea or vomiting for up to 90 doses., Disp: 30 tablet, Rfl: 2    OneTouch Delica Plus Lancet 33 gauge misc, USE WITH BLOOD GLUCOSE TEST ONCE DAILY, Disp: , Rfl:     OneTouch Ultra2 Meter misc, 1 DEVICE ONCE DAILY. TEST ONE TIME A DAY. INSULIN DEP? NO E11.9 DM 2, Disp: , Rfl:     tirzepatide (Mounjaro) 12.5 mg/0.5 mL pen injector, Inject 12.5 mg under the skin 1 (one) time per week. Inject contents of (1) pen subcutaneously once a week as directed., Disp: 6 mL, Rfl: 2    ubrogepant (Ubrelvy) 50 mg tablet, Take 1 tablet (50 mg) by mouth once daily as needed (for migraine, may repeat dose in 2 hours if needed, no more than 2 doses (100 mg) in 24 hours) for up to 192 doses., Disp: 16 tablet, Rfl: 11    acetaminophen (Tylenol) 325 mg tablet, Take 3 tablets (975 mg) by mouth every 6 hours if needed for moderate pain (4 - 6) or mild pain (1 - 3). (Patient not taking: Reported on 1/21/2025), Disp: 30 tablet, Rfl: 0    atorvastatin (Lipitor) 40 mg tablet, Take 1 tablet (40 mg) by mouth once daily. (Patient not taking: Reported on 1/21/2025), Disp: 90 tablet, Rfl: 2    sharps container, 1 each if needed (For Mounjaro pen disposal). (Patient not taking: Reported on 12/6/2024), Disp: 1 each, Rfl: 0    Allergies   Allergen Reactions    Raspberry Shortness of breath    Shrimp Anaphylaxis    Ciprofloxacin Rash     Itching, Itching    Clindamycin Diarrhea     Other Reaction(s): GI Upset      diarrhea, diarrhea    Green Tea Swelling     Lip swells   W green tea and neto, Lip swells   W green tea and neto    Penicillins Syncope     Other Reaction(s): Other (See Comments), Syncope, Unknown      Other reaction(s): Other (See Comments) PASSED OUT, PASSED OUT      PASSED OUT, PASSED OUT      \"Passed out\"    Sulfamethoxazole-Trimethoprim Hives, Unknown and Rash     Other Reaction(s): Unknown    " Phenylephrine-Guaifenesin Palpitations     Other Reaction(s): Other: See Comments      Other reaction(s): Other: See Comments Fast heart rate      Fast heart rate     Social History     Tobacco Use    Smoking status: Former     Current packs/day: 0.00     Average packs/day: 0.3 packs/day for 15.0 years (3.8 ttl pk-yrs)     Types: Cigarettes     Quit date: 10/19/1999     Years since quittin.2     Passive exposure: Never    Smokeless tobacco: Never    Tobacco comments:     Over 25 years ago   Substance Use Topics    Alcohol use: Not Currently     Comment: Once a month maybe     Social History     Substance and Sexual Activity   Drug Use Never      ROS  As noted in HPI, otherwise all other systems have been reviewed are negative for complaint.     Objective   General Appearance: Earline is well-developed, well-nourished, 69 y.o. year old female, in no acute distress. Makes good eye contact, is alert, interactive, and cooperative. Demonstrates recent & remote memory recall. Subjective information consistent with objective assessment. *Assessment limited due to virtual visit.    Lab Results   Component Value Date    WBC 7.6 2024    RBC 4.46 2024    HGB 13.2 2024    HCT 39.9 2024     2024     2024    K 3.9 2024    CL 97 (L) 2024    BUN 15 2024    CREATININE 1.11 (H) 2024    EGFR 54 (L) 2024    CALCIUM 8.9 2024    ALKPHOS 72 2024    AST 15 2024    ALT 17 2024    MG 1.83 2024    HGBA1C 11.8 (A) 2024    LDLCALC 140 (H) 2023    CHOL 222 (H) 2023    HDL 50.3 2023    TRIG 161 (H) 2023    TSH 7.03 (H) 2024      Neurological Exam  Cranial Nerves  CN III, IV, VI: Extraocular movements intact bilaterally. Normal lids and orbits bilaterally.  CN VII: Full and symmetric facial movement.  CN VIII: Hearing is normal.  Assessment & Plan  Intractable chronic migraine with aura with status  migrainosus         Nausea          ASSESSMENT/PLAN:  Out of Ubrelvy- waiting for optum to send. Can  sample if needed- remember that you may repeat the Ubrelvy at 2 hours if you are unable to break the migraine with 1 Ubrelvy.  Continue current medication regimen.  Follow up in 6 months, sooner if needed.

## 2025-01-23 ENCOUNTER — APPOINTMENT (OUTPATIENT)
Dept: NEUROLOGY | Facility: CLINIC | Age: 70
End: 2025-01-23
Payer: MEDICARE

## 2025-01-27 NOTE — PATIENT INSTRUCTIONS
Earline    Thank you for attending your virtual visit today with [facility]. It was a pleasure caring for you today. The best way to contact me for medication refills or questions about your care is through [employer]. Otherwise, you can contact the office by phone: [phone].    Ashlie Askew, RANDI-CNP

## 2025-01-28 ENCOUNTER — APPOINTMENT (OUTPATIENT)
Dept: PHARMACY | Facility: HOSPITAL | Age: 70
End: 2025-01-28
Payer: MEDICARE

## 2025-01-28 DIAGNOSIS — Z79.4 TYPE 2 DIABETES MELLITUS WITH HYPERGLYCEMIA, WITH LONG-TERM CURRENT USE OF INSULIN: ICD-10-CM

## 2025-01-28 DIAGNOSIS — E11.65 TYPE 2 DIABETES MELLITUS WITH HYPERGLYCEMIA, WITH LONG-TERM CURRENT USE OF INSULIN: ICD-10-CM

## 2025-01-28 NOTE — PROGRESS NOTES
Clinical Pharmacy Appointment    Patient ID: Earline Villagran is a 69 y.o. female who presents for Diabetes.    Pt is here for Follow Up appointment.     Referring Provider: Patricia Calderon MD  PCP: Patricia Calderon MD   Last visit with PCP: 2024   Next visit with PCP: 2025      Subjective   HPI  DIABETES MELLITUS TYPE 2:    Diagnosed with diabetes:  20 years ago. Known diabetic complications: hyperglycemia.  Does patient follow with Endocrinology: Yes  Last optometry exam: patient needs updated appointment  Most recent visit in Podiatry: patient denies sores or cuts on feet today      Current diabetic medications include:  Mounjaro 12.5 mg; inject 12.5 mg once weekly on   Glimepiride 4 mg; take 1 tablet BID    Clarifications to above regimen: None  Adverse Effects: Constipation    Past diabetic medications include:  Trulicity, Ozempic, Januvia, Glipizide, Metformin    Glucose Readings:  Glucometer/CGM Type: OneTouch Ultra Glucometer  Patient tests BG 2 times per day    Current home BG readings (mg/dL): FB mg/dL; PPBG 150 mg/dL   Previous home BG readings (mg/dL): FB mg/dL; PPB mg/dL    Any episodes of hypoglycemia? No, patient states that the lowest was around 90 mg/dL .  Did patient treat episode of hypoglycemia appropriately? N/A  Does the patient have a prescription for ready-to-use Glucagon? Not on insulin    Lifestyle:  Diet: 2 meals/day.   BK: Cereal, banana  LN: Salad, meat, shrimp  Drinks: Water, sparkling water  Physical Activity: Walking, patient is trying to work out more  Tobacco history: Former smoker    Secondary Prevention:  Statin? Yes  ACE-I/ARB? No  Aspirin? Yes    Pertinent PMH Review:  PMH of Pancreatitis: No  PMH of Retinopathy: No  PMH of Urinary Tract Infections: No  PMH of MTC: No  UACR/EGFR in last year?: No  Albumin/Creatine Ratio   Date Value Ref Range Status   2022 17.5 0.0 - 30.0 ug/mg crt Final     Immunizations:  Influenza? Yes  COVID?  "Yes  Pneumonia? Most recent is Prevnar 13 from 8/24/2021    Drug Interactions  No relevant drug interactions were noted.    Medication System Management  Patient's preferred pharmacy: [id number] in [place]  Adherence/Organization: No concerns at this time  Affordability/Accessibility: No concerns at this time      Objective   Allergies   Allergen Reactions    Raspberry Shortness of breath    Shrimp Anaphylaxis    Ciprofloxacin Rash     Itching, Itching    Clindamycin Diarrhea     Other Reaction(s): GI Upset      diarrhea, diarrhea    Green Tea Swelling     Lip swells   W green tea and neto, Lip swells   W green tea and neto    Penicillins Syncope     Other Reaction(s): Other (See Comments), Syncope, Unknown      Other reaction(s): Other (See Comments) PASSED OUT, PASSED OUT      PASSED OUT, PASSED OUT      \"Passed out\"    Sulfamethoxazole-Trimethoprim Hives, Unknown and Rash     Other Reaction(s): Unknown    Phenylephrine-Guaifenesin Palpitations     Other Reaction(s): Other: See Comments      Other reaction(s): Other: See Comments Fast heart rate      Fast heart rate     Social History     Social History Narrative    Not on file      Medication Review  Current Outpatient Medications   Medication Instructions    acetaminophen (TYLENOL) 975 mg, oral, Every 6 hours PRN    amLODIPine (NORVASC) 5 mg, oral, Daily    aspirin 81 mg EC tablet 1 tablet, Daily    atorvastatin (LIPITOR) 40 mg, oral, Daily    blood sugar diagnostic (OneTouch Ultra Test) strip TEST GLUCOSE 2 TO 3 TIMES DAILY    carvedilol (COREG) 3.125 mg, oral, 2 times daily    estradiol (Estrace) 0.01 % (0.1 mg/gram) vaginal cream Apply 0.5g (blueberry size amount) nightly for 2 weeks, then 2 - 3 times per week    fluticasone (Flonase) 50 mcg/actuation nasal spray 2 sprays, Each Nostril, Daily    gabapentin (NEURONTIN) 100 mg, oral, 3 times daily    glimepiride (AMARYL) 4 mg, oral, 2 times daily Nitrate    hydroCHLOROthiazide " (HYDRODIURIL) 25 mg, oral, Daily    hydrOXYzine HCL (ATARAX) 10 mg, oral, Every 8 hours PRN    levothyroxine (SYNTHROID, LEVOXYL) 125 mcg, oral, Daily    loratadine 10 mg, Daily    Mounjaro 12.5 mg, subcutaneous, Once Weekly, Inject contents of (1) pen subcutaneously once a week as directed.    nortriptyline (PAMELOR) 25 mg, oral, 2 times daily    omeprazole (PRILOSEC) 20 mg, oral, Daily    ondansetron ODT (ZOFRAN-ODT) 4 mg, oral, Every 8 hours PRN    OneTouch Delica Plus Lancet 33 gauge misc USE WITH BLOOD GLUCOSE TEST ONCE DAILY    OneTouch Ultra2 Meter misc 1 DEVICE ONCE DAILY. TEST ONE TIME A DAY. INSULIN DEP? NO E11.9 DM 2    sharps container 1 each, miscellaneous, As needed    Ubrelvy 50 mg, oral, Daily PRN      Vitals  BP Readings from Last 2 Encounters:   12/11/24 138/76   12/06/24 126/79     BMI Readings from Last 1 Encounters:   12/06/24 37.94 kg/m²      Labs  A1C  Lab Results   Component Value Date    HGBA1C 11.8 (A) 09/26/2024    HGBA1C 10.6 (H) 05/19/2024    HGBA1C 8.8 (H) 12/29/2023     BMP  Lab Results   Component Value Date    CALCIUM 8.9 09/12/2024     09/12/2024    K 3.9 09/12/2024    CO2 31 09/12/2024    CL 97 (L) 09/12/2024    BUN 15 09/12/2024    CREATININE 1.11 (H) 09/12/2024    EGFR 54 (L) 09/12/2024     LFTs  Lab Results   Component Value Date    ALT 17 09/12/2024    AST 15 09/12/2024    ALKPHOS 72 09/12/2024    BILITOT 0.4 09/12/2024     FLP  Lab Results   Component Value Date    TRIG 161 (H) 12/29/2023    CHOL 222 (H) 12/29/2023    LDLF 198 (H) 08/09/2023    LDLCALC 140 (H) 12/29/2023    HDL 50.3 12/29/2023     Urine Microalbumin  Lab Results   Component Value Date    MICROALBCREA 17.5 06/04/2022     Weight Management  Wt Readings from Last 3 Encounters:   12/06/24 103 kg (228 lb)   12/03/24 104 kg (228 lb 3.2 oz)   11/20/24 104 kg (230 lb)      There is no height or weight on file to calculate BMI.     Assessment/Plan   Problem List Items Addressed This Visit       Type 2 diabetes  mellitus with hyperglycemia, with long-term current use of insulin     Patient's goal A1c is < 7%.  Is pt at goal? No, patient's most recent A1c from 9/26/2024 was 11.8%  Patient's SMBGs are at goal. FBG around 100 mg/dL and PPBG around 150 mg/dL.     Rationale for plan: Patient is currently well controlled with her current diabetes regimen.    Medication Changes:  CONTINUE  Mounjaro 12.5 mg; inject 12.5 mg once weekly on Saturdays  Glimepiride 4 mg; take 1 tablet BID    Future Considerations:  Patient is due for a new A1c  If patient starts experiencing any hypoglycemia, can discontinue glimepiride    Monitoring and Education:  Counseled patient on medications  Answered all patient questions and concerns            Clinical Pharmacist follow-up: 2/25/2025, Telehealth visit    Continue all meds under the continuation of care with the referring provider and clinical pharmacy team.    Thank you,  Theresa Castañeda, PharmD  Clinical Pharmacist - Primary Care  [phone]  1/28/2025    Verbal consent to manage patient's drug therapy was obtained from the patient. They were informed they may decline to participate or withdraw from participation in pharmacy services at any time.

## 2025-01-28 NOTE — ASSESSMENT & PLAN NOTE
Patient's goal A1c is < 7%.  Is pt at goal? No, patient's most recent A1c from 9/26/2024 was 11.8%  Patient's SMBGs are at goal. FBG around 100 mg/dL and PPBG around 150 mg/dL.     Rationale for plan: Patient is currently well controlled with her current diabetes regimen.    Medication Changes:  CONTINUE  Mounjaro 12.5 mg; inject 12.5 mg once weekly on Saturdays  Glimepiride 4 mg; take 1 tablet BID    Future Considerations:  Patient is due for a new A1c  If patient starts experiencing any hypoglycemia, can discontinue glimepiride    Monitoring and Education:  Counseled patient on medications  Answered all patient questions and concerns

## 2025-02-02 DIAGNOSIS — N18.30 CKD STAGE 3 DUE TO TYPE 2 DIABETES MELLITUS (MULTI): ICD-10-CM

## 2025-02-02 DIAGNOSIS — E11.22 CKD STAGE 3 DUE TO TYPE 2 DIABETES MELLITUS (MULTI): ICD-10-CM

## 2025-02-02 RX ORDER — BLOOD SUGAR DIAGNOSTIC
STRIP MISCELLANEOUS
Qty: 300 STRIP | Refills: 3 | Status: SHIPPED | OUTPATIENT
Start: 2025-02-02

## 2025-02-03 ENCOUNTER — OFFICE VISIT (OUTPATIENT)
Dept: URGENT CARE | Age: 70
End: 2025-02-03
Payer: MEDICARE

## 2025-02-03 VITALS
OXYGEN SATURATION: 98 % | HEIGHT: 65 IN | WEIGHT: 228 LBS | TEMPERATURE: 97.7 F | DIASTOLIC BLOOD PRESSURE: 84 MMHG | RESPIRATION RATE: 22 BRPM | SYSTOLIC BLOOD PRESSURE: 139 MMHG | HEART RATE: 116 BPM | BODY MASS INDEX: 37.99 KG/M2

## 2025-02-03 DIAGNOSIS — R50.9 FEVER, UNSPECIFIED FEVER CAUSE: Primary | ICD-10-CM

## 2025-02-03 DIAGNOSIS — J40 BRONCHITIS: ICD-10-CM

## 2025-02-03 LAB
POC RAPID INFLUENZA A: NEGATIVE
POC RAPID INFLUENZA B: NEGATIVE
POC SARS-COV-2 AG BINAX: NORMAL

## 2025-02-03 RX ORDER — IPRATROPIUM BROMIDE AND ALBUTEROL SULFATE 2.5; .5 MG/3ML; MG/3ML
3 SOLUTION RESPIRATORY (INHALATION) ONCE
Status: COMPLETED | OUTPATIENT
Start: 2025-02-03 | End: 2025-02-03

## 2025-02-03 RX ORDER — AZITHROMYCIN 250 MG/1
TABLET, FILM COATED ORAL
Qty: 6 TABLET | Refills: 0 | Status: SHIPPED | OUTPATIENT
Start: 2025-02-03

## 2025-02-03 RX ORDER — ALBUTEROL SULFATE 90 UG/1
2 INHALANT RESPIRATORY (INHALATION) EVERY 6 HOURS PRN
Qty: 18 G | Refills: 0 | Status: SHIPPED | OUTPATIENT
Start: 2025-02-03 | End: 2026-02-03

## 2025-02-03 RX ORDER — PREDNISONE 20 MG/1
20 TABLET ORAL DAILY
Qty: 5 TABLET | Refills: 0 | Status: SHIPPED | OUTPATIENT
Start: 2025-02-03 | End: 2025-02-08

## 2025-02-03 RX ADMIN — IPRATROPIUM BROMIDE AND ALBUTEROL SULFATE 3 ML: 2.5; .5 SOLUTION RESPIRATORY (INHALATION) at 10:52

## 2025-02-03 ASSESSMENT — ENCOUNTER SYMPTOMS
FEVER: 1
SHORTNESS OF BREATH: 1
COUGH: 1
FATIGUE: 1

## 2025-02-03 NOTE — LETTER
February 3, 2025     Patient: Earline Villagran   YOB: 1955   Date of Visit: 2/3/2025       To Whom It May Concern:    Earline Villagran was seen in my clinic on 2/3/2025 at 10:15 am. Please excuse Earline for her absence from work on this day to make the appointment.    If you have any questions or concerns, please don't hesitate to call.     She may return to work wed 2/5    Sincerely,         Katt Ram MD        CC: No Recipients

## 2025-02-03 NOTE — PROGRESS NOTES
Subjective   Patient ID: Earline Villagran is a 69 y.o. female. They present today with a chief complaint of Cough (4x days/With chest congestion), Nasal Congestion, Headache, and Fever.    History of Present Illness  Pt presents with cough fever and generally not feeling well  She is flu and covid negative          Past Medical History  Allergies as of 02/03/2025 - Reviewed 02/03/2025   Allergen Reaction Noted    Raspberry Shortness of breath 09/12/2024    Shrimp Anaphylaxis 08/07/2023    Ciprofloxacin Rash 07/16/2009    Clindamycin Diarrhea 01/12/2013    Green tea Swelling 05/23/2012    Penicillins Syncope 02/23/2006    Sulfamethoxazole-trimethoprim Hives, Unknown, and Rash 02/23/2006    Phenylephrine-guaifenesin Palpitations 11/18/2013       (Not in a hospital admission)       Past Medical History:   Diagnosis Date    Allergic     Arthritis     Concussion with loss of consciousness of 30 minutes or less, subsequent encounter 11/29/2021    Concussion with loss of consciousness of 30 minutes or less, subsequent encounter    Diabetes mellitus (Multi)     GERD (gastroesophageal reflux disease)     Herpes zoster 05/19/2024    Hypertension     Personal history of other diseases of the circulatory system     History of hypertension    Personal history of other specified conditions     History of seizure    Personal history of other specified conditions 12/12/2020    History of vertigo    Seizures (Multi)     Stroke (Multi) 8/1976       Past Surgical History:   Procedure Laterality Date    HERNIA REPAIR  Baby    HYSTERECTOMY  Feb22    OTHER SURGICAL HISTORY  01/02/2021    Subtotal thyroidectomy        reports that she quit smoking about 25 years ago. Her smoking use included cigarettes. She has a 3.8 pack-year smoking history. She has never been exposed to tobacco smoke. She has never used smokeless tobacco. She reports that she does not currently use alcohol. She reports that she does not use drugs.    Review of  "Systems  Review of Systems   Constitutional:  Positive for fatigue and fever.   Respiratory:  Positive for cough and shortness of breath.                                   Objective    Vitals:    02/03/25 1014   BP: 139/84   Pulse: (!) 116   Resp: 22   Temp: 36.5 °C (97.7 °F)   TempSrc: Oral   SpO2: 98%   Weight: 103 kg (228 lb)   Height: 1.651 m (5' 5\")     No LMP recorded. Patient is postmenopausal.    Physical Exam  Constitutional:       Appearance: Normal appearance.   HENT:      Right Ear: Tympanic membrane normal.      Left Ear: Tympanic membrane normal.      Nose: Nose normal.   Eyes:      Extraocular Movements: Extraocular movements intact.      Pupils: Pupils are equal, round, and reactive to light.   Cardiovascular:      Rate and Rhythm: Normal rate and regular rhythm.   Pulmonary:      Breath sounds: Wheezing present.   Neurological:      Mental Status: She is alert.         Procedures    Point of Care Test & Imaging Results from this visit  Results for orders placed or performed in visit on 02/03/25   POCT Covid-19 Rapid Antigen   Result Value Ref Range    POC LASHAUN-COV-2 AG  Presumptive negative test for SARS-CoV-2 (no antigen detected)     Presumptive negative test for SARS-CoV-2 (no antigen detected)   POCT Influenza A/B manually resulted   Result Value Ref Range    POC Rapid Influenza A Negative Negative    POC Rapid Influenza B Negative Negative      No results found.    Diagnostic study results (if any) were reviewed by Katt Ram MD.    Assessment/Plan   Allergies, medications, history, and pertinent labs/EKGs/Imaging reviewed by Katt Ram MD.     Medical Decision Making  pt with cough clinically has pneumonia will treat with antibiotics albuterol and prednisone    Orders and Diagnoses  Diagnoses and all orders for this visit:  Fever, unspecified fever cause  -     POCT Covid-19 Rapid Antigen  -     POCT Influenza A/B manually resulted  -     ipratropium-albuteroL (Duo-Neb) 0.5-2.5 mg/3 " mL nebulizer solution 3 mL      Medical Admin Record  Administrations This Visit       ipratropium-albuteroL (Duo-Neb) 0.5-2.5 mg/3 mL nebulizer solution 3 mL       Admin Date  02/03/2025 Action  Given Dose  3 mL Route  nebulization Documented By  Keith Diaz MA                    Patient disposition: Home    Electronically signed by Katt Ram MD  11:14 AM

## 2025-02-04 ENCOUNTER — APPOINTMENT (OUTPATIENT)
Dept: ENDOCRINOLOGY | Facility: CLINIC | Age: 70
End: 2025-02-04
Payer: MEDICARE

## 2025-02-05 DIAGNOSIS — E03.9 HYPOTHYROIDISM, UNSPECIFIED TYPE: ICD-10-CM

## 2025-02-05 RX ORDER — LEVOTHYROXINE SODIUM 125 UG/1
125 TABLET ORAL DAILY
Qty: 90 TABLET | Refills: 0 | Status: SHIPPED | OUTPATIENT
Start: 2025-02-05

## 2025-02-09 ENCOUNTER — APPOINTMENT (OUTPATIENT)
Dept: CARDIOLOGY | Facility: HOSPITAL | Age: 70
End: 2025-02-09
Payer: MEDICARE

## 2025-02-09 ENCOUNTER — HOSPITAL ENCOUNTER (EMERGENCY)
Facility: HOSPITAL | Age: 70
Discharge: HOME | End: 2025-02-09
Attending: STUDENT IN AN ORGANIZED HEALTH CARE EDUCATION/TRAINING PROGRAM
Payer: MEDICARE

## 2025-02-09 ENCOUNTER — APPOINTMENT (OUTPATIENT)
Dept: RADIOLOGY | Facility: HOSPITAL | Age: 70
End: 2025-02-09
Payer: MEDICARE

## 2025-02-09 DIAGNOSIS — R06.02 SHORTNESS OF BREATH: ICD-10-CM

## 2025-02-09 DIAGNOSIS — J40 BRONCHITIS: Primary | ICD-10-CM

## 2025-02-09 DIAGNOSIS — N17.9 AKI (ACUTE KIDNEY INJURY) (CMS-HCC): ICD-10-CM

## 2025-02-09 LAB
ALBUMIN SERPL BCP-MCNC: 4 G/DL (ref 3.4–5)
ALP SERPL-CCNC: 73 U/L (ref 33–136)
ALT SERPL W P-5'-P-CCNC: 23 U/L (ref 7–45)
ANION GAP SERPL CALC-SCNC: 19 MMOL/L (ref 10–20)
AST SERPL W P-5'-P-CCNC: 15 U/L (ref 9–39)
BASOPHILS # BLD AUTO: 0.08 X10*3/UL (ref 0–0.1)
BASOPHILS NFR BLD AUTO: 0.7 %
BILIRUB SERPL-MCNC: 0.6 MG/DL (ref 0–1.2)
BNP SERPL-MCNC: 9 PG/ML (ref 0–99)
BUN SERPL-MCNC: 25 MG/DL (ref 6–23)
CALCIUM SERPL-MCNC: 9.1 MG/DL (ref 8.6–10.3)
CARDIAC TROPONIN I PNL SERPL HS: <3 NG/L (ref 0–13)
CARDIAC TROPONIN I PNL SERPL HS: <3 NG/L (ref 0–13)
CHLORIDE SERPL-SCNC: 96 MMOL/L (ref 98–107)
CO2 SERPL-SCNC: 23 MMOL/L (ref 21–32)
CREAT SERPL-MCNC: 1.44 MG/DL (ref 0.5–1.05)
D DIMER PPP FEU-MCNC: 880 NG/ML FEU
EGFRCR SERPLBLD CKD-EPI 2021: 39 ML/MIN/1.73M*2
EOSINOPHIL # BLD AUTO: 0.12 X10*3/UL (ref 0–0.7)
EOSINOPHIL NFR BLD AUTO: 1 %
ERYTHROCYTE [DISTWIDTH] IN BLOOD BY AUTOMATED COUNT: 13 % (ref 11.5–14.5)
FLUAV RNA RESP QL NAA+PROBE: NOT DETECTED
FLUBV RNA RESP QL NAA+PROBE: NOT DETECTED
GLUCOSE BLD MANUAL STRIP-MCNC: 80 MG/DL (ref 74–99)
GLUCOSE SERPL-MCNC: 172 MG/DL (ref 74–99)
HCT VFR BLD AUTO: 43.3 % (ref 36–46)
HGB BLD-MCNC: 14.6 G/DL (ref 12–16)
IMM GRANULOCYTES # BLD AUTO: 0.3 X10*3/UL (ref 0–0.7)
IMM GRANULOCYTES NFR BLD AUTO: 2.5 % (ref 0–0.9)
LYMPHOCYTES # BLD AUTO: 3.12 X10*3/UL (ref 1.2–4.8)
LYMPHOCYTES NFR BLD AUTO: 25.7 %
MAGNESIUM SERPL-MCNC: 1.81 MG/DL (ref 1.6–2.4)
MCH RBC QN AUTO: 29.8 PG (ref 26–34)
MCHC RBC AUTO-ENTMCNC: 33.7 G/DL (ref 32–36)
MCV RBC AUTO: 88 FL (ref 80–100)
MONOCYTES # BLD AUTO: 0.8 X10*3/UL (ref 0.1–1)
MONOCYTES NFR BLD AUTO: 6.6 %
NEUTROPHILS # BLD AUTO: 7.73 X10*3/UL (ref 1.2–7.7)
NEUTROPHILS NFR BLD AUTO: 63.5 %
NRBC BLD-RTO: 0 /100 WBCS (ref 0–0)
PLATELET # BLD AUTO: 347 X10*3/UL (ref 150–450)
POTASSIUM SERPL-SCNC: 3.9 MMOL/L (ref 3.5–5.3)
PROT SERPL-MCNC: 7.9 G/DL (ref 6.4–8.2)
RBC # BLD AUTO: 4.9 X10*6/UL (ref 4–5.2)
RSV RNA RESP QL NAA+PROBE: NOT DETECTED
SARS-COV-2 RNA RESP QL NAA+PROBE: NOT DETECTED
SODIUM SERPL-SCNC: 134 MMOL/L (ref 136–145)
WBC # BLD AUTO: 12.2 X10*3/UL (ref 4.4–11.3)

## 2025-02-09 PROCEDURE — 71045 X-RAY EXAM CHEST 1 VIEW: CPT | Performed by: RADIOLOGY

## 2025-02-09 PROCEDURE — 87636 SARSCOV2 & INF A&B AMP PRB: CPT | Performed by: PHYSICIAN ASSISTANT

## 2025-02-09 PROCEDURE — 94640 AIRWAY INHALATION TREATMENT: CPT

## 2025-02-09 PROCEDURE — 93005 ELECTROCARDIOGRAM TRACING: CPT

## 2025-02-09 PROCEDURE — 71275 CT ANGIOGRAPHY CHEST: CPT

## 2025-02-09 PROCEDURE — 85025 COMPLETE CBC W/AUTO DIFF WBC: CPT | Performed by: PHYSICIAN ASSISTANT

## 2025-02-09 PROCEDURE — 2500000002 HC RX 250 W HCPCS SELF ADMINISTERED DRUGS (ALT 637 FOR MEDICARE OP, ALT 636 FOR OP/ED): Performed by: PHYSICIAN ASSISTANT

## 2025-02-09 PROCEDURE — 71045 X-RAY EXAM CHEST 1 VIEW: CPT

## 2025-02-09 PROCEDURE — 80053 COMPREHEN METABOLIC PANEL: CPT | Performed by: PHYSICIAN ASSISTANT

## 2025-02-09 PROCEDURE — 2500000004 HC RX 250 GENERAL PHARMACY W/ HCPCS (ALT 636 FOR OP/ED): Performed by: PHYSICIAN ASSISTANT

## 2025-02-09 PROCEDURE — 96366 THER/PROPH/DIAG IV INF ADDON: CPT

## 2025-02-09 PROCEDURE — 83880 ASSAY OF NATRIURETIC PEPTIDE: CPT | Performed by: PHYSICIAN ASSISTANT

## 2025-02-09 PROCEDURE — 83735 ASSAY OF MAGNESIUM: CPT | Performed by: PHYSICIAN ASSISTANT

## 2025-02-09 PROCEDURE — 96361 HYDRATE IV INFUSION ADD-ON: CPT

## 2025-02-09 PROCEDURE — 96367 TX/PROPH/DG ADDL SEQ IV INF: CPT

## 2025-02-09 PROCEDURE — 36415 COLL VENOUS BLD VENIPUNCTURE: CPT | Performed by: PHYSICIAN ASSISTANT

## 2025-02-09 PROCEDURE — 87634 RSV DNA/RNA AMP PROBE: CPT | Performed by: PHYSICIAN ASSISTANT

## 2025-02-09 PROCEDURE — 85379 FIBRIN DEGRADATION QUANT: CPT | Performed by: PHYSICIAN ASSISTANT

## 2025-02-09 PROCEDURE — 96360 HYDRATION IV INFUSION INIT: CPT | Mod: 59

## 2025-02-09 PROCEDURE — 84484 ASSAY OF TROPONIN QUANT: CPT | Mod: 91 | Performed by: PHYSICIAN ASSISTANT

## 2025-02-09 PROCEDURE — 99285 EMERGENCY DEPT VISIT HI MDM: CPT | Mod: 25 | Performed by: STUDENT IN AN ORGANIZED HEALTH CARE EDUCATION/TRAINING PROGRAM

## 2025-02-09 PROCEDURE — 96365 THER/PROPH/DIAG IV INF INIT: CPT | Mod: 59

## 2025-02-09 PROCEDURE — 84484 ASSAY OF TROPONIN QUANT: CPT | Performed by: PHYSICIAN ASSISTANT

## 2025-02-09 PROCEDURE — 2550000001 HC RX 255 CONTRASTS: Performed by: PHYSICIAN ASSISTANT

## 2025-02-09 PROCEDURE — 71275 CT ANGIOGRAPHY CHEST: CPT | Performed by: STUDENT IN AN ORGANIZED HEALTH CARE EDUCATION/TRAINING PROGRAM

## 2025-02-09 PROCEDURE — 2500000004 HC RX 250 GENERAL PHARMACY W/ HCPCS (ALT 636 FOR OP/ED): Performed by: STUDENT IN AN ORGANIZED HEALTH CARE EDUCATION/TRAINING PROGRAM

## 2025-02-09 PROCEDURE — 82947 ASSAY GLUCOSE BLOOD QUANT: CPT | Mod: 59

## 2025-02-09 RX ORDER — BENZONATATE 100 MG/1
100 CAPSULE ORAL 3 TIMES DAILY PRN
Qty: 20 CAPSULE | Refills: 0 | Status: SHIPPED | OUTPATIENT
Start: 2025-02-09 | End: 2025-02-16

## 2025-02-09 RX ORDER — AZITHROMYCIN 250 MG/1
250 TABLET, FILM COATED ORAL DAILY
Qty: 4 TABLET | Refills: 0 | Status: SHIPPED | OUTPATIENT
Start: 2025-02-09 | End: 2025-02-13

## 2025-02-09 RX ORDER — IPRATROPIUM BROMIDE AND ALBUTEROL SULFATE 2.5; .5 MG/3ML; MG/3ML
3 SOLUTION RESPIRATORY (INHALATION) ONCE
Status: COMPLETED | OUTPATIENT
Start: 2025-02-09 | End: 2025-02-09

## 2025-02-09 RX ORDER — ALBUTEROL SULFATE 90 UG/1
2 INHALANT RESPIRATORY (INHALATION) EVERY 4 HOURS PRN
Qty: 18 G | Refills: 0 | Status: SHIPPED | OUTPATIENT
Start: 2025-02-09 | End: 2025-03-11

## 2025-02-09 RX ORDER — PREDNISONE 10 MG/1
TABLET ORAL
Qty: 20 TABLET | Refills: 0 | Status: SHIPPED | OUTPATIENT
Start: 2025-02-09

## 2025-02-09 RX ADMIN — IOHEXOL 90 ML: 350 INJECTION, SOLUTION INTRAVENOUS at 15:12

## 2025-02-09 RX ADMIN — AZITHROMYCIN MONOHYDRATE 500 MG: 500 INJECTION, POWDER, LYOPHILIZED, FOR SOLUTION INTRAVENOUS at 12:51

## 2025-02-09 RX ADMIN — SODIUM CHLORIDE 1000 ML: 9 INJECTION, SOLUTION INTRAVENOUS at 16:59

## 2025-02-09 RX ADMIN — IPRATROPIUM BROMIDE AND ALBUTEROL SULFATE 3 ML: 2.5; .5 SOLUTION RESPIRATORY (INHALATION) at 12:24

## 2025-02-09 RX ADMIN — CEFTRIAXONE 2 G: 2 INJECTION, POWDER, FOR SOLUTION INTRAMUSCULAR; INTRAVENOUS at 12:26

## 2025-02-09 ASSESSMENT — PAIN DESCRIPTION - LOCATION: LOCATION: CHEST

## 2025-02-09 ASSESSMENT — PAIN DESCRIPTION - FREQUENCY: FREQUENCY: CONSTANT/CONTINUOUS

## 2025-02-09 ASSESSMENT — PAIN DESCRIPTION - DESCRIPTORS: DESCRIPTORS: ACHING

## 2025-02-09 ASSESSMENT — PAIN SCALES - GENERAL: PAINLEVEL_OUTOF10: 6

## 2025-02-09 ASSESSMENT — PAIN DESCRIPTION - ORIENTATION: ORIENTATION: MID

## 2025-02-09 ASSESSMENT — PAIN DESCRIPTION - PAIN TYPE: TYPE: ACUTE PAIN

## 2025-02-09 ASSESSMENT — PAIN DESCRIPTION - ONSET: ONSET: GRADUAL

## 2025-02-09 ASSESSMENT — PAIN - FUNCTIONAL ASSESSMENT: PAIN_FUNCTIONAL_ASSESSMENT: 0-10

## 2025-02-09 ASSESSMENT — PAIN DESCRIPTION - PROGRESSION: CLINICAL_PROGRESSION: NOT CHANGED

## 2025-02-09 NOTE — ED PROVIDER NOTES
HPI   Chief Complaint   Patient presents with    Shortness of Breath       Pleasant 69-year-old female presents with shortness of breath she was recently seen at an urgent care diagnosed with pneumonia clinically she was placed on Zithromax and steroid is unclear if she took the Zithromax but did take the steroid never felt better also had the albuterol inhaler.  She presents today questioning if the medications did not work.  She has no pleuritic pain but does have a very deep cough.              Patient History   Past Medical History:   Diagnosis Date    Allergic     Arthritis     Concussion with loss of consciousness of 30 minutes or less, subsequent encounter 2021    Concussion with loss of consciousness of 30 minutes or less, subsequent encounter    Diabetes mellitus (Multi)     GERD (gastroesophageal reflux disease)     Herpes zoster 2024    Hypertension     Personal history of other diseases of the circulatory system     History of hypertension    Personal history of other specified conditions     History of seizure    Personal history of other specified conditions 2020    History of vertigo    Seizures (Multi)     Stroke (Multi) 1976     Past Surgical History:   Procedure Laterality Date    HERNIA REPAIR  Baby    HYSTERECTOMY      OTHER SURGICAL HISTORY  2021    Subtotal thyroidectomy     Family History   Problem Relation Name Age of Onset    Breast cancer Cousin      Ovarian cancer Cousin      Hypertension Father Dylan Ruelas     Cancer Maternal Grandmother Kim rowland      Social History     Tobacco Use    Smoking status: Former     Current packs/day: 0.00     Average packs/day: 0.3 packs/day for 15.0 years (3.8 ttl pk-yrs)     Types: Cigarettes     Quit date: 10/19/1999     Years since quittin.3     Passive exposure: Never    Smokeless tobacco: Never    Tobacco comments:     Over 25 years ago   Vaping Use    Vaping status: Never Used   Substance Use Topics    Alcohol  use: Not Currently     Comment: Once a month maybe    Drug use: Never       Physical Exam   ED Triage Vitals   Temperature Heart Rate Respirations BP   02/09/25 1133 02/09/25 1133 02/09/25 1133 02/09/25 1133   37.3 °C (99.2 °F) (!) 120 (!) 24 (!) 125/99      Pulse Ox Temp src Heart Rate Source Patient Position   02/09/25 1133 -- 02/09/25 1236 02/09/25 1133   99 %  Monitor Sitting      BP Location FiO2 (%)     02/09/25 1133 --     Left arm        Physical Exam  Vitals reviewed.   Constitutional:       General: She is not in acute distress.     Appearance: Normal appearance. She is normal weight. She is not ill-appearing, toxic-appearing or diaphoretic.   HENT:      Head: Normocephalic and atraumatic.      Right Ear: External ear normal.      Left Ear: External ear normal.      Nose: Nose normal.   Eyes:      Extraocular Movements: Extraocular movements intact.      Conjunctiva/sclera: Conjunctivae normal.      Pupils: Pupils are equal, round, and reactive to light.   Cardiovascular:      Rate and Rhythm: Regular rhythm. Tachycardia present.   Pulmonary:      Effort: Pulmonary effort is normal. No respiratory distress.      Breath sounds: No stridor. Wheezing present.   Chest:      Chest wall: No mass or tenderness.   Abdominal:      General: There is no distension.   Musculoskeletal:         General: No swelling or deformity. Normal range of motion.   Skin:     General: Skin is warm.      Capillary Refill: Capillary refill takes less than 2 seconds.      Findings: No rash.   Neurological:      General: No focal deficit present.      Mental Status: She is alert and oriented to person, place, and time. Mental status is at baseline.   Psychiatric:         Mood and Affect: Mood normal.         Behavior: Behavior normal.         Thought Content: Thought content normal.         Judgment: Judgment normal.           ED Course & MDM   ED Course as of 02/09/25 1821   Sun Feb 09, 2025   1344 Gfr 39, will give hydration and  complete ct PE [JF]   1754 Repeat bp 146 systolic/ 58. ,  Not 85/58 (was taken when not on arm). Patient comfortable going home.  [JF]      ED Course User Index  [JF] Aaron Taylor PA-C         Diagnoses as of 02/09/25 1821   Bronchitis   Shortness of breath   JOHNSON (acute kidney injury) (CMS-[facility])                 No data recorded     [place] Coma Scale Score: 15 (02/09/25 1200 : Coral Hernandez RN)                           Medical Decision Making  Differential diagnosis of shortness of breath bronchitis pneumonia pulmonary embolism CHF    D-dimer slightly elevated CT angio shows no PE patient seen with ED attending she did have some discomfort ambulating but did not desaturate.  Was hydrated for JOHNSON and her blood pressure normalized.    Note there is an erroneous low blood pressure.    Patient is comfortable going home with trying medications return precautions and outpatient follow-up.  Ultimately discharged    Amount and/or Complexity of Data Reviewed  ECG/medicine tests: independent interpretation performed.     Details: 120 bpm sinus tachycardia no ST elevation or depression        Procedure  Procedures     Aaron Taylor PA-C  02/09/25 1823       Aaron Taylor PA-C  02/09/25 1823

## 2025-02-09 NOTE — ED PROVIDER NOTES
Emergency Medicine Attending Attestation:     This patient was seen by the advanced practice provider.  I have personally performed a substantive portion of the encounter.  I have seen and examined the patient; agree with the workup, evaluation, MDM, management and diagnosis.  The care plan has been discussed.      I personally saw the patient and made/approved the management plan and take responsibility for the patient management.    History: 69-year-old female with history of hypertension, type 2 diabetes, CKD presents with ongoing cough, some wheezing, and shortness of breath.  Cough productive of sputum.  She occasionally has some chest tightness with coughing but denies chest pain.  Has not had much of an appetite but denies nausea, vomiting, diarrhea.  Denies lower extremity edema.  Denies orthopnea, PND.    Exam: Awake, alert, not in acute distress.  Mucous membranes dry.  Lungs with mild wheezing bilaterally but able to speak in full sentences without respiratory distress.  Heart with tachycardic rate, regular rhythm.  Peripheral pulses equal and symmetric.  No lower extremity edema.    MDM:   Patient staffed with me after vitals had initially not improved and D-dimer resulted positive.  Prior to my evaluation, she had been given ceftriaxone and azithromycin given reports of possible pneumonia diagnosis at urgent care a few days ago.  Chest x-ray today shows no evidence of pneumonia, pneumothorax, pulm edema, pleural effusion.  She was given a DuoNeb with improvement in wheezing.  She was given a bolus of IV fluids with improvement in her heart rate.  Labs showed mild JOHNSON.  High-sensitivity troponin not elevated, doubt ACS/MI.  BNP not elevated, doubt CHF.  CT PE was obtained, shows no evidence of PE or other acute pathology in the chest.  Suspect ongoing bronchitis.  Her vitals have improved after bolus of IV fluids.  Walk pulse ox without any desaturations.  She is tolerating p.o. without difficulty.  She  will be discharged home in improved condition with continued supportive care.    External Chart Review: Urgent care visit 2/3/25 for cough. Prescribed steroids and azithro     Independent Test Interpretation: See ED Course or Below Text  Data Personally Interpreted: CBC: no anemia, BMP: JOHNSON, hyperglycemia without signs of DKA, no clinically significant electrolyte abnormalities, high sensitivity troponin not elevated, doubt ACS, BNP not elevated doubt volume overload, covid/flu negative    Imaging personally reviewed: CXR without evidence of PTX, PNA, widened mediastinum, or pulmonary edema.  CTPE negative for PE.. Final decision making per radiology read.     I have personally reviewed and interpreted the EKG obtained.  Sinus tachycardia, rate 120 BPM  Normal axis  NH interval and QRS duration within normal limits.  QTc within normal limits.  No signs of acute ischemia or infarction     Final diagnoses:   [J40] Bronchitis   [R06.02] Shortness of breath   [N17.9] JOHNSON (acute kidney injury) (CMS-[facility])       Steffen Simerlink, MD Steffen Simerlink, MD  02/12/25 2622

## 2025-02-09 NOTE — ED TRIAGE NOTES
PT having sob with productive cough for over a week. Diagnosed pneumonia at . Mid chest pain with feeling of palpitations. States having dizziness. Antibiotic, prednisone, and inhaler used at home with no relief.

## 2025-02-09 NOTE — Clinical Note
Earline Villagran was seen and treated in our emergency department on 2/9/2025.  She may return to work on 02/11/2025.       If you have any questions or concerns, please don't hesitate to call.      Aaron Taylor PA-C

## 2025-02-10 LAB
ATRIAL RATE: 120 BPM
P AXIS: 64 DEGREES
P OFFSET: 200 MS
P ONSET: 141 MS
PR INTERVAL: 158 MS
Q ONSET: 220 MS
QRS COUNT: 19 BEATS
QRS DURATION: 80 MS
QT INTERVAL: 318 MS
QTC CALCULATION(BAZETT): 449 MS
QTC FREDERICIA: 400 MS
R AXIS: 86 DEGREES
T AXIS: 55 DEGREES
T OFFSET: 379 MS
VENTRICULAR RATE: 120 BPM

## 2025-02-12 VITALS
WEIGHT: 228 LBS | RESPIRATION RATE: 15 BRPM | OXYGEN SATURATION: 96 % | SYSTOLIC BLOOD PRESSURE: 146 MMHG | TEMPERATURE: 99.2 F | HEART RATE: 98 BPM | DIASTOLIC BLOOD PRESSURE: 58 MMHG | HEIGHT: 65 IN | BODY MASS INDEX: 37.99 KG/M2

## 2025-02-17 DIAGNOSIS — R05.1 ACUTE COUGH: Primary | ICD-10-CM

## 2025-02-17 RX ORDER — BENZONATATE 100 MG/1
100 CAPSULE ORAL 3 TIMES DAILY PRN
Qty: 30 CAPSULE | Refills: 0 | Status: SHIPPED | OUTPATIENT
Start: 2025-02-17

## 2025-02-18 DIAGNOSIS — L29.9 ITCHING: ICD-10-CM

## 2025-02-18 RX ORDER — HYDROXYZINE HYDROCHLORIDE 10 MG/1
TABLET, FILM COATED ORAL
Qty: 270 TABLET | Refills: 0 | Status: SHIPPED | OUTPATIENT
Start: 2025-02-18

## 2025-02-19 ENCOUNTER — OFFICE VISIT (OUTPATIENT)
Dept: URGENT CARE | Age: 70
End: 2025-02-19
Payer: MEDICARE

## 2025-02-19 VITALS
SYSTOLIC BLOOD PRESSURE: 104 MMHG | BODY MASS INDEX: 37.94 KG/M2 | HEART RATE: 118 BPM | WEIGHT: 228 LBS | OXYGEN SATURATION: 97 % | RESPIRATION RATE: 18 BRPM | DIASTOLIC BLOOD PRESSURE: 73 MMHG

## 2025-02-19 DIAGNOSIS — J01.00 ACUTE MAXILLARY SINUSITIS, RECURRENCE NOT SPECIFIED: ICD-10-CM

## 2025-02-19 DIAGNOSIS — J40 BRONCHITIS: Primary | ICD-10-CM

## 2025-02-19 DIAGNOSIS — R06.09 OTHER FORM OF DYSPNEA: ICD-10-CM

## 2025-02-19 RX ORDER — DOXYCYCLINE HYCLATE 100 MG
100 TABLET ORAL 2 TIMES DAILY
Qty: 20 TABLET | Refills: 0 | Status: SHIPPED | OUTPATIENT
Start: 2025-02-19 | End: 2025-03-01

## 2025-02-19 RX ORDER — BENZONATATE 200 MG/1
200 CAPSULE ORAL 3 TIMES DAILY PRN
Qty: 30 CAPSULE | Refills: 0 | Status: SHIPPED | OUTPATIENT
Start: 2025-02-19 | End: 2025-02-26

## 2025-02-19 RX ORDER — IPRATROPIUM BROMIDE AND ALBUTEROL SULFATE 2.5; .5 MG/3ML; MG/3ML
3 SOLUTION RESPIRATORY (INHALATION) ONCE
Status: COMPLETED | OUTPATIENT
Start: 2025-02-19 | End: 2025-02-19

## 2025-02-19 RX ORDER — ALBUTEROL SULFATE 90 UG/1
2 INHALANT RESPIRATORY (INHALATION) EVERY 6 HOURS PRN
Qty: 18 G | Refills: 0 | Status: SHIPPED | OUTPATIENT
Start: 2025-02-19 | End: 2026-02-19

## 2025-02-19 RX ADMIN — IPRATROPIUM BROMIDE AND ALBUTEROL SULFATE 3 ML: 2.5; .5 SOLUTION RESPIRATORY (INHALATION) at 13:11

## 2025-02-19 ASSESSMENT — ENCOUNTER SYMPTOMS: COUGH: 1

## 2025-02-19 NOTE — PROGRESS NOTES
Subjective   Patient ID: Earline Villagran is a 69 y.o. female. They present today with a chief complaint of Shortness of Breath (Patient here with shortness breath ).    History of Present Illness  Pt was seen in the ED 2/9/25, in which was discharge home and if has any concerns or symptoms, to GO BACK to ER      History: 69-year-old female with history of hypertension, type 2 diabetes, CKD presents with ongoing cough, some wheezing, and shortness of breath.  Cough productive of sputum.  She occasionally has some chest tightness with coughing but denies chest pain.  Has not had much of an appetite but denies nausea, vomiting, diarrhea.  Denies lower extremity edema.  Denies orthopnea, PND.     Exam: Awake, alert, not in acute distress.  Mucous membranes dry.  Lungs with mild wheezing bilaterally but able to speak in full sentences without respiratory distress.  Heart with tachycardic rate, regular rhythm.  Peripheral pulses equal and symmetric.  No lower extremity edema.     MDM:   Patient staffed with me after vitals had initially not improved and D-dimer resulted positive.  Prior to my evaluation, she had been given ceftriaxone and azithromycin given reports of possible pneumonia diagnosis at urgent care a few days ago.  Chest x-ray today shows no evidence of pneumonia, pneumothorax, pulm edema, pleural effusion.  She was given a DuoNeb with improvement in wheezing.  She was given a bolus of IV fluids with improvement in her heart rate.  Labs showed mild JOHNSON.  High-sensitivity troponin not elevated, doubt ACS/MI.  BNP not elevated, doubt CHF.  CT PE was obtained, shows no evidence of PE or other acute pathology in the chest.  Suspect ongoing bronchitis.  Her vitals have improved after bolus of IV fluids.  Walk pulse ox without any desaturations.  She is tolerating p.o. without difficulty.  She will be discharged home in improved condition with continued supportive care.            Past Medical History  Allergies  as of 02/19/2025 - Reviewed 02/19/2025   Allergen Reaction Noted    Raspberry Shortness of breath 09/12/2024    Shrimp Anaphylaxis 08/07/2023    Ciprofloxacin Rash 07/16/2009    Clindamycin Diarrhea 01/12/2013    Green tea Swelling 05/23/2012    Penicillins Syncope 02/23/2006    Sulfamethoxazole-trimethoprim Hives, Unknown, and Rash 02/23/2006    Phenylephrine-guaifenesin Palpitations 11/18/2013       (Not in a hospital admission)       Past Medical History:   Diagnosis Date    Allergic     Arthritis     Concussion with loss of consciousness of 30 minutes or less, subsequent encounter 11/29/2021    Concussion with loss of consciousness of 30 minutes or less, subsequent encounter    Diabetes mellitus (Multi)     GERD (gastroesophageal reflux disease)     Herpes zoster 05/19/2024    Hypertension     Personal history of other diseases of the circulatory system     History of hypertension    Personal history of other specified conditions     History of seizure    Personal history of other specified conditions 12/12/2020    History of vertigo    Seizures (Multi)     Stroke (Multi) 8/1976       Past Surgical History:   Procedure Laterality Date    HERNIA REPAIR  Baby    HYSTERECTOMY  Feb22    OTHER SURGICAL HISTORY  01/02/2021    Subtotal thyroidectomy        reports that she quit smoking about 25 years ago. Her smoking use included cigarettes. She has a 3.8 pack-year smoking history. She has never been exposed to tobacco smoke. She has never used smokeless tobacco. She reports that she does not currently use alcohol. She reports that she does not use drugs.    Review of Systems  Review of Systems   Respiratory:  Positive for cough.    All other systems reviewed and are negative.                                 Objective    Vitals:    02/19/25 1252   BP: 104/73   BP Location: Left arm   Patient Position: Sitting   Pulse: (!) 118   Resp: 18   SpO2: 97%   Weight: 103 kg (228 lb)     No LMP recorded. Patient is  postmenopausal.    Physical Exam  Vitals and nursing note reviewed.   Constitutional:       Appearance: Normal appearance. She is ill-appearing.   HENT:      Head: Normocephalic.      Right Ear: Tympanic membrane and external ear normal.      Left Ear: Tympanic membrane and external ear normal.      Nose: Congestion and rhinorrhea present.      Right Turbinates: Enlarged and swollen.      Left Turbinates: Enlarged and swollen.      Mouth/Throat:      Mouth: Mucous membranes are dry.      Pharynx: Oropharynx is clear. Posterior oropharyngeal erythema and postnasal drip present.      Tonsils: No tonsillar exudate or tonsillar abscesses. 0 on the right. 0 on the left.   Eyes:      Extraocular Movements: Extraocular movements intact.      Pupils: Pupils are equal, round, and reactive to light.   Cardiovascular:      Rate and Rhythm: Normal rate and regular rhythm.      Pulses: Normal pulses.      Heart sounds: Normal heart sounds.   Pulmonary:      Effort: Pulmonary effort is normal.      Breath sounds: Examination of the right-lower field reveals rhonchi. Examination of the left-lower field reveals rhonchi. Rhonchi present.   Musculoskeletal:         General: Normal range of motion.      Cervical back: Normal range of motion and neck supple.   Lymphadenopathy:      Cervical: Cervical adenopathy present.   Skin:     General: Skin is warm and dry.   Neurological:      General: No focal deficit present.      Mental Status: She is alert and oriented to person, place, and time.   Psychiatric:         Mood and Affect: Mood normal.         Behavior: Behavior normal.         Procedures    Point of Care Test & Imaging Results from this visit  No results found for this visit on 02/19/25.   No results found.    Diagnostic study results (if any) were reviewed by [facility].    Assessment/Plan   Allergies, medications, history, and pertinent labs/EKGs/Imaging reviewed by RANDI Villagomez-CNP.     Medical  Decision Making  70 y/o F seen in the ER 2/9, dx with bronchitis and given home meds. CT of lungs and chest xray conducted, neg for PE and pneumonia    Pt continues to have cough and worried, she is being treated with prednisone and zpak    Pt managing secretions, airway intact. There or no signs of PTA/TA, trismus, retropharyngeal abscess or epiglotitis. No signs of osteomyelitis, orbital cellulitis or other complications of sinusitis at this point in time. CXR deferred at this time as lungs are CTA and there is no signs of respiratory distress, pt had CT of lungs and xray of lungs on 2/9/25 with no acute process, SpO2 >94% on RA. There is no reported SOB, CP, GARCIA, pleuritic pain, fever. Clinical suspicions of pneumonia or other cardiorespiratory catastrophe at this time are low. Pt is ambulating without difficulty showing no signs of hypoxia. Given the pt underlying risk factors, and exam will err on the side of caution and cover for underlying ARBS/CAP organisms and prescribed doxycycline . Pt well-hydrated, nontoxic and there no signs of clinical deterioration. Patient well hydrated, neurovascularly intact. Advised normal saline mist spray TID, flonase daily, antihistamine daily, vit d3/c/zinc/elderberry, probiotics for gut health, and if s/s persist after 48 hours to go to the ER or call 911, at this point this would be patients 3rd failed treatment, in which pt would require higher level of care and interventions  Duo neb given- improved air movement A/P, minimal rhonchi present initially but resolved    Follow up Care: Pt instructed to follow-up with PCP or other appropriate clinician within 24 to 48 hours. Report to ED if there is any development in worsening pain, difficulty swallowing, change in phonation, fever, chills, neck pain, photophobia, headache, neck stiffness, chest pain, abdominal pain, vomiting, syncope, hemoptysis, leg swelling SOB, fever, facial swelling, eye pain, periorbital  swelling/erythema, or any new signs or sx.   - Symptomatic treatment with prn analgesia  - Supportive care with fluids and rest  - The patient may also use OTC decongestants prn, OTC cough and cold meds as needed, warm salt water gargles, throat lozenges and/or OTC throat spray as needed, and nasal saline gtts and suction prn.  - Follow up in  1 week  if symptoms persist or sooner if worsening of symptoms  - Pain relievers (tylenol) for relief of headache, body aches, malaise, muscle and joint pain, ear ache  - Rest, increased fluids, frequent hand washing  - RTC if symptoms persist, worsen or proceed to ER for symptoms of increased SOB, chest pain, resp distress, high fever, pain with breathing, etc.   - The patient voiced understanding and agreed with the plan.      The patient was educated regarding diagnosis, supportive care, OTC and Rx medications. The patient was given the opportunity to ask questions prior to discharge. They verbalized understanding of my discussion of the plans for treatment, expected course, indications to return to  or seek further evaluation in ED, and the need for timely follow up as directed.     Can try Sambucol Black Elderberry Syrup and Extra Vitamin C and Zinc Lozenges (lozenges only if over 3 years of age)      1. **Stay Home**: Individuals who are sick should stay home for at least 24 hours after their fever has subsided without the use of fever-reducing medications.  2. **Avoid Close Contact**: Sick individuals should avoid close contact with others to prevent spreading the virus.  3. **Hygiene Practices**: Frequent handwashing, using hand sanitizers, and covering coughs and sneezes are encouraged to reduce transmission.  4. **Vaccination**: Annual flu vaccines are recommended for most individuals to help prevent illness    Orders and Diagnoses  Diagnoses and all orders for this visit:  Bronchitis  -     albuterol 90 mcg/actuation inhaler; Inhale 2 puffs every 6 hours if needed  for wheezing.  -     doxycycline (Vibra-Tabs) 100 mg tablet; Take 1 tablet (100 mg) by mouth 2 times a day for 10 days. Take with a full glass of water and do not lie down for at least 30 minutes after.  -     benzonatate (Tessalon) 200 mg capsule; Take 1 capsule (200 mg) by mouth 3 times a day as needed for cough for up to 7 days. Do not crush or chew.  -     ipratropium-albuteroL (Duo-Neb) 0.5-2.5 mg/3 mL nebulizer solution 3 mL  Other form of dyspnea  Acute maxillary sinusitis, recurrence not specified      Medical Admin Record      Patient disposition: Home    Electronically signed by [facility]  1:06 PM

## 2025-02-21 RX ORDER — MECLIZINE HYDROCHLORIDE 25 MG/1
TABLET ORAL
COMMUNITY
Start: 2024-12-20 | End: 2025-02-24 | Stop reason: SDUPTHER

## 2025-02-21 RX ORDER — AMOXICILLIN 500 MG/1
1 CAPSULE ORAL
COMMUNITY
Start: 2024-12-20 | End: 2025-02-24 | Stop reason: ALTCHOICE

## 2025-02-21 RX ORDER — FLUCONAZOLE 150 MG/1
1 TABLET ORAL
COMMUNITY
Start: 2024-11-19 | End: 2025-02-24 | Stop reason: ALTCHOICE

## 2025-02-24 ENCOUNTER — APPOINTMENT (OUTPATIENT)
Dept: PRIMARY CARE | Facility: CLINIC | Age: 70
End: 2025-02-24
Payer: MEDICARE

## 2025-02-24 VITALS
SYSTOLIC BLOOD PRESSURE: 112 MMHG | BODY MASS INDEX: 37.99 KG/M2 | HEIGHT: 65 IN | TEMPERATURE: 97 F | WEIGHT: 228 LBS | DIASTOLIC BLOOD PRESSURE: 74 MMHG | HEART RATE: 119 BPM | OXYGEN SATURATION: 97 %

## 2025-02-24 DIAGNOSIS — J40 BRONCHITIS: Primary | ICD-10-CM

## 2025-02-24 DIAGNOSIS — R42 VERTIGO: ICD-10-CM

## 2025-02-24 PROCEDURE — 3008F BODY MASS INDEX DOCD: CPT | Performed by: FAMILY MEDICINE

## 2025-02-24 PROCEDURE — 1159F MED LIST DOCD IN RCRD: CPT | Performed by: FAMILY MEDICINE

## 2025-02-24 PROCEDURE — 1160F RVW MEDS BY RX/DR IN RCRD: CPT | Performed by: FAMILY MEDICINE

## 2025-02-24 PROCEDURE — 1036F TOBACCO NON-USER: CPT | Performed by: FAMILY MEDICINE

## 2025-02-24 PROCEDURE — 3074F SYST BP LT 130 MM HG: CPT | Performed by: FAMILY MEDICINE

## 2025-02-24 PROCEDURE — 1123F ACP DISCUSS/DSCN MKR DOCD: CPT | Performed by: FAMILY MEDICINE

## 2025-02-24 PROCEDURE — 99213 OFFICE O/P EST LOW 20 MIN: CPT | Performed by: FAMILY MEDICINE

## 2025-02-24 PROCEDURE — 3078F DIAST BP <80 MM HG: CPT | Performed by: FAMILY MEDICINE

## 2025-02-24 RX ORDER — MECLIZINE HYDROCHLORIDE 25 MG/1
25 TABLET ORAL 3 TIMES DAILY PRN
Qty: 30 TABLET | Refills: 1 | Status: SHIPPED | OUTPATIENT
Start: 2025-02-24

## 2025-02-24 NOTE — PATIENT INSTRUCTIONS
Follow up after several visits to UC and ED for ongoing SOB, cough, and some wheezing.  Starting to do better.  Finished prednisone.  Still taking doxycycline and benzonatate.  Albuterol can be used as needed.  Plan to follow up in 2 weeks, to make sure continuing to improve.  Call sooner if there is a problem.    For migraines, Ubrelvy has been helping, although still getting them.  Working with  pharmacy for medication management.  Meclizine helps with vertigo.  Refilled today.

## 2025-02-24 NOTE — PROGRESS NOTES
"Subjective   Patient ID: Earline Villagran is a 69 y.o. female who presents for Follow-up and Pneumonia.  Patient is here for follow up after ED and  visits for pneumonia/bronchitis.  She first went to  on 2/3/25 with cough and SOB.  Covid and flu were negative.  Treated for clinical pneumonia with ceftriaxone and doxycycline.  Treated with duoneb while there.    Went to  ED on 2/9/25 with complaints of cough, wheeze and SOB, not improving.  She had positive D-dimer.  CXR was negative.  CT PE was neg.  BNP neg.  IVF helped her elevated HR come down.   Had duoneb at ED.  Sent home with benzonatate, prednisone taper, albuterol, azithromycin.    Went back to  on 2/19/25 with ongoing wheezing, SOB, and cough.  Given albuterol, doxycycline, benzonatate, duoneb.    Is doing much better now.  Using doxycycline and benzonatate   Also had albuterol.  Is gradually getting better.  Breathing is getting better.  Still bring up mucous.  No fever.  Still has high HR.  Finished the prednisone she had been given.  Hasn't been back to work, and doesn't feel like she is able to yet, not sure if ever.    Has also been having a lot of migraines.  Uses ulbrelvy, which has helped.  Nurtec gave her GI side effects   Talking with  pharmacist, to manage medication.  Was getting one a day, now every other day.  Will get vertigo with it.  Meclizine helps.          Review of Systems    Objective   /74 (BP Location: Right arm, Patient Position: Sitting, BP Cuff Size: Adult)   Pulse (!) 119   Temp 36.1 °C (97 °F) (Temporal)   Ht 1.651 m (5' 5\")   Wt 103 kg (228 lb)   SpO2 97%   BMI 37.94 kg/m²     Physical Exam  Vitals reviewed.   Constitutional:       Appearance: Normal appearance.   Cardiovascular:      Rate and Rhythm: Normal rate and regular rhythm.      Heart sounds: No murmur heard.  Pulmonary:      Effort: Pulmonary effort is normal.      Breath sounds: Normal breath sounds.   Neurological:      Mental Status: She is " alert.           Assessment/Plan   Problem List Items Addressed This Visit    None  Visit Diagnoses       Bronchitis    -  Primary    Vertigo        Relevant Medications    meclizine (Antivert) 25 mg tablet

## 2025-02-25 ENCOUNTER — APPOINTMENT (OUTPATIENT)
Dept: PHARMACY | Facility: HOSPITAL | Age: 70
End: 2025-02-25
Payer: MEDICARE

## 2025-02-25 DIAGNOSIS — E11.22 CKD STAGE 3 DUE TO TYPE 2 DIABETES MELLITUS (MULTI): ICD-10-CM

## 2025-02-25 DIAGNOSIS — N18.30 CKD STAGE 3 DUE TO TYPE 2 DIABETES MELLITUS (MULTI): ICD-10-CM

## 2025-02-25 DIAGNOSIS — E11.65 TYPE 2 DIABETES MELLITUS WITH HYPERGLYCEMIA, WITH LONG-TERM CURRENT USE OF INSULIN: ICD-10-CM

## 2025-02-25 DIAGNOSIS — Z79.4 TYPE 2 DIABETES MELLITUS WITH HYPERGLYCEMIA, WITH LONG-TERM CURRENT USE OF INSULIN: ICD-10-CM

## 2025-02-25 RX ORDER — BLOOD SUGAR DIAGNOSTIC
STRIP MISCELLANEOUS
Qty: 300 STRIP | Refills: 3 | Status: SHIPPED | OUTPATIENT
Start: 2025-02-25

## 2025-02-25 NOTE — PROGRESS NOTES
Clinical Pharmacy Appointment    Patient ID: Earline Villagran is a 69 y.o. female who presents for Diabetes.    Pt is here for Follow Up appointment.     Referring Provider: Patricia Calderon MD  PCP: Patricia Calderon MD   Last visit with PCP: 2025   Next visit with PCP: 3/13/2025    Subjective   HPI  DIABETES MELLITUS TYPE 2:    Diagnosed with diabetes: 20 years ago.   Known diabetic complications: hyperglycemia.  Does patient follow with Endocrinology: Yes  Last optometry exam: Patient needs updated appointment  Most recent visit in Podiatry: patient denies sores or cuts on feet today      Current diabetic medications include:  Mounjaro 12.5 mg; inject 12.5 mg once weekly on   Glimepiride 4 mg; take 1 tablet BID    Clarifications to above regimen: None  Adverse Effects: Dry Mouth    Past diabetic medications include:  Trulicity, Ozempic, Januvia, Glipizide, Metformin    Glucose Readings:  Glucometer/CGM Type: OneTouch Ultra Glucometer  Patient tests BG 2 times per day    Current home BG readings (mg/dL): FB mg/dL; PPB mg/dL   Previous home BG readings (mg/dL): FB mg/dL; PPB mg/dL    Any episodes of hypoglycemia? No, lowest FBG was in the 80s .  Did patient treat episode of hypoglycemia appropriately? N/A  Does the patient have a prescription for ready-to-use Glucagon? Not on insulin    Lifestyle:  Diet: 2 meals/day.   BK: Cereal, banana  LN: Salad, meat, shrimp  Drinks: Water, sparking water  Exercise:  Walking, patient trying to work out more  Tobacco history: Former smoker    Secondary Prevention:  Statin? Yes  ACE-I/ARB? No  Aspirin? Yes    Pertinent PMH Review:  PMH of Pancreatitis: No  PMH of Retinopathy: No  PMH of Urinary Tract Infections: No  PMH of MTC: No  PMH of MEN2: No  UACR/EGFR in last year?: No  Albumin/Creatine Ratio   Date Value Ref Range Status   2022 17.5 0.0 - 30.0 ug/mg crt Final     Immunizations:  Influenza? Yes  COVID? Yes  Pneumonia? Most  "recent is Prevnar 13 from 8/24/2021    Drug Interactions  No relevant drug interactions were noted.    Medication System Management  Patient's preferred pharmacy: [id number] in [place]  Adherence/Organization: No concerns at this time  Affordability/Accessibility: No concerns at this time      Objective   Allergies   Allergen Reactions    Raspberry Shortness of breath    Ciprofloxacin Rash     Itching, Itching    Clindamycin Diarrhea     Other Reaction(s): GI Upset      diarrhea, diarrhea    Green Tea Swelling     Lip swells   W green tea and neto, Lip swells   W green tea and neto    Penicillins Syncope     Other Reaction(s): Other (See Comments), Syncope, Unknown      Other reaction(s): Other (See Comments) PASSED OUT, PASSED OUT      PASSED OUT, PASSED OUT      \"Passed out\"    Sulfamethoxazole-Trimethoprim Hives, Unknown and Rash     Other Reaction(s): Unknown    Phenylephrine-Guaifenesin Palpitations     Other Reaction(s): Other: See Comments      Other reaction(s): Other: See Comments Fast heart rate      Fast heart rate     Social History     Social History Narrative    Not on file      Medication Review  Current Outpatient Medications   Medication Instructions    albuterol 90 mcg/actuation inhaler 2 puffs, inhalation, Every 6 hours PRN    amLODIPine (NORVASC) 5 mg, oral, Daily    atorvastatin (LIPITOR) 40 mg, oral, Daily    azithromycin (Zithromax) 250 mg tablet Take 2 tabs (500 mg) by mouth today, then take 1 tab daily for 4 days.    benzonatate (TESSALON) 100 mg, oral, 3 times daily PRN, Do not crush or chew.    benzonatate (TESSALON) 200 mg, oral, 3 times daily PRN, Do not crush or chew.    blood sugar diagnostic (OneTouch Ultra Test) strip TEST GLUCOSE 2 TO 3 TIMES DAILY    carvedilol (COREG) 3.125 mg, oral, 2 times daily    doxycycline (VIBRA-TABS) 100 mg, oral, 2 times daily, Take with a full glass of water and do not lie down for at least 30 minutes after.    estradiol " (Estrace) 0.01 % (0.1 mg/gram) vaginal cream Apply 0.5g (blueberry size amount) nightly for 2 weeks, then 2 - 3 times per week    gabapentin (NEURONTIN) 100 mg, oral, 3 times daily    glimepiride (AMARYL) 4 mg, oral, 2 times daily Nitrate    hydroCHLOROthiazide (HYDRODIURIL) 25 mg, oral, Daily    hydrOXYzine HCL (Atarax) 10 mg tablet TAKE 1 TABLET BY MOUTH EVERY 8  HOURS IF NEEDED FOR ITCHING    levothyroxine (SYNTHROID, LEVOXYL) 125 mcg, oral, Daily    meclizine (ANTIVERT) 25 mg, oral, 3 times daily PRN    Mounjaro 12.5 mg, subcutaneous, Once Weekly, Inject contents of (1) pen subcutaneously once a week as directed.    nortriptyline (PAMELOR) 25 mg, oral, 2 times daily    omeprazole (PRILOSEC) 20 mg, oral, Daily    ondansetron ODT (ZOFRAN-ODT) 4 mg, oral, Every 8 hours PRN    OneTouch Delica Plus Lancet 33 gauge misc USE WITH BLOOD GLUCOSE TEST ONCE DAILY    OneTouch Ultra2 Meter misc 1 DEVICE ONCE DAILY. TEST ONE TIME A DAY. INSULIN DEP? NO E11.9 DM 2    Ubrelvy 50 mg, oral, Daily PRN      Vitals  BP Readings from Last 2 Encounters:   02/24/25 112/74   02/19/25 104/73     BMI Readings from Last 1 Encounters:   02/24/25 37.94 kg/m²      Labs  A1C  Lab Results   Component Value Date    HGBA1C 11.8 (A) 09/26/2024    HGBA1C 10.6 (H) 05/19/2024    HGBA1C 8.8 (H) 12/29/2023     BMP  Lab Results   Component Value Date    CALCIUM 9.1 02/09/2025     (L) 02/09/2025    K 3.9 02/09/2025    CO2 23 02/09/2025    CL 96 (L) 02/09/2025    BUN 25 (H) 02/09/2025    CREATININE 1.44 (H) 02/09/2025    EGFR 39 (L) 02/09/2025     LFTs  Lab Results   Component Value Date    ALT 23 02/09/2025    AST 15 02/09/2025    ALKPHOS 73 02/09/2025    BILITOT 0.6 02/09/2025     FLP  Lab Results   Component Value Date    TRIG 161 (H) 12/29/2023    CHOL 222 (H) 12/29/2023    LDLF 198 (H) 08/09/2023    LDLCALC 140 (H) 12/29/2023    HDL 50.3 12/29/2023     Urine Microalbumin  Lab Results   Component Value Date    MICROALBCREA 17.5 06/04/2022      Weight Management  Wt Readings from Last 3 Encounters:   02/24/25 103 kg (228 lb)   02/19/25 103 kg (228 lb)   02/09/25 103 kg (228 lb)      There is no height or weight on file to calculate BMI.     Assessment/Plan   Problem List Items Addressed This Visit       Type 2 diabetes mellitus with hyperglycemia, with long-term current use of insulin     Patient's goal A1c is < 7%.  Is pt at goal? No, patient's most recent A1c from 9/26/2024 was 11.8%  Patient's SMBGs are at goal, patient was noticing some higher blood sugars while she was taking prednisone taper, but back to more of her typically blood sugars now.     Rationale for plan: Patient is well-controlled with her blood sugars at this time, she will continue with her current regimen. Patient did have a couple of days where her FBG was on the lower end of the range, counseled patient that if she experiences hypoglycemia to let me know and we may need to stop the glimepiride.    Medication Changes:  CONTINUE  Mounjaro 12.5 mg; inject 12.5 mg once weekly on Saturdays  Glimepiride 4 mg; take 1 tablet BID    Future Considerations:  Patient is due for an updated A1c  If patient starts experiencing hypoglycemia, can stop glimepiride    Monitoring and Education:  Counseled patient on the goals of treatment (FBG: <130 mg/dL; PPBG: <180 mg/dL)  Answered all patient questions and concerns            Clinical Pharmacist follow-up: 3/25/2025, Telehealth visit    Patient is not followed in [facility]. (If yes, track minutes under compass emma at each visit)    Continue all meds under the continuation of care with the referring provider and clinical pharmacy team.    Thank you,  Theresa Castañeda, PharmD  Clinical Pharmacist - Primary Care  [phone]  2/25/2025    Verbal consent to manage patient's drug therapy was obtained from the patient. They were informed they may decline to participate or withdraw from participation in pharmacy services at any time.

## 2025-02-25 NOTE — ASSESSMENT & PLAN NOTE
Patient's goal A1c is < 7%.  Is pt at goal? No, patient's most recent A1c from 9/26/2024 was 11.8%  Patient's SMBGs are at goal, patient was noticing some higher blood sugars while she was taking prednisone taper, but back to more of her typically blood sugars now.     Rationale for plan: Patient is well-controlled with her blood sugars at this time, she will continue with her current regimen. Patient did have a couple of days where her FBG was on the lower end of the range, counseled patient that if she experiences hypoglycemia to let me know and we may need to stop the glimepiride.    Medication Changes:  CONTINUE  Mounjaro 12.5 mg; inject 12.5 mg once weekly on Saturdays  Glimepiride 4 mg; take 1 tablet BID    Future Considerations:  Patient is due for an updated A1c  If patient starts experiencing hypoglycemia, can stop glimepiride    Monitoring and Education:  Counseled patient on the goals of treatment (FBG: <130 mg/dL; PPBG: <180 mg/dL)  Answered all patient questions and concerns

## 2025-03-07 ENCOUNTER — APPOINTMENT (OUTPATIENT)
Dept: UROLOGY | Facility: CLINIC | Age: 70
End: 2025-03-07
Payer: MEDICARE

## 2025-03-07 VITALS — TEMPERATURE: 95.1 F | SYSTOLIC BLOOD PRESSURE: 138 MMHG | DIASTOLIC BLOOD PRESSURE: 81 MMHG | HEART RATE: 114 BPM

## 2025-03-07 DIAGNOSIS — N39.0 RECURRENT UTI: ICD-10-CM

## 2025-03-07 DIAGNOSIS — R42 VERTIGO: Primary | ICD-10-CM

## 2025-03-07 LAB
POC APPEARANCE, URINE: CLEAR
POC BILIRUBIN, URINE: NEGATIVE
POC BLOOD, URINE: NEGATIVE
POC COLOR, URINE: YELLOW
POC GLUCOSE, URINE: NEGATIVE MG/DL
POC KETONES, URINE: NEGATIVE MG/DL
POC LEUKOCYTES, URINE: ABNORMAL
POC NITRITE,URINE: NEGATIVE
POC PH, URINE: 6 PH
POC PROTEIN, URINE: ABNORMAL MG/DL
POC SPECIFIC GRAVITY, URINE: 1.02
POC UROBILINOGEN, URINE: 0.2 EU/DL

## 2025-03-07 PROCEDURE — 99214 OFFICE O/P EST MOD 30 MIN: CPT | Performed by: STUDENT IN AN ORGANIZED HEALTH CARE EDUCATION/TRAINING PROGRAM

## 2025-03-07 PROCEDURE — 3079F DIAST BP 80-89 MM HG: CPT | Performed by: STUDENT IN AN ORGANIZED HEALTH CARE EDUCATION/TRAINING PROGRAM

## 2025-03-07 PROCEDURE — 3075F SYST BP GE 130 - 139MM HG: CPT | Performed by: STUDENT IN AN ORGANIZED HEALTH CARE EDUCATION/TRAINING PROGRAM

## 2025-03-07 PROCEDURE — 1126F AMNT PAIN NOTED NONE PRSNT: CPT | Performed by: STUDENT IN AN ORGANIZED HEALTH CARE EDUCATION/TRAINING PROGRAM

## 2025-03-07 PROCEDURE — G2211 COMPLEX E/M VISIT ADD ON: HCPCS | Performed by: STUDENT IN AN ORGANIZED HEALTH CARE EDUCATION/TRAINING PROGRAM

## 2025-03-07 PROCEDURE — 1123F ACP DISCUSS/DSCN MKR DOCD: CPT | Performed by: STUDENT IN AN ORGANIZED HEALTH CARE EDUCATION/TRAINING PROGRAM

## 2025-03-07 PROCEDURE — 81003 URINALYSIS AUTO W/O SCOPE: CPT | Performed by: STUDENT IN AN ORGANIZED HEALTH CARE EDUCATION/TRAINING PROGRAM

## 2025-03-07 PROCEDURE — 1159F MED LIST DOCD IN RCRD: CPT | Performed by: STUDENT IN AN ORGANIZED HEALTH CARE EDUCATION/TRAINING PROGRAM

## 2025-03-07 PROCEDURE — 1036F TOBACCO NON-USER: CPT | Performed by: STUDENT IN AN ORGANIZED HEALTH CARE EDUCATION/TRAINING PROGRAM

## 2025-03-07 RX ORDER — ESTRADIOL 0.1 MG/G
CREAM VAGINAL
Qty: 42.5 G | Refills: 11 | Status: SHIPPED | OUTPATIENT
Start: 2025-03-07 | End: 2026-03-07

## 2025-03-07 ASSESSMENT — PAIN SCALES - GENERAL: PAINLEVEL_OUTOF10: 0-NO PAIN

## 2025-03-07 NOTE — PROGRESS NOTES
History Of Present Illness  Earline Villagran is a 69 y.o. female with hx of hysterectomy following up for recurrent UTI in the context of postmenopausal status and uncontrolled diabetes. Has been doing well on vaginal estrogen monotherapy. No recent UTIs or symptoms today, doing well except for vertigo of room spinning that she has recently been experiencing for past 4 months every time she lays down    Negative urine cx: 11/24/24, 12/6/24  Indeterminate urine cx: 8/21/24    Renal function: GFR 39 Feb 2025  A1C: 11.8 on Sept 2024, following closely with her other physicians for this  Renal stone hx: no  PVR today: 5        Past Medical History  She has a past medical history of Allergic, Arthritis, Concussion with loss of consciousness of 30 minutes or less, subsequent encounter (11/29/2021), Diabetes mellitus (Multi), GERD (gastroesophageal reflux disease), Herpes zoster (05/19/2024), Hypertension, Personal history of other diseases of the circulatory system, Personal history of other specified conditions, Personal history of other specified conditions (12/12/2020), Seizures (Multi), and Stroke (Multi) (8/1976).    Surgical History  She has a past surgical history that includes Other surgical history (01/02/2021); Hernia repair (Baby); and Hysterectomy (Feb22).     Social History  She reports that she quit smoking about 25 years ago. Her smoking use included cigarettes. She has a 3.8 pack-year smoking history. She has never been exposed to tobacco smoke. She has never used smokeless tobacco. She reports that she does not currently use alcohol. She reports that she does not use drugs.    Family History  Family History   Problem Relation Name Age of Onset    Breast cancer Cousin      Ovarian cancer Cousin      Hypertension Father Dylan Busbyhard     Cancer Maternal Grandmother Cecybenjamin kashif         Allergies  Raspberry, Ciprofloxacin, Clindamycin, Green tea, Penicillins, Sulfamethoxazole-trimethoprim, and  Phenylephrine-guaifenesin    Review of Systems   Neurological:         +vertigo   All other systems reviewed and are negative.       Physical Exam  Con: awake, alert, NAD  HEENT: normocephalic, speech normal  CV: no peripheral edema  Resp: no increased work of breathing  Neuro: normal mentation  Psych: mood normal  Skin: no rash  : vulva normal without lesions or adhesions, non-bothersome asymptomatic cystocele noted    Chaperone Belinda present for exam     Last Recorded Vitals  Blood pressure 138/81, pulse (!) 114, temperature 35.1 °C (95.1 °F).    Relevant Results    Results for orders placed or performed in visit on 03/07/25 (from the past 24 hours)   POCT UA Automated manually resulted   Result Value Ref Range    POC Color, Urine Yellow Straw, Yellow, Light-Yellow    POC Appearance, Urine Clear Clear    POC Glucose, Urine NEGATIVE NEGATIVE mg/dl    POC Bilirubin, Urine NEGATIVE NEGATIVE    POC Ketones, Urine NEGATIVE NEGATIVE mg/dl    POC Specific Gravity, Urine 1.020 1.005 - 1.035    POC Blood, Urine NEGATIVE NEGATIVE    POC PH, Urine 6.0 No Reference Range Established PH    POC Protein, Urine TRACE (A) NEGATIVE mg/dl    POC Urobilinogen, Urine 0.2 0.2, 1.0 EU/DL    Poc Nitrite, Urine NEGATIVE NEGATIVE    POC Leukocytes, Urine TRACE (A) NEGATIVE          Assessment/Plan     Recurrent UTI:  Vaginal estrogen, doing well with this  Standing urine cultures  Discussed importance of diabetic control for UTI prevention    Routine gyn maintenance:  Mammogram previously ordered  Done with paps  Vulvar exam normal    Vertigo:  Referred to ENT and vestibular PT    Follow up in 1 year from urologic/gynecologic standpoint    Pepper Webb MD, Gynecologist  Female Reconstruction & Sexual Medicine Fellow  Dept of Urology/OBGYN  3/7/2025

## 2025-03-10 ENCOUNTER — OFFICE VISIT (OUTPATIENT)
Dept: PULMONOLOGY | Facility: HOSPITAL | Age: 70
End: 2025-03-10
Payer: MEDICARE

## 2025-03-10 ENCOUNTER — HOSPITAL ENCOUNTER (OUTPATIENT)
Dept: RADIOLOGY | Facility: CLINIC | Age: 70
Discharge: HOME | End: 2025-03-10
Payer: MEDICARE

## 2025-03-10 VITALS
DIASTOLIC BLOOD PRESSURE: 82 MMHG | HEART RATE: 120 BPM | WEIGHT: 231 LBS | SYSTOLIC BLOOD PRESSURE: 117 MMHG | BODY MASS INDEX: 38.44 KG/M2 | OXYGEN SATURATION: 98 % | TEMPERATURE: 97.3 F

## 2025-03-10 VITALS — BODY MASS INDEX: 37.99 KG/M2 | WEIGHT: 228 LBS | HEIGHT: 65 IN

## 2025-03-10 DIAGNOSIS — Z12.31 SCREENING MAMMOGRAM FOR BREAST CANCER: ICD-10-CM

## 2025-03-10 DIAGNOSIS — R00.0 TACHYCARDIA: ICD-10-CM

## 2025-03-10 DIAGNOSIS — R06.02 SOB (SHORTNESS OF BREATH): Primary | ICD-10-CM

## 2025-03-10 PROCEDURE — 77067 SCR MAMMO BI INCL CAD: CPT | Performed by: RADIOLOGY

## 2025-03-10 PROCEDURE — 3074F SYST BP LT 130 MM HG: CPT | Performed by: STUDENT IN AN ORGANIZED HEALTH CARE EDUCATION/TRAINING PROGRAM

## 2025-03-10 PROCEDURE — 3079F DIAST BP 80-89 MM HG: CPT | Performed by: STUDENT IN AN ORGANIZED HEALTH CARE EDUCATION/TRAINING PROGRAM

## 2025-03-10 PROCEDURE — 1123F ACP DISCUSS/DSCN MKR DOCD: CPT | Performed by: STUDENT IN AN ORGANIZED HEALTH CARE EDUCATION/TRAINING PROGRAM

## 2025-03-10 PROCEDURE — 99213 OFFICE O/P EST LOW 20 MIN: CPT | Mod: GC | Performed by: STUDENT IN AN ORGANIZED HEALTH CARE EDUCATION/TRAINING PROGRAM

## 2025-03-10 PROCEDURE — 77063 BREAST TOMOSYNTHESIS BI: CPT | Performed by: RADIOLOGY

## 2025-03-10 PROCEDURE — 1126F AMNT PAIN NOTED NONE PRSNT: CPT | Performed by: STUDENT IN AN ORGANIZED HEALTH CARE EDUCATION/TRAINING PROGRAM

## 2025-03-10 PROCEDURE — 77067 SCR MAMMO BI INCL CAD: CPT

## 2025-03-10 PROCEDURE — 99213 OFFICE O/P EST LOW 20 MIN: CPT | Performed by: STUDENT IN AN ORGANIZED HEALTH CARE EDUCATION/TRAINING PROGRAM

## 2025-03-10 PROCEDURE — 1159F MED LIST DOCD IN RCRD: CPT | Performed by: STUDENT IN AN ORGANIZED HEALTH CARE EDUCATION/TRAINING PROGRAM

## 2025-03-10 RX ORDER — ALBUTEROL SULFATE 0.83 MG/ML
3 SOLUTION RESPIRATORY (INHALATION) ONCE
OUTPATIENT
Start: 2025-03-10 | End: 2025-03-10

## 2025-03-10 RX ORDER — ALBUTEROL SULFATE 90 UG/1
1 INHALANT RESPIRATORY (INHALATION) ONCE
OUTPATIENT
Start: 2025-03-10

## 2025-03-10 ASSESSMENT — PAIN SCALES - GENERAL: PAINLEVEL_OUTOF10: 0-NO PAIN

## 2025-03-10 NOTE — PROGRESS NOTES
Subjective   Patient ID: Earline Villagran is a 69 y.o. female who presents for No chief complaint on file..  HPI  This is a 69-year-old female patient with past medical history of ex-smoker, DM, HTN, and Migraine. Patient was referred to pulmonary clinic for cough and SOB.    Patient was seen in  on 2/3/2025 for cough and fever, COVID19 and flu were negative, treated with bronchodilator, Azithromycin and steroids.    Was seen again in the ED on 2/9/2025 for cough and wheezes, CT PE negative, discharged home after given fluids and bronchodilator with improvement of symptoms. She was also discharged on steroids and antibiotics.    Today, SOB at rest, does get worse with exertion, partially improves with inhalers (albuterol), sometimes she uses it up to 3 times a day, no nocturnal symptoms.     Cough is gone now. No chest pain. No LE swelling.  No hemoptysis. Has good appetite.    Cigarettes smoking: quit 30 years years ago, smokes for 15 years,10 cig a day.  Marijuana:never  Vaping:never    Meds: Albuterol.       Review of Systems  As above.    Objective   Physical Exam  Constitutional:       Appearance: Normal appearance. She is not ill-appearing.   Cardiovascular:      Rate and Rhythm: Regular rhythm. Tachycardia present.      Pulses: Normal pulses.      Heart sounds: No murmur heard.  Pulmonary:      Effort: Pulmonary effort is normal. No respiratory distress.      Breath sounds: Normal breath sounds. No wheezing.   Musculoskeletal:         General: No swelling or tenderness.      Right lower leg: No edema.      Left lower leg: No edema.   Skin:     General: Skin is warm.   Neurological:      General: No focal deficit present.      Mental Status: She is oriented to person, place, and time.         CT scan of the chest 2/2025:  IMPRESSION:  No PE or other acute pulmonary process.      TTE 5/2024:  CONCLUSIONS:   1. Left ventricular systolic function is normal with a 60-65% estimated ejection fraction.   2.  Spectral Doppler shows an impaired relaxation pattern of left ventricular diastolic filling.   3. TAPSE= 20.3 mm.      PFTs:  NA        Assessment/Plan   Diagnoses and all orders for this visit:  SOB (shortness of breath)  The cause of SOB is still unknown, recent CT without any parenchymal disease and negative for PE. Less likely obstructive lung disease but will get PFTs to rule that out. TTE was unremarkable.   She is persistently tachycardic including today in the clinic with HR up to 120, regular. Was sinus tachycardia in the ED, this could be the source of her SOB but also could be another symptom/sign of a disease process yet to be diagnosed.     -     Complete Pulmonary Function Test Pre/Post Bronchodilator (Spirometry Pre/Post/DLCO/Lung Volumes); Future  -     Follow Up In Pulmonology; Future      Tachycardia  Since it is persistent, will send her to see cardiology.  -     Referral to Cardiology; Future      Other orders  -     albuterol 2.5 mg /3 mL (0.083 %) nebulizer solution 3 mL  -     albuterol 90 mcg/actuation inhaler 1 puff     RTC in 2 months.    Shelly Mccracken MD 03/10/25 1:13 PM

## 2025-03-10 NOTE — PATIENT INSTRUCTIONS
Thanks for coming to pulmonary clinic today!    Will get pulmonary function test, please call [phone] to schedule.  Will refer you to see cardiology (heart doctor) for the fast heart rate.   Will see you in 2 months.

## 2025-03-13 ENCOUNTER — APPOINTMENT (OUTPATIENT)
Dept: PRIMARY CARE | Facility: CLINIC | Age: 70
End: 2025-03-13
Payer: MEDICARE

## 2025-03-13 VITALS
TEMPERATURE: 96.9 F | BODY MASS INDEX: 37.72 KG/M2 | WEIGHT: 226.4 LBS | HEART RATE: 122 BPM | DIASTOLIC BLOOD PRESSURE: 82 MMHG | HEIGHT: 65 IN | SYSTOLIC BLOOD PRESSURE: 116 MMHG | OXYGEN SATURATION: 97 %

## 2025-03-13 DIAGNOSIS — R00.0 TACHYCARDIA: Primary | ICD-10-CM

## 2025-03-13 PROCEDURE — 99212 OFFICE O/P EST SF 10 MIN: CPT | Performed by: FAMILY MEDICINE

## 2025-03-13 PROCEDURE — 1036F TOBACCO NON-USER: CPT | Performed by: FAMILY MEDICINE

## 2025-03-13 PROCEDURE — 1160F RVW MEDS BY RX/DR IN RCRD: CPT | Performed by: FAMILY MEDICINE

## 2025-03-13 PROCEDURE — 3079F DIAST BP 80-89 MM HG: CPT | Performed by: FAMILY MEDICINE

## 2025-03-13 PROCEDURE — 1159F MED LIST DOCD IN RCRD: CPT | Performed by: FAMILY MEDICINE

## 2025-03-13 PROCEDURE — 1123F ACP DISCUSS/DSCN MKR DOCD: CPT | Performed by: FAMILY MEDICINE

## 2025-03-13 PROCEDURE — 3074F SYST BP LT 130 MM HG: CPT | Performed by: FAMILY MEDICINE

## 2025-03-13 PROCEDURE — 3008F BODY MASS INDEX DOCD: CPT | Performed by: FAMILY MEDICINE

## 2025-03-13 ASSESSMENT — ENCOUNTER SYMPTOMS
OCCASIONAL FEELINGS OF UNSTEADINESS: 0
DEPRESSION: 0
LOSS OF SENSATION IN FEET: 0

## 2025-03-13 NOTE — PATIENT INSTRUCTIONS
Ongoing shortness of breath and tachycardia.  Testing unremarkable to date.  PFT's are ordered.  Referral to cardiologist put in, with appointment scheduled for April 4.  Will check thyroid blood level, since last check was in June, and had been borderline in the past.    For vertigo, using meclizine as needed.  Referral to vestibular PT is in.

## 2025-03-13 NOTE — PROGRESS NOTES
"Subjective   Patient ID: Earline Villagran is a 69 y.o. female who presents for Follow-up (2 week f/u per doctors orders/Trouble with breathing).  Patient presents for follow up regarding SOB.  Saw pulmonologist on March 10.  HR still high.  PFT's were ordered.  EKG had shown sinus tachycardia. CT neg for PE.  TTE results:  \" 1. Left ventricular systolic function is normal with a 60-65% estimated ejection fraction.   2. Spectral Doppler shows an impaired relaxation pattern of left ventricular diastolic filling.\"  Has cardiology appointment on April 4   Last TSH done in June was normal, but has had some borderline levels prior to that.    She continues to have SOB with any activity.  When sitting she is OK  No CP  No swelling in extremities.  Has ongoing vertigo, but no lightheadedness.    For vertigo, meclizine helps.  Referred to vestibular PT and called, but hasn't heard back yet.        Review of Systems    Objective   /82 (BP Location: Left arm, Patient Position: Sitting, BP Cuff Size: Large adult)   Pulse (!) 122   Temp 36.1 °C (96.9 °F) (Temporal)   Ht 1.651 m (5' 5\")   Wt 103 kg (226 lb 6.4 oz)   SpO2 97%   BMI 37.67 kg/m²     Physical Exam  Vitals reviewed.   Constitutional:       Appearance: Normal appearance.   Cardiovascular:      Rate and Rhythm: Tachycardia present.      Heart sounds: No murmur heard.  Pulmonary:      Effort: Pulmonary effort is normal.      Breath sounds: Normal breath sounds.   Neurological:      Mental Status: She is alert.           Assessment/Plan   Problem List Items Addressed This Visit    None  Visit Diagnoses       Tachycardia    -  Primary    Relevant Orders    TSH with reflex to Free T4 if abnormal               "

## 2025-03-14 LAB
T4 FREE SERPL-MCNC: 1.5 NG/DL (ref 0.8–1.8)
TSH SERPL-ACNC: 10.55 MIU/L (ref 0.4–4.5)

## 2025-03-17 DIAGNOSIS — R42 VERTIGO: ICD-10-CM

## 2025-03-17 DIAGNOSIS — G43.E11 INTRACTABLE CHRONIC MIGRAINE WITH AURA WITH STATUS MIGRAINOSUS: ICD-10-CM

## 2025-03-17 RX ORDER — MECLIZINE HYDROCHLORIDE 25 MG/1
25 TABLET ORAL 3 TIMES DAILY PRN
Qty: 30 TABLET | Refills: 1 | Status: SHIPPED | OUTPATIENT
Start: 2025-03-17 | End: 2025-03-30 | Stop reason: SDUPTHER

## 2025-03-19 ENCOUNTER — APPOINTMENT (OUTPATIENT)
Dept: OPHTHALMOLOGY | Facility: CLINIC | Age: 70
End: 2025-03-19
Payer: MEDICARE

## 2025-03-19 DIAGNOSIS — H52.4 ASTIGMATISM OF BOTH EYES WITH PRESBYOPIA: Primary | ICD-10-CM

## 2025-03-19 DIAGNOSIS — H25.13 AGE-RELATED NUCLEAR CATARACT OF BOTH EYES: ICD-10-CM

## 2025-03-19 DIAGNOSIS — E11.9 TYPE 2 DIABETES MELLITUS WITHOUT COMPLICATION, UNSPECIFIED WHETHER LONG TERM INSULIN USE (MULTI): ICD-10-CM

## 2025-03-19 DIAGNOSIS — H52.203 ASTIGMATISM OF BOTH EYES WITH PRESBYOPIA: Primary | ICD-10-CM

## 2025-03-19 DIAGNOSIS — H52.12 MYOPIA, LEFT EYE: ICD-10-CM

## 2025-03-19 DIAGNOSIS — H18.513 FUCHS' CORNEAL DYSTROPHY OF BOTH EYES: ICD-10-CM

## 2025-03-19 PROCEDURE — 92014 COMPRE OPH EXAM EST PT 1/>: CPT | Performed by: OPTOMETRIST

## 2025-03-19 PROCEDURE — 92134 CPTRZ OPH DX IMG PST SGM RTA: CPT | Performed by: OPTOMETRIST

## 2025-03-19 PROCEDURE — 2023F DILAT RTA XM W/O RTNOPTHY: CPT | Performed by: OPTOMETRIST

## 2025-03-19 PROCEDURE — 92015 DETERMINE REFRACTIVE STATE: CPT | Performed by: OPTOMETRIST

## 2025-03-19 ASSESSMENT — REFRACTION_MANIFEST
OD_CYLINDER: -1.00
OS_SPHERE: -0.50
OD_CYLINDER: -1.00
OD_AXIS: 103
OD_AXIS: 105
METHOD_AUTOREFRACTION: 1
OD_AXIS: 090
OS_AXIS: 035
OD_SPHERE: -0.25
OS_CYLINDER: -1.75
OD_CYLINDER: -1.00
OS_CYLINDER: -1.75
OD_SPHERE: PLANO
OD_SPHERE: PLANO
OS_SPHERE: -1.25
OS_SPHERE: -0.50
OS_AXIS: 035
OS_ADD: +2.50
OS_CYLINDER: -1.50
OS_AXIS: 035
OD_ADD: +2.50

## 2025-03-19 ASSESSMENT — VISUAL ACUITY
METHOD: SNELLEN - LINEAR
OD_PH_SC: 20/40
OD_PH_SC+: -1
OS_PH_SC: 20/40
OS_SC: 20/50
OS_SC+: -2
OD_SC: 20/50

## 2025-03-19 ASSESSMENT — CONF VISUAL FIELD
OD_NORMAL: 1
OS_SUPERIOR_NASAL_RESTRICTION: 0
OD_SUPERIOR_NASAL_RESTRICTION: 0
OD_SUPERIOR_TEMPORAL_RESTRICTION: 0
OS_INFERIOR_TEMPORAL_RESTRICTION: 0
OS_SUPERIOR_TEMPORAL_RESTRICTION: 0
OD_INFERIOR_NASAL_RESTRICTION: 0
OS_INFERIOR_NASAL_RESTRICTION: 0
OD_INFERIOR_TEMPORAL_RESTRICTION: 0
OS_NORMAL: 1

## 2025-03-19 ASSESSMENT — TONOMETRY
OS_IOP_MMHG: 16
OD_IOP_MMHG: 16
IOP_METHOD: GOLDMANN APPLANATION

## 2025-03-19 ASSESSMENT — CUP TO DISC RATIO
OS_RATIO: .35
OD_RATIO: .35

## 2025-03-19 ASSESSMENT — EXTERNAL EXAM - LEFT EYE: OS_EXAM: NORMAL

## 2025-03-19 ASSESSMENT — EXTERNAL EXAM - RIGHT EYE: OD_EXAM: NORMAL

## 2025-03-19 ASSESSMENT — SLIT LAMP EXAM - LIDS
COMMENTS: GOOD POSITION
COMMENTS: GOOD POSITION

## 2025-03-19 ASSESSMENT — ENCOUNTER SYMPTOMS: EYES NEGATIVE: 1

## 2025-03-20 NOTE — PROGRESS NOTES
Assessment/Plan   Problem List Items Addressed This Visit       Type 2 diabetes mellitus without complication (Multi)     No diabetic retinopathy OU. No macular edema.  Pt educated on findings and importance of maintaining a low A1C and FBS to prevent vision loss from diabetic retinopathy. Continue care with PCP as directed. Monitor annually with DFE. Pt voiced understanding.         Relevant Orders    OCT, Retina - OU - Both Eyes (Completed)    Astigmatism of both eyes with presbyopia - Primary     Moderate Rx. Pt reports does not like wearing glasses for distance - states they do not improve vision. BCVA limited secondary to central corneal guttata OU and mild cataracts.   Pt educated on findings. Release Rx for FTW. Discussed adaptation. Discussed options for wearing near only SpecRx. Monitor annually. Pt voiced understanding.          Myopia, left eye     See astigmatism/presbyopia.         Fuchs' corneal dystrophy of both eyes     3+ corneal guttata. Likely causing reduced vision OU and pt complaints of difficulties with lights.   Pt educated on findings. Recommend seeing corneal specialist for options given significant pt symptoms. Will schedule next appt with cornea. In the meantime, use OTC artifical tears four times per day daily. Pt voiced understanding.         Age-related nuclear cataract of both eyes     2+ NS OU.  Mildly visually significant.  Pt educated on findings and importance of UV protection when outdoors. Recommend anti-glare coating in lenses to reduce symptoms of night glare. Continue to monitor annually at this time. Pt voiced understanding.

## 2025-03-20 NOTE — ASSESSMENT & PLAN NOTE
2+ NS OU.  Mildly visually significant.  Pt educated on findings and importance of UV protection when outdoors. Recommend anti-glare coating in lenses to reduce symptoms of night glare. Continue to monitor annually at this time. Pt voiced understanding.

## 2025-03-20 NOTE — ASSESSMENT & PLAN NOTE
No diabetic retinopathy OU. No macular edema.  Pt educated on findings and importance of maintaining a low A1C and FBS to prevent vision loss from diabetic retinopathy. Continue care with PCP as directed. Monitor annually with DFE. Pt voiced understanding.

## 2025-03-20 NOTE — ASSESSMENT & PLAN NOTE
3+ corneal guttata. Likely causing reduced vision OU and pt complaints of difficulties with lights.   Pt educated on findings. Recommend seeing corneal specialist for options given significant pt symptoms. Will schedule next appt with cornea. In the meantime, use OTC artifical tears four times per day daily. Pt voiced understanding.

## 2025-03-20 NOTE — ASSESSMENT & PLAN NOTE
Moderate Rx. Pt reports does not like wearing glasses for distance - states they do not improve vision. BCVA limited secondary to central corneal guttata OU and mild cataracts.   Pt educated on findings. Release Rx for FTW. Discussed adaptation. Discussed options for wearing near only SpecRx. Monitor annually. Pt voiced understanding.

## 2025-03-24 ENCOUNTER — TELEPHONE (OUTPATIENT)
Dept: NEUROLOGY | Facility: CLINIC | Age: 70
End: 2025-03-24
Payer: MEDICARE

## 2025-03-25 ENCOUNTER — APPOINTMENT (OUTPATIENT)
Dept: PHARMACY | Facility: HOSPITAL | Age: 70
End: 2025-03-25
Payer: MEDICARE

## 2025-03-25 ENCOUNTER — OFFICE VISIT (OUTPATIENT)
Dept: OPHTHALMOLOGY | Facility: CLINIC | Age: 70
End: 2025-03-25
Payer: MEDICARE

## 2025-03-25 DIAGNOSIS — Z79.4 TYPE 2 DIABETES MELLITUS WITH HYPERGLYCEMIA, WITH LONG-TERM CURRENT USE OF INSULIN: ICD-10-CM

## 2025-03-25 DIAGNOSIS — H18.593 OTHER HEREDITARY CORNEAL DYSTROPHIES, BILATERAL: ICD-10-CM

## 2025-03-25 DIAGNOSIS — H25.812 COMBINED FORM OF AGE-RELATED CATARACT, LEFT EYE: ICD-10-CM

## 2025-03-25 DIAGNOSIS — H25.811 COMBINED FORM OF AGE-RELATED CATARACT, RIGHT EYE: ICD-10-CM

## 2025-03-25 DIAGNOSIS — E11.65 TYPE 2 DIABETES MELLITUS WITH HYPERGLYCEMIA, WITH LONG-TERM CURRENT USE OF INSULIN: ICD-10-CM

## 2025-03-25 DIAGNOSIS — H18.513 FUCHS' CORNEAL DYSTROPHY OF BOTH EYES: Primary | ICD-10-CM

## 2025-03-25 LAB
CELLS COUNTED (OD): NORMAL CELLS/MM2
CENTRAL CORNEA THICKNESS (OD): 608 MICRONS
CENTRAL CORNEA THICKNESS (OS): 577 MICRONS

## 2025-03-25 PROCEDURE — 92286 ANT SGM IMG I&R SPECLR MIC: CPT | Performed by: OPHTHALMOLOGY

## 2025-03-25 PROCEDURE — 99214 OFFICE O/P EST MOD 30 MIN: CPT | Performed by: OPHTHALMOLOGY

## 2025-03-25 PROCEDURE — 92025 CPTRIZED CORNEAL TOPOGRAPHY: CPT | Performed by: OPHTHALMOLOGY

## 2025-03-25 RX ORDER — TIRZEPATIDE 15 MG/.5ML
15 INJECTION, SOLUTION SUBCUTANEOUS WEEKLY
Qty: 6 ML | Refills: 3 | Status: SHIPPED | OUTPATIENT
Start: 2025-03-25

## 2025-03-25 ASSESSMENT — VISUAL ACUITY
METHOD: SNELLEN - LINEAR
OS_SC: 20/50-2
OD_SC: 20/40-1

## 2025-03-25 ASSESSMENT — TONOMETRY
OS_IOP_MMHG: 26
OD_IOP_MMHG: 24
IOP_METHOD: TONOPEN (SQUEEZING)

## 2025-03-25 ASSESSMENT — CUP TO DISC RATIO
OS_RATIO: .35
OD_RATIO: .35

## 2025-03-25 ASSESSMENT — REFRACTION_MANIFEST
OS_ADD: +2.50
OS_SPHERE: -0.50
OS_AXIS: 035
OS_CYLINDER: -1.75
OD_AXIS: 103
OD_SPHERE: PLANO
OD_CYLINDER: -1.00
OD_ADD: +2.50

## 2025-03-25 ASSESSMENT — SLIT LAMP EXAM - LIDS
COMMENTS: NORMAL
COMMENTS: NORMAL

## 2025-03-25 ASSESSMENT — EXTERNAL EXAM - RIGHT EYE: OD_EXAM: NORMAL

## 2025-03-25 ASSESSMENT — ENCOUNTER SYMPTOMS: EYES NEGATIVE: 1

## 2025-03-25 ASSESSMENT — EXTERNAL EXAM - LEFT EYE: OS_EXAM: NORMAL

## 2025-03-25 NOTE — PROGRESS NOTES
Assessment/Plan   Diagnoses and all orders for this visit:  Fuchs' corneal dystrophy of both eyes  -     Corneal Topography - OU - Both Eyes  -     Endothelial Photo and Cell Count - OU - Both Eyes  2-3+ corneal guttata with more severe findings OD>OS.   Likely causing reduced vision OU and pt complaints of difficulties with lights.   Patient advised that the cells on the back surface of the cornea function as little pumps to regulate the amount of fluid in the cornea.  If too many of these cells are lost, the cornea takes on excess fluid and becomes cloudy, making the vision blurry.  Mild cases may be treated with saline drops to help draw out the excess fluid.  Severe cases require a corneal transplant.    Discussed with pt higher likelihood of needing EK after CE than the general population due FECD. Also, explained delayed vision recovery due to early k edema. The patient understands.     Combined form of age-related cataract, right eye  Combined form of age-related cataract, left eye  Visually significant.  The nature of cataract was discussed with the patient as well as the elective nature of surgery.  The patient was reassured that surgery at a later date does not put the patient at risk for a worse outcome.  It was emphasized that the need for surgery is dictated by the patient`s quality of life as influenced by the cataract.  All the patient`s questions were answered.     Plan would be to proceed with CE and monitor the cornea if she would like to proceed with vision rehab now    Pt would like to think about her options and come back in few weeks with her  to re discuss

## 2025-03-25 NOTE — ASSESSMENT & PLAN NOTE
Patient's goal A1c is < 7%.  Is pt at goal? No, patient's most recent A1c from 9/26/2024 was 11.8%  Patient's SMBGs are mostly at goal, the last couple days though, patient has been having high blood sugars after eating, going up to 200 mg/dL.     Rationale for plan: Patient will increase the dose of Mounjaro to 15 mg weekly. Patient will finish the 4 doses she has left of the 12.5 mg, and will start the 15 mg after that is finished.    Medication Changes:  CONTINUE  Glimepiride 4 mg; take 1 tablet BID  INCREASE  Mounjaro 15 mg; inject 15 mg once weekly on Saturdays    Future Considerations:  Patient is due for a new A1c  If patient starts to have low blood sugars, can decrease glimepiride    Monitoring and Education:  Counseled patient on the goals of treatment  Answered all patient questions and concerns

## 2025-03-25 NOTE — PROGRESS NOTES
Clinical Pharmacy Appointment    Patient ID: Earline Villagran is a 69 y.o. female who presents for Diabetes.    Pt is here for Follow Up appointment.     Referring Provider: Patricia Calderon MD  PCP: Patricia Calderon MD   Last visit with PCP: 3/13/2025   Next visit with PCP: 2025    Subjective   Drug Interactions  No relevant drug interactions were noted.    Medication System Management  Patient's preferred pharmacy: [id number] in [place]  Adherence/Organization: No concerns at this time  Affordability/Accessibility: No concerns at this time    HPI  DIABETES MELLITUS TYPE 2:    Diagnosed with diabetes: 20 years ago.   Known diabetic complications: hyperglycemia.  Does patient follow with Endocrinology: Yes  Last optometry exam: 3/19/2025     Current diabetic medications include:  Mounjaro 12.5 mg; inject 12.5 mg once weekly on   Glimepiride 4 mg; take 1 tablet BID    Clarifications to above regimen: None  Adverse Effects: None    Past diabetic medications include:  Trulicity, Ozempic, Januvia, Glipizide, Metformin    Glucose Readings:  Glucometer/CGM Type: OneTouch Ultra Glucometer  Patient tests BG 2 times per day    Current home BG readings (mg/dL): FBs mg/dL; PPBG: highest around 200 mg/dL   Previous home BG readings (mg/dL): FB mg/dL; PPB mg/dL    Any episodes of hypoglycemia? No, no episodes of hypoglycemia .  Did patient treat episode of hypoglycemia appropriately? N/A  Does the patient have a prescription for ready-to-use Glucagon? Not on insulin    Lifestyle:  Diet: 2 meals/day.   BK: Cereal, banana  LN: Salad, meat, shrimp  Drinks: Water, sparkling water  Exercise: patient recently resigned from her job, so she is not as active as she had been. She wants to start walking more though.  Tobacco history: Former smoker    Secondary Prevention:  Statin? Yes  ACE-I/ARB? No  Aspirin? Yes    Pertinent PMH Review:  PMH of Pancreatitis: No  PMH of Retinopathy:  "No  PMH of Urinary Tract Infections: No  PMH of MTC: No  PMH of MEN2: No  UACR/EGFR in last year?: No  Albumin/Creatine Ratio   Date Value Ref Range Status   06/04/2022 17.5 0.0 - 30.0 ug/mg crt Final     Immunizations:  Influenza? Yes  COVID? Yes  Pneumonia? Most recent is Prevnar 13 from 8/24/2021      Objective   Allergies   Allergen Reactions    Raspberry Shortness of breath    Ciprofloxacin Rash     Itching, Itching    Clindamycin Diarrhea     Other Reaction(s): GI Upset      diarrhea, diarrhea    Green Tea Swelling     Lip swells   W green tea and neto, Lip swells   W green tea and neto    Penicillins Syncope     Other Reaction(s): Other (See Comments), Syncope, Unknown      Other reaction(s): Other (See Comments) PASSED OUT, PASSED OUT      PASSED OUT, PASSED OUT      \"Passed out\"    Sulfamethoxazole-Trimethoprim Hives, Unknown and Rash     Other Reaction(s): Unknown    Phenylephrine-Guaifenesin Palpitations     Other Reaction(s): Other: See Comments      Other reaction(s): Other: See Comments Fast heart rate      Fast heart rate     Social History     Social History Narrative    Not on file      Medication Review  Current Outpatient Medications   Medication Instructions    albuterol 90 mcg/actuation inhaler 2 puffs, inhalation, Every 6 hours PRN    amLODIPine (NORVASC) 5 mg, oral, Daily    atorvastatin (LIPITOR) 40 mg, oral, Daily    blood sugar diagnostic (OneTouch Ultra Test) strip TEST GLUCOSE 2 TO 3 TIMES DAILY    carvedilol (COREG) 3.125 mg, oral, 2 times daily    estradiol (Estrace) 0.01 % (0.1 mg/gram) vaginal cream Apply 0.5g (blueberry size amount) nightly for 2 weeks, then 2 - 3 times per week    gabapentin (NEURONTIN) 100 mg, oral, 3 times daily    glimepiride (AMARYL) 4 mg, oral, 2 times daily Nitrate    hydroCHLOROthiazide (HYDRODIURIL) 25 mg, oral, Daily    hydrOXYzine HCL (Atarax) 10 mg tablet TAKE 1 TABLET BY MOUTH EVERY 8  HOURS IF NEEDED FOR ITCHING    levothyroxine (SYNTHROID, " LEVOXYL) 125 mcg, oral, Daily    meclizine (ANTIVERT) 25 mg, oral, 3 times daily PRN    Mounjaro 15 mg, subcutaneous, Weekly    nortriptyline (PAMELOR) 25 mg, oral, 2 times daily    omeprazole (PRILOSEC) 20 mg, oral, Daily    ondansetron ODT (ZOFRAN-ODT) 4 mg, oral, Every 8 hours PRN    OneTouch Delica Plus Lancet 33 gauge misc USE WITH BLOOD GLUCOSE TEST ONCE DAILY    OneTouch Ultra2 Meter misc 1 DEVICE ONCE DAILY. TEST ONE TIME A DAY. INSULIN DEP? NO E11.9 DM 2    Ubrelvy 50 mg, oral, Daily PRN      Vitals  BP Readings from Last 2 Encounters:   03/13/25 116/82   03/10/25 117/82     BMI Readings from Last 1 Encounters:   03/13/25 37.67 kg/m²      Labs  A1C  Lab Results   Component Value Date    HGBA1C 11.8 (A) 09/26/2024    HGBA1C 10.6 (H) 05/19/2024    HGBA1C 8.8 (H) 12/29/2023     BMP  Lab Results   Component Value Date    CALCIUM 9.1 02/09/2025     (L) 02/09/2025    K 3.9 02/09/2025    CO2 23 02/09/2025    CL 96 (L) 02/09/2025    BUN 25 (H) 02/09/2025    CREATININE 1.44 (H) 02/09/2025    EGFR 39 (L) 02/09/2025     LFTs  Lab Results   Component Value Date    ALT 23 02/09/2025    AST 15 02/09/2025    ALKPHOS 73 02/09/2025    BILITOT 0.6 02/09/2025     FLP  Lab Results   Component Value Date    TRIG 161 (H) 12/29/2023    CHOL 222 (H) 12/29/2023    LDLF 198 (H) 08/09/2023    LDLCALC 140 (H) 12/29/2023    HDL 50.3 12/29/2023     Urine Microalbumin  Lab Results   Component Value Date    MICROALBCREA 17.5 06/04/2022     Weight Management  Wt Readings from Last 3 Encounters:   03/13/25 103 kg (226 lb 6.4 oz)   03/10/25 103 kg (228 lb)   03/10/25 105 kg (231 lb)      There is no height or weight on file to calculate BMI.     Assessment/Plan   Problem List Items Addressed This Visit       Type 2 diabetes mellitus with hyperglycemia, with long-term current use of insulin     Patient's goal A1c is < 7%.  Is pt at goal? No, patient's most recent A1c from 9/26/2024 was 11.8%  Patient's SMBGs are mostly at goal, the last  couple days though, patient has been having high blood sugars after eating, going up to 200 mg/dL.     Rationale for plan: Patient will increase the dose of Mounjaro to 15 mg weekly. Patient will finish the 4 doses she has left of the 12.5 mg, and will start the 15 mg after that is finished.    Medication Changes:  CONTINUE  Glimepiride 4 mg; take 1 tablet BID  INCREASE  Mounjaro 15 mg; inject 15 mg once weekly on Saturdays    Future Considerations:  Patient is due for a new A1c  If patient starts to have low blood sugars, can decrease glimepiride    Monitoring and Education:  Counseled patient on the goals of treatment  Answered all patient questions and concerns         Relevant Medications    tirzepatide (Mounjaro) 15 mg/0.5 mL pen injector    Other Relevant Orders    Referral to Clinical Pharmacy       Clinical Pharmacist follow-up: 4/29/2025, Telehealth visit    Patient is not followed in CCM. (If yes, track minutes under compass emma at each visit)    Continue all meds under the continuation of care with the referring provider and clinical pharmacy team.    Thank you,  Theresa Castañeda, PharmD  Clinical Pharmacist - Primary Care  [phone]  3/25/2025    Verbal consent to manage patient's drug therapy was obtained from the patient. They were informed they may decline to participate or withdraw from participation in pharmacy services at any time.

## 2025-03-28 RX ORDER — ALBUTEROL SULFATE 0.83 MG/ML
3 SOLUTION RESPIRATORY (INHALATION) ONCE
OUTPATIENT
Start: 2025-03-28 | End: 2025-03-28

## 2025-03-28 RX ORDER — ALBUTEROL SULFATE 90 UG/1
4 INHALANT RESPIRATORY (INHALATION) ONCE
OUTPATIENT
Start: 2025-03-28 | End: 2025-03-28

## 2025-03-30 DIAGNOSIS — R42 VERTIGO: ICD-10-CM

## 2025-03-30 RX ORDER — MECLIZINE HYDROCHLORIDE 25 MG/1
25 TABLET ORAL 3 TIMES DAILY PRN
Qty: 30 TABLET | Refills: 1 | Status: SHIPPED | OUTPATIENT
Start: 2025-03-30

## 2025-03-31 ENCOUNTER — APPOINTMENT (OUTPATIENT)
Dept: RADIOLOGY | Facility: HOSPITAL | Age: 70
End: 2025-03-31
Payer: MEDICARE

## 2025-03-31 ENCOUNTER — HOSPITAL ENCOUNTER (EMERGENCY)
Facility: HOSPITAL | Age: 70
Discharge: HOME | End: 2025-03-31
Attending: EMERGENCY MEDICINE
Payer: MEDICARE

## 2025-03-31 ENCOUNTER — HOSPITAL ENCOUNTER (OUTPATIENT)
Dept: RESPIRATORY THERAPY | Facility: HOSPITAL | Age: 70
Discharge: HOME | End: 2025-03-31
Payer: MEDICARE

## 2025-03-31 ENCOUNTER — APPOINTMENT (OUTPATIENT)
Dept: CARDIOLOGY | Facility: HOSPITAL | Age: 70
End: 2025-03-31
Payer: MEDICARE

## 2025-03-31 VITALS
SYSTOLIC BLOOD PRESSURE: 122 MMHG | RESPIRATION RATE: 16 BRPM | WEIGHT: 230 LBS | TEMPERATURE: 97.1 F | HEIGHT: 66 IN | DIASTOLIC BLOOD PRESSURE: 90 MMHG | BODY MASS INDEX: 36.96 KG/M2 | OXYGEN SATURATION: 97 % | HEART RATE: 97 BPM

## 2025-03-31 DIAGNOSIS — R06.02 SOB (SHORTNESS OF BREATH): ICD-10-CM

## 2025-03-31 DIAGNOSIS — J44.9 CHRONIC OBSTRUCTIVE PULMONARY DISEASE, UNSPECIFIED COPD TYPE (MULTI): Primary | ICD-10-CM

## 2025-03-31 LAB
ALBUMIN SERPL BCP-MCNC: 4.1 G/DL (ref 3.4–5)
ALP SERPL-CCNC: 74 U/L (ref 33–136)
ALT SERPL W P-5'-P-CCNC: 14 U/L (ref 7–45)
ANION GAP BLDV CALCULATED.4IONS-SCNC: 7 MMOL/L (ref 10–25)
ANION GAP SERPL CALC-SCNC: 16 MMOL/L (ref 10–20)
AST SERPL W P-5'-P-CCNC: 14 U/L (ref 9–39)
BASE EXCESS BLDV CALC-SCNC: 4.1 MMOL/L (ref -2–3)
BASOPHILS # BLD AUTO: 0.06 X10*3/UL (ref 0–0.1)
BASOPHILS NFR BLD AUTO: 0.6 %
BILIRUB DIRECT SERPL-MCNC: 0.1 MG/DL (ref 0–0.3)
BILIRUB SERPL-MCNC: 0.4 MG/DL (ref 0–1.2)
BNP SERPL-MCNC: 19 PG/ML (ref 0–99)
BODY TEMPERATURE: 37 DEGREES CELSIUS
BUN SERPL-MCNC: 19 MG/DL (ref 6–23)
CA-I BLDV-SCNC: 1.17 MMOL/L (ref 1.1–1.33)
CALCIUM SERPL-MCNC: 9.1 MG/DL (ref 8.6–10.3)
CARDIAC TROPONIN I PNL SERPL HS: 4 NG/L (ref 0–13)
CHLORIDE BLDV-SCNC: 100 MMOL/L (ref 98–107)
CHLORIDE SERPL-SCNC: 98 MMOL/L (ref 98–107)
CO2 SERPL-SCNC: 25 MMOL/L (ref 21–32)
CREAT SERPL-MCNC: 1.57 MG/DL (ref 0.5–1.05)
D DIMER PPP FEU-MCNC: 1386 NG/ML FEU
EGFRCR SERPLBLD CKD-EPI 2021: 35 ML/MIN/1.73M*2
EOSINOPHIL # BLD AUTO: 0.15 X10*3/UL (ref 0–0.7)
EOSINOPHIL NFR BLD AUTO: 1.4 %
ERYTHROCYTE [DISTWIDTH] IN BLOOD BY AUTOMATED COUNT: 13.5 % (ref 11.5–14.5)
GLUCOSE BLDV-MCNC: 177 MG/DL (ref 74–99)
GLUCOSE SERPL-MCNC: 195 MG/DL (ref 74–99)
HCO3 BLDV-SCNC: 29.2 MMOL/L (ref 22–26)
HCT VFR BLD AUTO: 38 % (ref 36–46)
HCT VFR BLD EST: 37 % (ref 36–46)
HGB BLD-MCNC: 12.6 G/DL (ref 12–16)
HGB BLDV-MCNC: 12.2 G/DL (ref 12–16)
IMM GRANULOCYTES # BLD AUTO: 0.1 X10*3/UL (ref 0–0.7)
IMM GRANULOCYTES NFR BLD AUTO: 0.9 % (ref 0–0.9)
INHALED O2 CONCENTRATION: 21 %
LACTATE BLDV-SCNC: 1.4 MMOL/L (ref 0.4–2)
LACTATE BLDV-SCNC: 2.8 MMOL/L (ref 0.4–2)
LYMPHOCYTES # BLD AUTO: 2.07 X10*3/UL (ref 1.2–4.8)
LYMPHOCYTES NFR BLD AUTO: 19.3 %
MAGNESIUM SERPL-MCNC: 1.71 MG/DL (ref 1.6–2.4)
MCH RBC QN AUTO: 30.1 PG (ref 26–34)
MCHC RBC AUTO-ENTMCNC: 33.2 G/DL (ref 32–36)
MCV RBC AUTO: 91 FL (ref 80–100)
MONOCYTES # BLD AUTO: 0.69 X10*3/UL (ref 0.1–1)
MONOCYTES NFR BLD AUTO: 6.4 %
NEUTROPHILS # BLD AUTO: 7.68 X10*3/UL (ref 1.2–7.7)
NEUTROPHILS NFR BLD AUTO: 71.4 %
NRBC BLD-RTO: 0 /100 WBCS (ref 0–0)
OXYHGB MFR BLDV: 45.6 % (ref 45–75)
PCO2 BLDV: 45 MM HG (ref 41–51)
PH BLDV: 7.42 PH (ref 7.33–7.43)
PLATELET # BLD AUTO: 291 X10*3/UL (ref 150–450)
PO2 BLDV: 35 MM HG (ref 35–45)
POTASSIUM BLDV-SCNC: 3.8 MMOL/L (ref 3.5–5.3)
POTASSIUM SERPL-SCNC: 4 MMOL/L (ref 3.5–5.3)
PROT SERPL-MCNC: 7.1 G/DL (ref 6.4–8.2)
RBC # BLD AUTO: 4.18 X10*6/UL (ref 4–5.2)
SAO2 % BLDV: 46 % (ref 45–75)
SODIUM BLDV-SCNC: 132 MMOL/L (ref 136–145)
SODIUM SERPL-SCNC: 135 MMOL/L (ref 136–145)
WBC # BLD AUTO: 10.8 X10*3/UL (ref 4.4–11.3)

## 2025-03-31 PROCEDURE — 82947 ASSAY GLUCOSE BLOOD QUANT: CPT | Mod: 59 | Performed by: EMERGENCY MEDICINE

## 2025-03-31 PROCEDURE — 93005 ELECTROCARDIOGRAM TRACING: CPT

## 2025-03-31 PROCEDURE — 84075 ASSAY ALKALINE PHOSPHATASE: CPT | Performed by: EMERGENCY MEDICINE

## 2025-03-31 PROCEDURE — 36415 COLL VENOUS BLD VENIPUNCTURE: CPT | Performed by: EMERGENCY MEDICINE

## 2025-03-31 PROCEDURE — 71045 X-RAY EXAM CHEST 1 VIEW: CPT

## 2025-03-31 PROCEDURE — 94010 BREATHING CAPACITY TEST: CPT

## 2025-03-31 PROCEDURE — 83880 ASSAY OF NATRIURETIC PEPTIDE: CPT | Performed by: EMERGENCY MEDICINE

## 2025-03-31 PROCEDURE — 85018 HEMOGLOBIN: CPT | Mod: 59 | Performed by: EMERGENCY MEDICINE

## 2025-03-31 PROCEDURE — 85025 COMPLETE CBC W/AUTO DIFF WBC: CPT | Performed by: EMERGENCY MEDICINE

## 2025-03-31 PROCEDURE — 94010 BREATHING CAPACITY TEST: CPT | Performed by: INTERNAL MEDICINE

## 2025-03-31 PROCEDURE — 99285 EMERGENCY DEPT VISIT HI MDM: CPT | Mod: 25 | Performed by: EMERGENCY MEDICINE

## 2025-03-31 PROCEDURE — 2550000001 HC RX 255 CONTRASTS: Performed by: EMERGENCY MEDICINE

## 2025-03-31 PROCEDURE — 71045 X-RAY EXAM CHEST 1 VIEW: CPT | Performed by: RADIOLOGY

## 2025-03-31 PROCEDURE — 71275 CT ANGIOGRAPHY CHEST: CPT

## 2025-03-31 PROCEDURE — 85379 FIBRIN DEGRADATION QUANT: CPT | Performed by: EMERGENCY MEDICINE

## 2025-03-31 PROCEDURE — 84484 ASSAY OF TROPONIN QUANT: CPT | Performed by: EMERGENCY MEDICINE

## 2025-03-31 PROCEDURE — 83605 ASSAY OF LACTIC ACID: CPT | Mod: 91 | Performed by: EMERGENCY MEDICINE

## 2025-03-31 PROCEDURE — 83735 ASSAY OF MAGNESIUM: CPT | Performed by: EMERGENCY MEDICINE

## 2025-03-31 RX ORDER — ALBUTEROL SULFATE 90 UG/1
1-2 INHALANT RESPIRATORY (INHALATION) EVERY 6 HOURS PRN
Qty: 18 G | Refills: 0 | Status: SHIPPED | OUTPATIENT
Start: 2025-03-31 | End: 2025-04-30

## 2025-03-31 RX ORDER — PREDNISONE 20 MG/1
40 TABLET ORAL DAILY
Qty: 10 TABLET | Refills: 0 | Status: SHIPPED | OUTPATIENT
Start: 2025-03-31 | End: 2025-04-05

## 2025-03-31 RX ADMIN — IOHEXOL 75 ML: 350 INJECTION, SOLUTION INTRAVENOUS at 15:12

## 2025-03-31 ASSESSMENT — PAIN DESCRIPTION - LOCATION: LOCATION: CHEST

## 2025-03-31 ASSESSMENT — PAIN SCALES - GENERAL
PAINLEVEL_OUTOF10: 6
PAINLEVEL_OUTOF10: 3

## 2025-03-31 ASSESSMENT — PAIN DESCRIPTION - DESCRIPTORS: DESCRIPTORS: DISCOMFORT

## 2025-03-31 ASSESSMENT — PAIN - FUNCTIONAL ASSESSMENT: PAIN_FUNCTIONAL_ASSESSMENT: 0-10

## 2025-03-31 ASSESSMENT — PAIN DESCRIPTION - PAIN TYPE: TYPE: ACUTE PAIN

## 2025-03-31 NOTE — DISCHARGE INSTRUCTIONS
Your CT scan also shows a coronary calcifications swab recommend that you see your primary doctor and a cardiologist for follow-up also regarding calcifications in your coronary arteries of the heart.

## 2025-03-31 NOTE — ED TRIAGE NOTES
Pt comes in with a c/o SOB with slight chest pain that started during her breathing test. Past smoker. Has not smoked for 25 years. SOB worse on exertion. Not on blood thinners. Denies any PE or DVT. History of blood clot in the brain 50 years ago. No hx of asthma or COPD that she is aware of.

## 2025-03-31 NOTE — PROGRESS NOTES
Patient has exertional dyspnea, D-dimer elevated, benefit of CT angio heart with the risks of low GFR.

## 2025-03-31 NOTE — ED PROVIDER NOTES
HPI   Chief Complaint   Patient presents with    Shortness of Breath       HPI: []  69-year-old  female with a history of hypertension, diabetes, CKD, ex-tobacco use comes in with shortness of breath.  She was undergoing pulm function tests became short of breath tachycardic and she was sent to the ED for evaluation patient denies any chest pain pressure heaviness.  Has a cough nonproductive.  She recently had bronchitis.  She denies any PND orthopnea.  She denies any hemoptysis.  She denies any fever or chills.  No night night sweats.  No cardiac stents.    Past history: Hypertension, diabetes, CKD, COPD, dyslipidemia, ex-tobacco use  Social ex-tobacco use denies current tobacco alcohol drug abuse.  REVIEW OF SYSTEMS:    GENERAL.: No weight loss, fatigue, anorexia, insomnia, fever.    EYES: No vision loss, double vision, drainage, eye pain.    ENT: No pharyngitis, dry mouth.    CARDIOPULMONARY: No chest pain, palpitations, syncope, near syncope.  Positive for shortness of breath, cough, hemoptysis.    GI: No abdominal pain, change in bowel habits, melena, hematemesis, hematochezia, nausea, vomiting, diarrhea.    : No discharge, dysuria, frequency, urgency, hematuria.    MS: No limb pain, joint pain, joint swelling.    SKIN: No rashes.    PSYCH: No depression, anxiety, suicidality, homicidality.    Review of systems is otherwise negative unless stated above or in history of present illness.  Social history, family history, allergies reviewed.  PHYSICAL EXAM:    GENERAL: Vitals noted, no distress. Alert and oriented  x 3. Non-toxic.      EENT: TMs clear. Posterior oropharynx unremarkable. No meningismus. No LAD.     NECK: Supple. Nontender. No midline tenderness.     CARDIAC: Tachycardic, regular, rate, rhythm. No murmurs rubs or gallops. No JVD    PULMONARY: Lungs clear bilaterally with good aeration. No wheezes rales or rhonchi. No respiratory distress.     ABDOMEN: Soft, nonsurgical. Nontender. No  peritoneal signs. Normoactive bowel sounds. No pulsatile masses.     EXTREMITIES: No peripheral edema. Negative Homans bilaterally, no cords.  2+ bounding pulses well-perfused    SKIN: No rash. Intact.     NEURO: No focal neurologic deficits, NIH score of 0. Cranial nerves normal as tested from II through XII.     MEDICAL DECISION MAKING:  EKG on my interpretation shows a sinus tachycardia normal axis rate about 105 with no acute ischemic changes.  CBC with shows leukocytosis chemistry shows stable CKD BNP is normal troponin negative chest x-ray unremarkable CT angio shows no pulmonary embolism and did show some air trapping and some coronary calcifications venous blood gas shows no hypercapnia.  RSV COVID influenza negative.    Treatment in the ED: IV established patient a cardiac monitor given IV fluids.  ED course: Patient remains a stable hemodynamic.  Repeat assessment she is afebrile normotensive no tachycardia or hypoxia.  Impression: #1 COPD  Plan set MDM: 69-year-old female history of questional COPD ex-tobacco use comes in with shortness of breath while she was having a pulmonary function test done, currently she stable hemodynamic.  Low concern for pulm embolism Ruled out based on a negative CT angio she has some abnormal x-ray finding of the chest CT including coronary calcifications and air trapping which goes along with her history of COPD, currently her EKG is unremarkable she has no chest pain her troponin is negative so my suspicion for STEMI or NSTEMI or ACS is low.  Patient made aware of the abnormal findings will be discharged home with prednisone advised outpatient follow with primary doctor and pulmonologist and cardiology regarding more different diagnosis of COPD, regarding her coronary calcifications and further care.  I will discharge patient home on ProAir MDI and prednisone.              Patient History   Past Medical History:   Diagnosis Date    Allergic     Arthritis     Cluster  headache     Concussion with loss of consciousness of 30 minutes or less, subsequent encounter 2021    Concussion with loss of consciousness of 30 minutes or less, subsequent encounter    CTS (carpal tunnel syndrome) 3/2000    Diabetes mellitus (Multi) 2000    GERD (gastroesophageal reflux disease)     Herpes zoster 2024    Hypertension 2005    Migraine 21    Personal history of other diseases of the circulatory system     History of hypertension    Personal history of other specified conditions     History of seizure    Personal history of other specified conditions 2020    History of vertigo    Seizures (Multi)     Stroke (Multi) 1976     Past Surgical History:   Procedure Laterality Date    BREAST BIOPSY      HERNIA REPAIR  Baby    HYSTERECTOMY  Feb22    OTHER SURGICAL HISTORY  2021    Subtotal thyroidectomy     Family History   Problem Relation Name Age of Onset    Hypertension Father Dylan Ruelas     Cancer Maternal Grandmother Kim rowland     Breast cancer Cousin      Ovarian cancer Cousin      Glaucoma Neg Hx      Macular degeneration Neg Hx       Social History     Tobacco Use    Smoking status: Former     Current packs/day: 0.00     Average packs/day: 0.3 packs/day for 15.0 years (3.8 ttl pk-yrs)     Types: Cigarettes     Quit date: 10/19/1999     Years since quittin.4     Passive exposure: Never    Smokeless tobacco: Never    Tobacco comments:     Over 25 years ago   Vaping Use    Vaping status: Never Used   Substance Use Topics    Alcohol use: Not Currently    Drug use: Never       Physical Exam   ED Triage Vitals [25 0822]   Temperature Heart Rate Respirations BP   36.3 °C (97.3 °F) (!) 125 20 133/82      Pulse Ox Temp Source Heart Rate Source Patient Position   99 % Oral Monitor Sitting      BP Location FiO2 (%)     Left arm --       Physical Exam      ED Course & [facility]   ED Course as of 03/31/25 1815   Mon Mar 31, 2025   180 BC with shows no  leukocytosis chemistry shows stable CKD LFTs unremarkable BNP is normal troponin negative EKG unremarkable CT angio of the chest shows some air trapping, and severe coronary calcifications, patient be discharged home with prednisone and ProAir MDI, advised urgent follow with her primary doctor her pulmonologist and cardiology with strict and precaution. [MT]      ED Course User Index  [MT] Dawn Tsang MD         Diagnoses as of 03/31/25 1815   Chronic obstructive pulmonary disease, unspecified COPD type (Multi)                 No data recorded     [place] Coma Scale Score: 15 (03/31/25 0847 : Ailin Santos, RN)                           Medical Decision Making      Procedure  Procedures     Dawn Tsang MD  03/31/25 1818

## 2025-03-31 NOTE — ED NOTES
Pt to ED room 31, with c/o increased SOB and chest discomfort. Pt. Was going to obtain pulmonary function test this am when she noticed her breathing was more labored. Pt. Denies pulmonary history. Pt. C/o dizziness with hx of vertigo, took meclizine this morning. Placed on cardiac monitor, intermittent BP and SPO2.      Ailin Santos RN  03/31/25 [id number]

## 2025-04-01 ENCOUNTER — OFFICE VISIT (OUTPATIENT)
Dept: PRIMARY CARE | Facility: CLINIC | Age: 70
End: 2025-04-01
Payer: MEDICARE

## 2025-04-01 VITALS
HEIGHT: 66 IN | BODY MASS INDEX: 36.64 KG/M2 | OXYGEN SATURATION: 97 % | DIASTOLIC BLOOD PRESSURE: 82 MMHG | WEIGHT: 228 LBS | HEART RATE: 119 BPM | TEMPERATURE: 96.6 F | SYSTOLIC BLOOD PRESSURE: 120 MMHG

## 2025-04-01 DIAGNOSIS — J44.9 CHRONIC OBSTRUCTIVE PULMONARY DISEASE, UNSPECIFIED COPD TYPE (MULTI): Primary | ICD-10-CM

## 2025-04-01 PROCEDURE — 99213 OFFICE O/P EST LOW 20 MIN: CPT | Performed by: FAMILY MEDICINE

## 2025-04-01 PROCEDURE — 1160F RVW MEDS BY RX/DR IN RCRD: CPT | Performed by: FAMILY MEDICINE

## 2025-04-01 PROCEDURE — 3008F BODY MASS INDEX DOCD: CPT | Performed by: FAMILY MEDICINE

## 2025-04-01 PROCEDURE — 1123F ACP DISCUSS/DSCN MKR DOCD: CPT | Performed by: FAMILY MEDICINE

## 2025-04-01 PROCEDURE — 3074F SYST BP LT 130 MM HG: CPT | Performed by: FAMILY MEDICINE

## 2025-04-01 PROCEDURE — 1159F MED LIST DOCD IN RCRD: CPT | Performed by: FAMILY MEDICINE

## 2025-04-01 PROCEDURE — 3079F DIAST BP 80-89 MM HG: CPT | Performed by: FAMILY MEDICINE

## 2025-04-01 ASSESSMENT — ENCOUNTER SYMPTOMS
OCCASIONAL FEELINGS OF UNSTEADINESS: 0
DEPRESSION: 0
LOSS OF SENSATION IN FEET: 0

## 2025-04-01 NOTE — PROGRESS NOTES
"Subjective   Patient ID: Earline Villagran is a 69 y.o. female who presents for Hospital Follow-up (Trouble breathing).  Patient presents for follow up after ED visit yesterday, 3/31/25.  She had been doing a PFT to evaluate SOB with exertion, intermittent wheezing.  The test  was aborted, since couldn't breath out well enough and was having chest pain.  She ended up going to the ED after she left the facility.  EKG showed sinus tachycardia with rate 105, no ischemic changes.  Troponin, BNP normal.  CXR unremarkable.  CT angio no PE, did show some air trapping and coronary calcifications.  RSV, covid, flu negative.  She was discharged home with cardiology and pulmonary follow up.    Has been having SOB for about a month.  Has history of smoking.  Appointment with pulmonary 5/12/25, with cardio 4/4/25.    Saw ophthalmologist and diagnosed with Fuch's and cataracts.  Needs to have corneal transplant.    Feels she has not been the same since she had the head injury.  Having vertigo, headaches, as well as the other recent medical issues.        Review of Systems    Objective   /82 (BP Location: Right arm, Patient Position: Sitting, BP Cuff Size: Large adult)   Pulse (!) 119   Temp 35.9 °C (96.6 °F) (Temporal)   Ht 1.676 m (5' 6\")   Wt 103 kg (228 lb)   SpO2 97%   BMI 36.80 kg/m²     Physical Exam  Vitals reviewed.   Constitutional:       Appearance: Normal appearance.   Cardiovascular:      Rate and Rhythm: Regular rhythm. Tachycardia present.      Heart sounds: No murmur heard.  Pulmonary:      Effort: Pulmonary effort is normal.      Breath sounds: Normal breath sounds.   Musculoskeletal:      Right lower leg: No edema.      Left lower leg: No edema.   Neurological:      Mental Status: She is alert.   Psychiatric:         Mood and Affect: Mood normal.         Thought Content: Thought content normal.           Assessment/Plan   Problem List Items Addressed This Visit    None  Visit Diagnoses       Chronic " obstructive pulmonary disease, unspecified COPD type (Multi)    -  Primary    Relevant Medications    fluticasone-umeclidin-vilanter (Trelegy Ellipta) 100-62.5-25 mcg blister with device

## 2025-04-02 LAB
ATRIAL RATE: 123 BPM
P AXIS: 43 DEGREES
P OFFSET: 187 MS
P ONSET: 138 MS
PR INTERVAL: 164 MS
Q ONSET: 220 MS
QRS COUNT: 20 BEATS
QRS DURATION: 90 MS
QT INTERVAL: 314 MS
QTC CALCULATION(BAZETT): 449 MS
QTC FREDERICIA: 398 MS
R AXIS: 56 DEGREES
T AXIS: 40 DEGREES
T OFFSET: 377 MS
VENTRICULAR RATE: 123 BPM

## 2025-04-02 RX ORDER — FLUTICASONE FUROATE, UMECLIDINIUM BROMIDE AND VILANTEROL TRIFENATATE 100; 62.5; 25 UG/1; UG/1; UG/1
1 POWDER RESPIRATORY (INHALATION) DAILY
Qty: 28 EACH | Refills: 0 | COMMUNITY
Start: 2025-04-02

## 2025-04-02 NOTE — PATIENT INSTRUCTIONS
Follow up after ED visit for chest pain and shortness of breath while doing PFT testing.  PFT aborted early due to symptoms.  Will be following up with pulmonary on 5/12/25 and cardiology on 4/4/25.  In meantime, will do trial of Trelegy 100, 1 puff daily and see if that makes a difference in symptoms.   (Sample given).

## 2025-04-04 ENCOUNTER — HOSPITAL ENCOUNTER (OUTPATIENT)
Dept: CARDIOLOGY | Facility: CLINIC | Age: 70
Discharge: HOME | End: 2025-04-04
Payer: MEDICARE

## 2025-04-04 ENCOUNTER — OFFICE VISIT (OUTPATIENT)
Dept: CARDIOLOGY | Facility: CLINIC | Age: 70
End: 2025-04-04
Payer: MEDICARE

## 2025-04-04 VITALS
SYSTOLIC BLOOD PRESSURE: 113 MMHG | DIASTOLIC BLOOD PRESSURE: 76 MMHG | WEIGHT: 228.25 LBS | HEIGHT: 66 IN | BODY MASS INDEX: 36.68 KG/M2 | HEART RATE: 106 BPM

## 2025-04-04 DIAGNOSIS — R00.0 TACHYCARDIA: Primary | ICD-10-CM

## 2025-04-04 DIAGNOSIS — R00.0 TACHYCARDIA: ICD-10-CM

## 2025-04-04 DIAGNOSIS — R06.09 DOE (DYSPNEA ON EXERTION): ICD-10-CM

## 2025-04-04 LAB
ATRIAL RATE: 112 BPM
BODY SURFACE AREA: 2.2 M2
P AXIS: 62 DEGREES
P OFFSET: 200 MS
P ONSET: 139 MS
PR INTERVAL: 160 MS
Q ONSET: 219 MS
QRS COUNT: 18 BEATS
QRS DURATION: 88 MS
QT INTERVAL: 326 MS
QTC CALCULATION(BAZETT): 444 MS
QTC FREDERICIA: 401 MS
R AXIS: 86 DEGREES
T AXIS: 57 DEGREES
T OFFSET: 382 MS
VENTRICULAR RATE: 112 BPM

## 2025-04-04 PROCEDURE — 1036F TOBACCO NON-USER: CPT | Performed by: NURSE PRACTITIONER

## 2025-04-04 PROCEDURE — 1126F AMNT PAIN NOTED NONE PRSNT: CPT | Performed by: NURSE PRACTITIONER

## 2025-04-04 PROCEDURE — 1159F MED LIST DOCD IN RCRD: CPT | Performed by: NURSE PRACTITIONER

## 2025-04-04 PROCEDURE — 93246 EXT ECG>7D<15D RECORDING: CPT

## 2025-04-04 PROCEDURE — 93005 ELECTROCARDIOGRAM TRACING: CPT | Performed by: NURSE PRACTITIONER

## 2025-04-04 PROCEDURE — 99213 OFFICE O/P EST LOW 20 MIN: CPT | Performed by: NURSE PRACTITIONER

## 2025-04-04 PROCEDURE — 99203 OFFICE O/P NEW LOW 30 MIN: CPT | Performed by: NURSE PRACTITIONER

## 2025-04-04 PROCEDURE — 3078F DIAST BP <80 MM HG: CPT | Performed by: NURSE PRACTITIONER

## 2025-04-04 PROCEDURE — 1160F RVW MEDS BY RX/DR IN RCRD: CPT | Performed by: NURSE PRACTITIONER

## 2025-04-04 PROCEDURE — 1123F ACP DISCUSS/DSCN MKR DOCD: CPT | Performed by: NURSE PRACTITIONER

## 2025-04-04 PROCEDURE — 3074F SYST BP LT 130 MM HG: CPT | Performed by: NURSE PRACTITIONER

## 2025-04-04 PROCEDURE — 3008F BODY MASS INDEX DOCD: CPT | Performed by: NURSE PRACTITIONER

## 2025-04-04 RX ORDER — REGADENOSON 0.08 MG/ML
0.4 INJECTION, SOLUTION INTRAVENOUS
OUTPATIENT
Start: 2025-04-04

## 2025-04-04 RX ORDER — AMINOPHYLLINE 25 MG/ML
125 INJECTION, SOLUTION INTRAVENOUS ONCE AS NEEDED
OUTPATIENT
Start: 2025-04-04

## 2025-04-04 ASSESSMENT — PATIENT HEALTH QUESTIONNAIRE - PHQ9
SUM OF ALL RESPONSES TO PHQ9 QUESTIONS 1 AND 2: 0
2. FEELING DOWN, DEPRESSED OR HOPELESS: NOT AT ALL
1. LITTLE INTEREST OR PLEASURE IN DOING THINGS: NOT AT ALL

## 2025-04-04 ASSESSMENT — ENCOUNTER SYMPTOMS
OCCASIONAL FEELINGS OF UNSTEADINESS: 0
LOSS OF SENSATION IN FEET: 0
DEPRESSION: 0

## 2025-04-04 ASSESSMENT — PAIN SCALES - GENERAL: PAINLEVEL_OUTOF10: 0-NO PAIN

## 2025-04-04 NOTE — PROGRESS NOTES
"Subjective   Earline Villagran is a 69 y.o. female.    Chief Complaint:  New Patient Visit (Rapid Heart rate)    HPI    ROS    Objective   Physical Exam    Lab Review:   {Recent labs:11073::\"not applicable\"}    Assessment/Plan   The encounter diagnosis was Tachycardia.  " device Inhale 1 puff once daily. 28 each 0    gabapentin (Neurontin) 100 mg capsule Take 1 capsule (100 mg) by mouth 3 times a day. 270 capsule 1    glimepiride (AmaryL) 4 mg tablet Take 1 tablet (4 mg) by mouth in the morning and at bedtime. 180 tablet 1    hydroCHLOROthiazide (HYDRODiuril) 25 mg tablet Take 1 tablet (25 mg) by mouth once daily. 90 tablet 1    hydrOXYzine HCL (Atarax) 10 mg tablet TAKE 1 TABLET BY MOUTH EVERY 8  HOURS IF NEEDED FOR ITCHING 270 tablet 0    levothyroxine (Synthroid, Levoxyl) 125 mcg tablet TAKE 1 TABLET BY MOUTH ONCE  DAILY 90 tablet 0    meclizine (Antivert) 25 mg tablet Take 1 tablet (25 mg) by mouth 3 times a day as needed for dizziness. 30 tablet 1    nortriptyline (Pamelor) 25 mg capsule TAKE 1 CAPSULE (25 MG) BY MOUTH 2 TIMES A DAY. 180 capsule 3    omeprazole (PriLOSEC) 20 mg tablet,delayed release (DR/EC) EC tablet Take 1 tablet (20 mg) by mouth once daily. 90 tablet 1    ondansetron ODT (Zofran-ODT) 4 mg disintegrating tablet Dissolve 1 tablet (4 mg) in the mouth every 8 hours if needed for nausea or vomiting for up to 90 doses. 30 tablet 2    OneTouch Delica Plus Lancet 33 gauge misc USE WITH BLOOD GLUCOSE TEST ONCE DAILY      OneTouch Ultra2 Meter misc 1 DEVICE ONCE DAILY. TEST ONE TIME A DAY. INSULIN DEP? NO E11.9 DM 2      [] predniSONE (Deltasone) 20 mg tablet Take 2 tablets (40 mg) by mouth once daily for 5 days. 10 tablet 0    tirzepatide (Mounjaro) 15 mg/0.5 mL pen injector Inject 15 mg under the skin 1 (one) time per week. 6 mL 3    ubrogepant (Ubrelvy) 50 mg tablet Take 1 tablet (50 mg) by mouth once daily as needed (for migraine, may repeat dose in 2 hours if needed, no more than 2 doses (100 mg) in 24 hours) for up to 192 doses. 16 tablet 11     No current facility-administered medications on file prior to visit.         Review of Systems   Constitutional: Negative for diaphoresis, fever and malaise/fatigue.   HENT:  Negative for congestion and sore  throat.    Eyes:  Negative for blurred vision and double vision.   Cardiovascular:  Positive for dyspnea on exertion and irregular heartbeat. Negative for chest pain, leg swelling, near-syncope, orthopnea, palpitations, paroxysmal nocturnal dyspnea and syncope.   Respiratory:  Negative for cough, hemoptysis, snoring and sputum production.    Hematologic/Lymphatic: Negative for bleeding problem.   Skin:  Negative for rash.   Musculoskeletal:  Negative for falls, joint pain and myalgias.   Gastrointestinal:  Negative for abdominal pain, diarrhea, nausea and vomiting.   Neurological:  Negative for dizziness, headaches, light-headedness and weakness.   All other systems reviewed and are negative.      Objective   Constitutional:       Appearance: Healthy appearance. Not in distress.   Eyes:      Conjunctiva/sclera: Conjunctivae normal.      Pupils: Pupils are equal, round, and reactive to light.   HENT:    Mouth/Throat:      Pharynx: Oropharynx is clear.   Pulmonary:      Effort: Pulmonary effort is normal.      Breath sounds: Examination of the right-lower field reveals decreased breath sounds. Examination of the left-lower field reveals decreased breath sounds. Decreased breath sounds present. No wheezing. No rhonchi. No rales.   Cardiovascular:      PMI at left midclavicular line. Tachycardia present. Regular rhythm.      Murmurs: There is no murmur.      No gallop.    Pulses:     Intact distal pulses.   Edema:     Peripheral edema absent.   Abdominal:      General: Bowel sounds are normal.      Palpations: Abdomen is soft.      Tenderness: There is no abdominal tenderness.   Musculoskeletal: Normal range of motion.         General: No tenderness. Skin:     General: Skin is warm and dry.   Neurological:      General: No focal deficit present.      Mental Status: Alert and oriented to person, place and time.         Lab Review:   Admission on 03/31/2025, Discharged on 03/31/2025   Component Date Value    Ventricular  Rate 03/31/2025 123     Atrial Rate 03/31/2025 123     NE Interval 03/31/2025 164     QRS Duration 03/31/2025 90     QT Interval 03/31/2025 314     QTC Calculation(Bazett) 03/31/2025 449     P Axis 03/31/2025 43     R Axis 03/31/2025 56     T Axis 03/31/2025 40     QRS Count 03/31/2025 20     Q Onset 03/31/2025 220     P Onset 03/31/2025 138     P Offset 03/31/2025 187     T Offset 03/31/2025 377     QTC Fredericia 03/31/2025 398     WBC 03/31/2025 10.8     nRBC 03/31/2025 0.0     RBC 03/31/2025 4.18     Hemoglobin 03/31/2025 12.6     Hematocrit 03/31/2025 38.0     MCV 03/31/2025 91     MCH 03/31/2025 30.1     MCHC 03/31/2025 33.2     RDW 03/31/2025 13.5     Platelets 03/31/2025 291     Neutrophils % 03/31/2025 71.4     Immature Granulocytes %,* 03/31/2025 0.9     Lymphocytes % 03/31/2025 19.3     Monocytes % 03/31/2025 6.4     Eosinophils % 03/31/2025 1.4     Basophils % 03/31/2025 0.6     Neutrophils Absolute 03/31/2025 7.68     Immature Granulocytes Ab* 03/31/2025 0.10     Lymphocytes Absolute 03/31/2025 2.07     Monocytes Absolute 03/31/2025 0.69     Eosinophils Absolute 03/31/2025 0.15     Basophils Absolute 03/31/2025 0.06     Glucose 03/31/2025 195 (H)     Sodium 03/31/2025 135 (L)     Potassium 03/31/2025 4.0     Chloride 03/31/2025 98     Bicarbonate 03/31/2025 25     Anion Gap 03/31/2025 16     Urea Nitrogen 03/31/2025 19     Creatinine 03/31/2025 1.57 (H)     eGFR 03/31/2025 35 (L)     Calcium 03/31/2025 9.1     Magnesium 03/31/2025 1.71     Albumin 03/31/2025 4.1     Bilirubin, Total 03/31/2025 0.4     Bilirubin, Direct 03/31/2025 0.1     Alkaline Phosphatase 03/31/2025 74     ALT 03/31/2025 14     AST 03/31/2025 14     Total Protein 03/31/2025 7.1     Troponin I, High Sensiti* 03/31/2025 4     BNP 03/31/2025 19     POCT pH, Venous 03/31/2025 7.42     POCT pCO2, Venous 03/31/2025 45     POCT pO2, Venous 03/31/2025 35     POCT SO2, Venous 03/31/2025 46     POCT Oxy Hemoglobin, Dima* 03/31/2025 45.6      POCT Hematocrit Calculat* 03/31/2025 37.0     POCT Sodium, Venous 03/31/2025 132 (L)     POCT Potassium, Venous 03/31/2025 3.8     POCT Chloride, Venous 03/31/2025 100     POCT Ionized Calicum, Ve* 03/31/2025 1.17     POCT Glucose, Venous 03/31/2025 177 (H)     POCT Lactate, Venous 03/31/2025 2.8 (H)     POCT Base Excess, Venous 03/31/2025 4.1 (H)     POCT HCO3 Calculated, Ve* 03/31/2025 29.2 (H)     POCT Hemoglobin, Venous 03/31/2025 12.2     POCT Anion Gap, Venous 03/31/2025 7.0 (L)     Patient Temperature 03/31/2025 37.0     FiO2 03/31/2025 21     D-Dimer, Quantitative VT* 03/31/2025 1,386 (H)     POCT Lactate, Venous 03/31/2025 1.4    Office Visit on 03/25/2025   Component Date Value    Central Cornea Thickness* 03/25/2025 577     Central Cornea Thickness* 03/25/2025 608     Cells Counted (OD) 03/25/2025 Unable    Office Visit on 03/13/2025   Component Date Value    TSH W/REFLEX TO FT4 03/13/2025 10.55 (H)     T4, FREE 03/13/2025 1.5    Office Visit on 03/07/2025   Component Date Value    POC Color, Urine 03/07/2025 Yellow     POC Appearance, Urine 03/07/2025 Clear     POC Glucose, Urine 03/07/2025 NEGATIVE     POC Bilirubin, Urine 03/07/2025 NEGATIVE     POC Ketones, Urine 03/07/2025 NEGATIVE     POC Specific Gravity, Ur* 03/07/2025 1.020     POC Blood, Urine 03/07/2025 NEGATIVE     POC PH, Urine 03/07/2025 6.0     POC Protein, Urine 03/07/2025 TRACE (A)     POC Urobilinogen, Urine 03/07/2025 0.2     Poc Nitrite, Urine 03/07/2025 NEGATIVE     POC Leukocytes, Urine 03/07/2025 TRACE (A)    Admission on 02/09/2025, Discharged on 02/09/2025   Component Date Value    Ventricular Rate 02/09/2025 120     Atrial Rate 02/09/2025 120     CA Interval 02/09/2025 158     QRS Duration 02/09/2025 80     QT Interval 02/09/2025 318     QTC Calculation(Bazett) 02/09/2025 449     P Axis 02/09/2025 64     R [place] 02/09/2025 86     T Axis 02/09/2025 55     QRS Count 02/09/2025 19     Q Onset 02/09/2025 220     P Onset 02/09/2025 141      P Offset 02/09/2025 200     T Offset 02/09/2025 379     QTC Fredericia 02/09/2025 400     WBC 02/09/2025 12.2 (H)     nRBC 02/09/2025 0.0     RBC 02/09/2025 4.90     Hemoglobin 02/09/2025 14.6     Hematocrit 02/09/2025 43.3     MCV 02/09/2025 88     MCH 02/09/2025 29.8     MCHC 02/09/2025 33.7     RDW 02/09/2025 13.0     Platelets 02/09/2025 347     Neutrophils % 02/09/2025 63.5     Immature Granulocytes %,* 02/09/2025 2.5 (H)     Lymphocytes % 02/09/2025 25.7     Monocytes % 02/09/2025 6.6     Eosinophils % 02/09/2025 1.0     Basophils % 02/09/2025 0.7     Neutrophils Absolute 02/09/2025 7.73 (H)     Immature Granulocytes Ab* 02/09/2025 0.30     Lymphocytes Absolute 02/09/2025 3.12     Monocytes Absolute 02/09/2025 0.80     Eosinophils Absolute 02/09/2025 0.12     Basophils Absolute 02/09/2025 0.08     Glucose 02/09/2025 172 (H)     Sodium 02/09/2025 134 (L)     Potassium 02/09/2025 3.9     Chloride 02/09/2025 96 (L)     Bicarbonate 02/09/2025 23     Anion Gap 02/09/2025 19     Urea Nitrogen 02/09/2025 25 (H)     Creatinine 02/09/2025 1.44 (H)     eGFR 02/09/2025 39 (L)     Calcium 02/09/2025 9.1     Albumin 02/09/2025 4.0     Alkaline Phosphatase 02/09/2025 73     Total Protein 02/09/2025 7.9     AST 02/09/2025 15     Bilirubin, Total 02/09/2025 0.6     ALT 02/09/2025 23     Magnesium 02/09/2025 1.81     BNP 02/09/2025 9     Troponin I, High Sensiti* 02/09/2025 <3     D-Dimer Non VTE, Quant (* 02/09/2025 880 (H)     Troponin I, High Sensiti* 02/09/2025 <3     Flu A Result 02/09/2025 Not Detected     Flu B Result 02/09/2025 Not Detected     RSV PCR 02/09/2025 Not Detected     Coronavirus 2019, PCR 02/09/2025 Not Detected     POCT Glucose 02/09/2025 80    Office Visit on 02/03/2025   Component Date Value    POC LASHAUN-COV-2 AG 02/03/2025 Presumptive negative test for SARS-CoV-2 (no antigen detected)     POC Rapid Influenza A 02/03/2025 Negative     POC Rapid Influenza B 02/03/2025 Negative    Office Visit on  12/06/2024   Component Date Value    POC Color, Urine 12/06/2024 Yellow     POC Appearance, Urine 12/06/2024 Clear     POC Glucose, Urine 12/06/2024 NEGATIVE     POC Bilirubin, Urine 12/06/2024 NEGATIVE     POC Ketones, Urine 12/06/2024 NEGATIVE     POC Specific Gravity, Ur* 12/06/2024 1.020     POC Blood, Urine 12/06/2024 NEGATIVE     POC PH, Urine 12/06/2024 5.5     POC Protein, Urine 12/06/2024 NEGATIVE     POC Urobilinogen, Urine 12/06/2024 0.2     Poc Nitrite, Urine 12/06/2024 NEGATIVE     POC Leukocytes, Urine 12/06/2024 TRACE (A)     Urine Culture 12/06/2024 Growth indicates contamination with mixed bacterial molly. Repeat culture if clinically indicated.    Lab on 11/13/2024   Component Date Value    Color, Urine 11/14/2024 Light-Yellow     Appearance, Urine 11/14/2024 Clear     Specific Gravity, Urine 11/14/2024 1.021     pH, Urine 11/14/2024 5.0     Protein, Urine 11/14/2024 10 (TRACE)     Glucose, Urine 11/14/2024 Normal     Blood, Urine 11/14/2024 NEGATIVE     Ketones, Urine 11/14/2024 NEGATIVE     Bilirubin, Urine 11/14/2024 NEGATIVE     Urobilinogen, Urine 11/14/2024 Normal     Nitrite, Urine 11/14/2024 NEGATIVE     Leukocyte Esterase, Urine 11/14/2024 25 Pao/µL (A)     Extra Tube 11/14/2024 Hold for add-ons.     WBC, Urine 11/14/2024 1-5     RBC, Urine 11/14/2024 1-2     Squamous Epithelial Cell* 11/14/2024 1-9 (SPARSE)     Budding Yeast, Urine 11/14/2024 PRESENT (A)     Mucus, Urine 11/14/2024 FEW     Hyaline Casts, Urine 11/14/2024 3+ (A)     Amorphous Crystals, Urine 11/14/2024 1+     Urine Culture 11/14/2024 Normal genitourinary molly        Assessment/Plan   The primary encounter diagnosis was Tachycardia. A diagnosis of GARCIA (dyspnea on exertion) was also pertinent to this visit.  I personally reviewed her last PCP note, echo, ECGs, and most recent lab work.  Differential diagnoses include sinus tachycardia 2/2 to inhaler use, SVT, Atrial arrhythmias, cardiac ischemia and dysautonomia.  Given her  many different symptoms, we will get a 14 day holter to rule out arrhythmias.   We will also do an ischemic workup, coronary calcifications noted on her latest CT scan.    IZI-collecteisc  14 day holter monitor  Patient can follow up with general cardiology after testing, I will call her if there is any concerning arrhythmias on her monitor.  Patient encouraged to stay hydrated

## 2025-04-09 ASSESSMENT — ENCOUNTER SYMPTOMS
FEVER: 0
WEAKNESS: 0
ABDOMINAL PAIN: 0
VOMITING: 0
DIARRHEA: 0
HEADACHES: 0
SORE THROAT: 0
DOUBLE VISION: 0
FALLS: 0
DIZZINESS: 0
NEAR-SYNCOPE: 0
COUGH: 0
DYSPNEA ON EXERTION: 1
SPUTUM PRODUCTION: 0
LIGHT-HEADEDNESS: 0
ORTHOPNEA: 0
IRREGULAR HEARTBEAT: 1
PALPITATIONS: 0
PND: 0
MYALGIAS: 0
NAUSEA: 0
HEMOPTYSIS: 0
SNORING: 0
BLURRED VISION: 0
SYNCOPE: 0
DIAPHORESIS: 0

## 2025-04-14 LAB
ATRIAL RATE: 112 BPM
P AXIS: 62 DEGREES
P OFFSET: 200 MS
P ONSET: 139 MS
PR INTERVAL: 160 MS
Q ONSET: 219 MS
QRS COUNT: 18 BEATS
QRS DURATION: 88 MS
QT INTERVAL: 326 MS
QTC CALCULATION(BAZETT): 444 MS
QTC FREDERICIA: 401 MS
R AXIS: 86 DEGREES
T AXIS: 57 DEGREES
T OFFSET: 382 MS
VENTRICULAR RATE: 112 BPM

## 2025-04-17 ENCOUNTER — CLINICAL SUPPORT (OUTPATIENT)
Dept: PHYSICAL THERAPY | Facility: CLINIC | Age: 70
End: 2025-04-17
Payer: MEDICARE

## 2025-04-17 VITALS — SYSTOLIC BLOOD PRESSURE: 133 MMHG | OXYGEN SATURATION: 98 % | HEART RATE: 102 BPM | DIASTOLIC BLOOD PRESSURE: 74 MMHG

## 2025-04-17 DIAGNOSIS — R42 VERTIGO: Primary | ICD-10-CM

## 2025-04-17 DIAGNOSIS — H51.11 CONVERGENCE INSUFFICIENCY: ICD-10-CM

## 2025-04-17 DIAGNOSIS — Z74.09 IMPAIRED MOBILITY: ICD-10-CM

## 2025-04-17 DIAGNOSIS — R42 VERTIGO: ICD-10-CM

## 2025-04-17 DIAGNOSIS — R51.9 HEADACHE: ICD-10-CM

## 2025-04-17 PROCEDURE — 97162 PT EVAL MOD COMPLEX 30 MIN: CPT | Mod: GP

## 2025-04-17 PROCEDURE — 97112 NEUROMUSCULAR REEDUCATION: CPT | Mod: GP

## 2025-04-17 RX ORDER — MECLIZINE HYDROCHLORIDE 25 MG/1
25 TABLET ORAL 3 TIMES DAILY PRN
Qty: 30 TABLET | Refills: 1 | Status: SHIPPED | OUTPATIENT
Start: 2025-04-17

## 2025-04-17 ASSESSMENT — PAIN SCALES - GENERAL: PAINLEVEL_OUTOF10: 0 - NO PAIN

## 2025-04-17 ASSESSMENT — ENCOUNTER SYMPTOMS
LOSS OF SENSATION IN FEET: 0
OCCASIONAL FEELINGS OF UNSTEADINESS: 0
DEPRESSION: 0

## 2025-04-17 ASSESSMENT — PAIN - FUNCTIONAL ASSESSMENT: PAIN_FUNCTIONAL_ASSESSMENT: 0-10

## 2025-04-17 NOTE — PROGRESS NOTES
"Physical Therapy    Physical Therapy Evaluation and Treatment    Patient Name: Earline Villagran  MRN: [id number]  Today's Date: 4/17/2025  Time Calculation  Start Time: 0835  Stop Time: 0940  Time Calculation (min): 65 min  PT Evaluation Time Entry  PT Evaluation (Moderate) Time Entry: 50  PT Therapeutic Procedures Time Entry  Neuromuscular Re-Education Time Entry: 15    Visit Number: 1  Insurance: Payor: [facility] MEDICARE / Plan: [employer] MEDICARE / Product Type: *No Product type* /   Plan of Care: 7/16/25  Authorization: Pending  Primary Diagnosis: Vertigo    Assessment:   Earline Villagran  is a 69 y.o. old patient who participated in a physical therapy evaluation today due to chief complaint of dizziness for ~3 months. Patient with a past medical history of HTN, CKD stage 3, DMT2, Concussion in 2023. Patient presents with impaired posture, decreased cervical AROM, headaches/migraines, and impaired occular motor exam significant for convergence insufficiency, saccades, and smooth pursuits which increased her headache and \"heavy head: symptoms. Due to these impairments, she has the following functional limitations and participation restrictions: slight gait deviations, increased fall risk, bed mobility, transfers, performing household/recreational activities such as chores, and performing ADLs such as lower body dressing. Patient barriers to rehab include: history of chronic post-traumatic headaches and migraines. Patient would benefit from skilled physical therapy services to improve above mentioned impairments, allow for independent and safe functional mobility, attain her therapy-related goals, and establish home program. The patient verbalized understanding and is in agreement with all goals and plan of care. Plan of care was developed with input and agreement by the patient.    Plan:   OP PT Plan  Treatment/Interventions: Canalith repositioning, Cryotherapy, Dry needling, Education/ " "Instruction, Gait training, Manual therapy, Neuromuscular re-education, Self care/ home management, Therapeutic activities, Therapeutic exercises  PT Plan: Skilled PT  PT Frequency: 1 time per week  Duration: 12-16 visits  Onset Date: 03/07/25  Certification Period Start Date: 04/17/25  Certification Period End Date: 07/16/25  Number of Treatments Authorized: Pending  Rehab Potential: Fair  Plan of Care Agreement: Patient    Current Problem:  Problem List Items Addressed This Visit    None  Visit Diagnoses         Codes      Vertigo    -  Primary R42      Headache     R51.9      Convergence insufficiency     H51.11      Impaired mobility     Z74.09            Subjective   Earline SHAUN Villagran  is a 69 y.o. female  presenting to the clinic with chief complaint of \"vertigo\" for the past 3 months when she lays down. She states that she got hit in the back of the head with a basketball in 2023 that resulted in a concussion with residual HA's and Migraines (+) dizziness. She reports that she had a \"workers comp\" PT following the concussion that occurred on the job. She reports having \"room spinning dizziness\" when she lays down on back, R side lying, and bending over. She feeling of lightheadedness and imbalance when she has her \"dizziness\" episodes. She reports taking Mclazine daily for ~4 months and took it this morning. She takes migraine medication daily.     -Dizziness Scale: 6/10 current; 6/10 worst; 4/10 best  -Type of dizziness: Room spinning, lightheaded, Imbalance  -How long does the dizziness last:  a few seconds as she changes positions to decrease dizziness  -What causes the dizziness: rolling in bed, R side lying, supine, and bending down   -Falls: 0  -Hydration status: not enough  -Sleep: 9-10 hrs / night    PRIOR LEVEL OF FUNCTION:  Independent Living, Independent ADLs/IADLs, Independent Ambulation, (+) Driving      SOCIAL HISTORY/LIVING ARRANGEMENT:   Patient lives with  in a 2-story home with 4 VANDA " "+ HR  Patient with 20 steps with HR to bedroom/bathroom    ACTIVITY/PARTICIPATION LIMITATIONS AND RESTRICTIONS: household chores, lower body dressing, looking down,     EXERCISE/ACTIVITY LEVEL:  sedentary    PATIENT BARRIERS TO REHAB: (+) Concussion in 2023 with residual HA/Migraines     PATIENT GOAL: \"improve dizziness\"     General:  General  Reason for Referral: PT eval and treat  Referred By: Pepper Webb MD  Past Medical History Relevant to Rehab: HTN, CKD stage 3, DMT2  Preferred Learning Style: verbal, visual, written  Payor: [employer] [facility] [employer]ICARE / Plan: [employer] MEDICARE / Product Type: *No Product type* /   Pepper Webb    Precautions  Precautions  STEADI Fall Risk Score (The score of 4 or more indicates an increased risk of falling): 2  Precautions Comment: Mild Fall Risk  Heart Rate: 102  SpO2: 98 %  BP: 133/74  BP Location: Right arm  BP Method: Automatic  Patient Position: Sitting    Pain  Pain Assessment: 0-10  0-10 (Numeric) Pain Score: 0 - No pain    Vital Signs:  Vital Signs  Heart Rate: 102  SpO2: 98 %  BP: 133/74  BP Location: Right arm  BP Method: Automatic  Patient Position: Sitting     Objective     GROSS POSTURE:  stand/sitting:  rounded shoulders, forward head, R rotated head  TRANSFERS SIT <> STAND:  Modified Independent   GAIT:  Patient ambulated 60 feet at [facility]. Patient demonstrated fair margaret, fair step length, fair step height, normal TE      CERVICAL ROM:    AROM   EXTENSION Min limitation   FLEXION Min limitation   RIGHT ROTATION Min limitation    LEFT ROTATION Min limitation    RIGHT LATERAL FLEXION Mod limitation    LEFT LATERAL FLEXION Mod limitation      CERVICAL STRENGTH:   EXTENSION 4   FLEXION 4   RIGHT ROTATION 4   LEFT ROTATION 4   RIGHT LATERAL FLEXION 4   LEFT LATERAL FLEXION 4       VERTEBRAL ARTERY SUBJECTIVE REPORTING FOR THE FOLLOWING:   NEGATIVE POSITIVE   DYPLOPIA X Floater with migraines   DYSARTHRIA X    DYSPHAGIA X Dry mouth   NYSTAGMUS X    DROP " "ATTACK X    NAUSEA X    FACIAL NUMBNESS X    DIZZINESS  x     OCULAR MOTOR EXAM:   NORMAL ABNORMAL   RANGE OF MOTION X    SMOOTH PURSUIT   Saccadic L eye movements, eye squinting L>R, L eye end-range nystagmus with L lateral gaze   HORIZONTAL SACCADES  Undershooting with correction L>R, path deviation, hypometric   VERTICAL SACCADES  Saccadic, hypometric, path deviation L eye to midline   EYE ALIGNMENT  L eye skewed lateral with R head rotation   COVER  L eye positive with R eye covered   UNCOVER  L eye positive with lateral deviation   CONVERGENCE  Abnormal length, decreased visual acuity requiring new glasses                  *Increase in headache and \"heavy headed\" symptoms with testing*    POSITIONAL TESTING: Deferred due to patient having taken Meclizine this morning. Advised to discontinue Meclizine use 2 days prior to next PT session to allow for accurate assessment.    Outcome Measures:  Other Measures  Dizziness Handicap Inventory: 30  Other Outcome Measures: PCSS: 72    Treatments:  NEUROMUSCULAR REEDUCATION x 15 minutes  - Seated Horizontal Smooth Pursuit - Monocular x 10 R/L  - Seated Vertical Smooth Pursuit - Monocular x 10 R/L  - Seated Horizontal Saccades - Monocular x 10 R/L  - Seated Vertical Saccades - Monocular x 10 R/L  Educated patient on purpose and benefits of neurologic physical therapy for diagnosis along with results of examination findings and how this correlates to their chief complaint  Educated patient on purpose of exercises and importance of regular daily home program compliance  Educated patient on importance of nutrition/hydration and the recommended daily amount of fluid being half of one's body weight in ounces  Educated patient on vestibular system and various causes of dizziness/vertigo  Educated patient on Theraspecs for Migraine as she reports light sensitivity  Provided patient with visual, verbal, and written instruction via printed home program handout to ensure " carryover    EDUCATION: see treatment section above for details. Patient verbalized and expressed good understanding of all information.        HOME EXERCISE PROGRAM:  Access Code: [email or web address]  URL: [email or web address]  Date: 04/17/2025  Prepared by: Keaton Fajardo    Program Notes  DO RIGHT EYE AND THEN LEFT EYE. ONE EYE AT A TIME.    Exercises  - Seated Horizontal Smooth Pursuit  - 1 x daily - 7 x weekly - 1 sets - 10 reps  - Seated Vertical Smooth Pursuit  - 1 x daily - 7 x weekly - 1 sets - 10 reps  - Seated Horizontal Saccades  - 1 x daily - 7 x weekly - 1 sets - 10 reps  - Seated Vertical Saccades  - 1 x daily - 7 x weekly - 1 sets - 10 reps  - Seated Upper Trapezius Stretch  - 1 x daily - 7 x weekly - 3 sets - 30 sec  hold  - Seated Levator Scapulae Stretch  - 1 x daily - 7 x weekly - 3 sets - 30 sec hold      Goals:  Active       PT Problem       Patient will report 2/10 dizziness at lowest and 4/10 dizziness at worst to demonstrate improvement in dizziness symptoms and allow for return to PLOF       Start:  04/17/25    Expected End:  07/16/25            Patient will improve DHI score by 10 pts to demonstrate improvement in dizziness symptoms and functional mobility       Start:  04/17/25    Expected End:  07/16/25            Patient will improve cervical side-bend AROM bilaterally to min limitation and cervical rotation to nil limitation to allow for unlimited ability to perform driving, chores, and reading       Start:  04/17/25    Expected End:  07/16/25            Patient will improve Post Concussive Symptom Severity Scale (PCSS) to 50 points to demonstrate decreased concussive symptoms allow for return to PLOF        Start:  04/17/25    Expected End:  07/16/25            Patient will be Independent with home exercise program to demonstrate compliance and self-management       Start:  04/17/25    Expected End:  07/16/25            Patient Stated Goal - improve dizziness       Start:   04/17/25    Expected End:  07/16/25

## 2025-04-21 NOTE — PROGRESS NOTES
Assessment/Plan   Diagnoses and all orders for this visit:  Fuchs' corneal dystrophy of both eyes  Combined form of age-related cataract, right eye  Combined form of age-related cataract, left eye  Patient advised that the cells on the back surface of the cornea function as little pumps to regulate the amount of fluid in the cornea.  If too many of these cells are lost, the cornea takes on excess fluid and becomes cloudy, making the vision blurry.  Mild cases may be treated with saline drops to help draw out the excess fluid.  Severe cases require a corneal transplant.    Discussed with pt higher likelihood of needing EK after CE than the general population due FECD. Also, explained delayed vision recovery due to early k edema. The patient understands.   The nature of cataract was discussed with the patient as well as the elective nature of surgery.  The patient was reassured that surgery at a later date does not put the patient at risk for a worse outcome.  It was emphasized that the need for surgery is dictated by the patient`s quality of life as influenced by the cataract.  All the patient`s questions were answered.   Discussed with pt DMEK triple (OD first) as a measure to improve  vision OD  Discussed r/b/a of sx inculding but not limited to supine position and need for rebubbling  The pt understands and wishes to proceed

## 2025-04-22 ENCOUNTER — APPOINTMENT (OUTPATIENT)
Dept: OPHTHALMOLOGY | Facility: CLINIC | Age: 70
End: 2025-04-22
Payer: MEDICARE

## 2025-04-22 DIAGNOSIS — H25.812 COMBINED FORM OF AGE-RELATED CATARACT, LEFT EYE: ICD-10-CM

## 2025-04-22 DIAGNOSIS — H18.513 FUCHS' CORNEAL DYSTROPHY OF BOTH EYES: ICD-10-CM

## 2025-04-22 DIAGNOSIS — E11.9 TYPE 2 DIABETES MELLITUS WITHOUT COMPLICATION, UNSPECIFIED WHETHER LONG TERM INSULIN USE: ICD-10-CM

## 2025-04-22 DIAGNOSIS — H25.811 COMBINED FORM OF AGE-RELATED CATARACT, RIGHT EYE: Primary | ICD-10-CM

## 2025-04-22 PROCEDURE — 92136 OPHTHALMIC BIOMETRY: CPT | Mod: BILATERAL PROCEDURE | Performed by: OPHTHALMOLOGY

## 2025-04-22 PROCEDURE — 99214 OFFICE O/P EST MOD 30 MIN: CPT | Performed by: OPHTHALMOLOGY

## 2025-04-22 PROCEDURE — 92136 OPHTHALMIC BIOMETRY: CPT | Performed by: OPHTHALMOLOGY

## 2025-04-22 RX ORDER — CYCLOPENTOLATE HYDROCHLORIDE 10 MG/ML
1 SOLUTION/ DROPS OPHTHALMIC ONCE
OUTPATIENT
Start: 2025-04-22

## 2025-04-22 RX ORDER — TROPICAMIDE 10 MG/ML
1 SOLUTION/ DROPS OPHTHALMIC ONCE
OUTPATIENT
Start: 2025-04-22 | End: 2025-04-22

## 2025-04-22 RX ORDER — TOBRAMYCIN 3 MG/ML
1 SOLUTION/ DROPS OPHTHALMIC
OUTPATIENT
Start: 2025-04-22 | End: 2025-04-22

## 2025-04-22 ASSESSMENT — CONF VISUAL FIELD
OD_NORMAL: 1
OD_SUPERIOR_TEMPORAL_RESTRICTION: 0
OS_SUPERIOR_TEMPORAL_RESTRICTION: 0
OS_SUPERIOR_NASAL_RESTRICTION: 0
OD_INFERIOR_TEMPORAL_RESTRICTION: 0
OS_INFERIOR_NASAL_RESTRICTION: 0
OD_SUPERIOR_NASAL_RESTRICTION: 0
OS_INFERIOR_TEMPORAL_RESTRICTION: 0
OS_NORMAL: 1
OD_INFERIOR_NASAL_RESTRICTION: 0

## 2025-04-22 ASSESSMENT — ENCOUNTER SYMPTOMS: EYES NEGATIVE: 1

## 2025-04-22 ASSESSMENT — EXTERNAL EXAM - LEFT EYE: OS_EXAM: NORMAL

## 2025-04-22 ASSESSMENT — VISUAL ACUITY
METHOD: ETDRS
OD_SC: 20/50-1
OS_SC: 20/50

## 2025-04-22 ASSESSMENT — SLIT LAMP EXAM - LIDS
COMMENTS: NORMAL
COMMENTS: NORMAL

## 2025-04-22 ASSESSMENT — EXTERNAL EXAM - RIGHT EYE: OD_EXAM: NORMAL

## 2025-04-22 ASSESSMENT — TONOMETRY
OS_IOP_MMHG: 19
OD_IOP_MMHG: 22
IOP_METHOD: TONOPEN

## 2025-04-25 ENCOUNTER — HOSPITAL ENCOUNTER (OUTPATIENT)
Dept: RADIOLOGY | Facility: CLINIC | Age: 70
Discharge: HOME | End: 2025-04-25
Payer: MEDICARE

## 2025-04-25 ENCOUNTER — HOSPITAL ENCOUNTER (OUTPATIENT)
Dept: CARDIOLOGY | Facility: CLINIC | Age: 70
Discharge: HOME | End: 2025-04-25
Payer: MEDICARE

## 2025-04-25 ENCOUNTER — TELEPHONE (OUTPATIENT)
Dept: NEUROLOGY | Facility: HOSPITAL | Age: 70
End: 2025-04-25
Payer: MEDICARE

## 2025-04-25 DIAGNOSIS — R06.09 DOE (DYSPNEA ON EXERTION): ICD-10-CM

## 2025-04-25 PROCEDURE — 93018 CV STRESS TEST I&R ONLY: CPT | Performed by: INTERNAL MEDICINE

## 2025-04-25 PROCEDURE — 3430000001 HC RX 343 DIAGNOSTIC RADIOPHARMACEUTICALS: Performed by: NURSE PRACTITIONER

## 2025-04-25 PROCEDURE — 93016 CV STRESS TEST SUPVJ ONLY: CPT | Performed by: INTERNAL MEDICINE

## 2025-04-25 PROCEDURE — 93017 CV STRESS TEST TRACING ONLY: CPT

## 2025-04-25 PROCEDURE — A9502 TC99M TETROFOSMIN: HCPCS | Performed by: NURSE PRACTITIONER

## 2025-04-25 PROCEDURE — 78452 HT MUSCLE IMAGE SPECT MULT: CPT

## 2025-04-25 PROCEDURE — 2500000004 HC RX 250 GENERAL PHARMACY W/ HCPCS (ALT 636 FOR OP/ED): Performed by: NURSE PRACTITIONER

## 2025-04-25 RX ORDER — REGADENOSON 0.08 MG/ML
0.4 INJECTION, SOLUTION INTRAVENOUS
Status: COMPLETED | OUTPATIENT
Start: 2025-04-25 | End: 2025-04-25

## 2025-04-25 RX ADMIN — REGADENOSON 0.4 MG: 0.08 INJECTION, SOLUTION INTRAVENOUS at 09:26

## 2025-04-25 RX ADMIN — TETROFOSMIN 30 MILLICURIE: 0.23 INJECTION, POWDER, LYOPHILIZED, FOR SOLUTION INTRAVENOUS at 09:49

## 2025-04-25 RX ADMIN — TETROFOSMIN 10.3 MILLICURIE: 0.23 INJECTION, POWDER, LYOPHILIZED, FOR SOLUTION INTRAVENOUS at 08:00

## 2025-04-26 DIAGNOSIS — K21.9 GASTROESOPHAGEAL REFLUX DISEASE WITHOUT ESOPHAGITIS: ICD-10-CM

## 2025-04-26 DIAGNOSIS — Z79.4 TYPE 2 DIABETES MELLITUS WITH HYPERGLYCEMIA, WITH LONG-TERM CURRENT USE OF INSULIN: ICD-10-CM

## 2025-04-26 DIAGNOSIS — E11.65 TYPE 2 DIABETES MELLITUS WITH HYPERGLYCEMIA, WITH LONG-TERM CURRENT USE OF INSULIN: ICD-10-CM

## 2025-04-26 DIAGNOSIS — I10 BENIGN ESSENTIAL HYPERTENSION: ICD-10-CM

## 2025-04-26 DIAGNOSIS — J44.9 CHRONIC OBSTRUCTIVE PULMONARY DISEASE, UNSPECIFIED COPD TYPE (MULTI): ICD-10-CM

## 2025-04-26 DIAGNOSIS — E03.9 HYPOTHYROIDISM, UNSPECIFIED TYPE: ICD-10-CM

## 2025-04-26 RX ORDER — HYDROCHLOROTHIAZIDE 25 MG/1
25 TABLET ORAL DAILY
Qty: 90 TABLET | Refills: 1 | Status: SHIPPED | OUTPATIENT
Start: 2025-04-26

## 2025-04-26 RX ORDER — GLIMEPIRIDE 4 MG/1
4 TABLET ORAL
Qty: 180 TABLET | Refills: 1 | Status: SHIPPED | OUTPATIENT
Start: 2025-04-26

## 2025-04-26 RX ORDER — OMEPRAZOLE 20 MG/1
20 CAPSULE, DELAYED RELEASE ORAL DAILY
Qty: 90 CAPSULE | Refills: 1 | Status: SHIPPED | OUTPATIENT
Start: 2025-04-26

## 2025-04-26 RX ORDER — LEVOTHYROXINE SODIUM 125 UG/1
125 TABLET ORAL DAILY
Qty: 90 TABLET | Refills: 0 | Status: SHIPPED | OUTPATIENT
Start: 2025-04-26

## 2025-04-28 RX ORDER — FLUTICASONE FUROATE, UMECLIDINIUM BROMIDE AND VILANTEROL TRIFENATATE 100; 62.5; 25 UG/1; UG/1; UG/1
1 POWDER RESPIRATORY (INHALATION) DAILY
Qty: 28 EACH | Refills: 2 | Status: SHIPPED | OUTPATIENT
Start: 2025-04-28

## 2025-04-28 NOTE — PROGRESS NOTES
Clinical Pharmacy Appointment    Patient ID: Earline Villagran is a 69 y.o. female who presents for Diabetes.    Pt is here for Follow Up appointment.     Referring Provider: Patricia Calderon MD  PCP: Patricia Calderon MD  Last visit with PCP: 3/13/2025   Next visit with PCP: 2025    Subjective   Drug Interactions  No relevant drug interactions were noted.    Medication System Management  Patient's preferred pharmacy: [id number] in [place]  Adherence/Organization: No concerns at this time  Affordability/Accessibility: No concerns at this time    HPI  DIABETES MELLITUS TYPE 2:    Diagnosed with diabetes:  20 years ago.   Known diabetic complications: hypeglycemia.  Does patient follow with Endocrinology: Yes  Last optometry exam: 3/19/2025    Current diabetic medications include:  Mounjaro 15 mg; inject 15 mg once weekly on   Glimepiride 4 mg; take 1 tablet BID    Clarifications to above regimen: None  Adverse Effects: None    Past diabetic medications include:  Trulicity, Ozempic, Januvia, Glipizide, Metformin    Glucose Readings:  Glucometer/CGM Type: OneTouch Ultra Glucometer  Patient tests BG 2 times per day    Current home BG readings (mg/dL): 100 mg/dL; 128 mg/dL; 94 mg/dL; 109 mg/dL; 86 mg/dL;115 mg/dL; 179 mg/dL; 143 mg/dL; 79 mg/dL (Ave: 115 mg/dL)  Previous home BG readings (mg/dL): FBs mg/dL; PPBG: highest around 200 mg/dL    Any episodes of hypoglycemia? No, lowest in the upper 70s .  Did patient treat episode of hypoglycemia appropriately? N/A  Does the patient have a prescription for ready-to-use Glucagon? Not on insulin    Lifestyle:  Diet: 2 meals/day.   BK: Cereal, banana  LN: Salad, meat, shrimp  Drinks: Water, sparkling water  Exercise: patient wants to start walking more  Tobacco history: Former smoker    Secondary Prevention:  Statin? Yes  ACE-I/ARB? No  Aspirin? Yes    Pertinent PMH Review:  PMH of Pancreatitis: No  PMH of Retinopathy: No  PMH of Urinary  Tract Infections: No  PMH of MTC: No  PMH of MEN2: No  UACR/EGFR in last year?: No  Albumin/Creatine Ratio   Date Value Ref Range Status   06/04/2022 17.5 0.0 - 30.0 ug/mg crt Final     Immunizations:  Influenza? Yes  COVID? Yes  Pneumonia? Most recent is Prevnar 13 from 8/24/2021    Objective   Allergies[1]  Social History     Social History Narrative    Not on file      Medication Review  Current Outpatient Medications   Medication Instructions    albuterol 90 mcg/actuation inhaler 2 puffs, inhalation, Every 6 hours PRN    amLODIPine (NORVASC) 5 mg, oral, Daily    atorvastatin (LIPITOR) 40 mg, oral, Daily    blood sugar diagnostic (OneTouch Ultra Test) strip TEST GLUCOSE 2 TO 3 TIMES DAILY    carvedilol (COREG) 3.125 mg, oral, 2 times daily    estradiol (Estrace) 0.01 % (0.1 mg/gram) vaginal cream Apply 0.5g (blueberry size amount) nightly for 2 weeks, then 2 - 3 times per week    fluticasone-umeclidin-vilanter (Trelegy Ellipta) 100-62.5-25 mcg blister with device 1 puff, inhalation, Daily    gabapentin (NEURONTIN) 100 mg, oral, 3 times daily    glimepiride (AMARYL) 4 mg, oral    hydroCHLOROthiazide (HYDRODIURIL) 25 mg, oral, Daily    hydrOXYzine HCL (Atarax) 10 mg tablet TAKE 1 TABLET BY MOUTH EVERY 8  HOURS IF NEEDED FOR ITCHING    levothyroxine (SYNTHROID, LEVOXYL) 125 mcg, oral, Daily    meclizine (ANTIVERT) 25 mg, oral, 3 times daily PRN    Mounjaro 15 mg, subcutaneous, Weekly    nortriptyline (PAMELOR) 25 mg, oral, 2 times daily    omeprazole (PRILOSEC) 20 mg, oral, Daily    ondansetron ODT (ZOFRAN-ODT) 4 mg, oral, Every 8 hours PRN    OneTouch Delica Plus Lancet 33 gauge misc USE WITH BLOOD GLUCOSE TEST ONCE DAILY    OneTouch Ultra2 Meter misc 1 DEVICE ONCE DAILY. TEST ONE TIME A DAY. INSULIN DEP? NO E11.9 DM 2    Ubrelvy 50 mg, oral, Daily PRN      Vitals  BP Readings from Last 2 Encounters:   04/17/25 133/74   04/04/25 113/76     BMI Readings from Last 1 Encounters:   04/04/25 36.84 kg/m²       Labs  A1C  Lab Results   Component Value Date    HGBA1C 11.8 (A) 09/26/2024    HGBA1C 10.6 (H) 05/19/2024    HGBA1C 8.8 (H) 12/29/2023     BMP  Lab Results   Component Value Date    CALCIUM 9.1 03/31/2025     (L) 03/31/2025    K 4.0 03/31/2025    CO2 25 03/31/2025    CL 98 03/31/2025    BUN 19 03/31/2025    CREATININE 1.57 (H) 03/31/2025    EGFR 35 (L) 03/31/2025     LFTs  Lab Results   Component Value Date    ALT 14 03/31/2025    AST 14 03/31/2025    ALKPHOS 74 03/31/2025    BILITOT 0.4 03/31/2025     FLP  Lab Results   Component Value Date    TRIG 161 (H) 12/29/2023    CHOL 222 (H) 12/29/2023    LDLF 198 (H) 08/09/2023    LDLCALC 140 (H) 12/29/2023    HDL 50.3 12/29/2023     Urine Microalbumin  Lab Results   Component Value Date    MICROALBCREA 17.5 06/04/2022     Weight Management  Wt Readings from Last 3 Encounters:   04/04/25 104 kg (228 lb 4 oz)   04/01/25 103 kg (228 lb)   03/31/25 104 kg (230 lb)      There is no height or weight on file to calculate BMI.     Assessment/Plan   Problem List Items Addressed This Visit       Type 2 diabetes mellitus with hyperglycemia, with long-term current use of insulin    Patient's goal A1c is < 7%.  Is pt at goal? No, patient's most recent A1c from 9/26/2024 was 11.8%  Patient's SMBGs are at goal, patient's average blood sugar was 115 mg/dL.     Rationale for plan: Patient is tolerating the medication well at this time, will continue her current regimen.    Medication Changes:  CONTINUE  Mounjaro 15 mg; inject 15 mg once weekly on Saturdays  Glimepiride 4 mg; take 1 tablet BID    Future Considerations:  Patient is due for an updated A1c  If patient starts to have low blood sugars, can decrease glimepiride    Monitoring and Education:  Counseled patient on her current medications and the goals of treatment  Answered all patient questions and concerns         Relevant Orders    Referral to Clinical Pharmacy       Clinical Pharmacist follow-up: 6/10/2025, Telehealth  "visit    Patient is not followed in CCM. (If yes, track minutes under compass emma at each visit)    Continue all meds under the continuation of care with the referring provider and clinical pharmacy team.    Thank you,  Theresa Castañeda, PharmD  Clinical Pharmacist - [facility]  [phone]  4/29/2025    Verbal consent to manage patient's drug therapy was obtained from the patient. They were informed they may decline to participate or withdraw from participation in pharmacy services at any time.         [1]   Allergies  Allergen Reactions    Raspberry Shortness of breath    Ciprofloxacin Rash     Itching, Itching    Clindamycin Diarrhea     Other Reaction(s): GI Upset      diarrhea, diarrhea    Green Tea Swelling     Lip swells   W green tea and neto, Lip swells   W green tea and neto    Penicillins Syncope     Other Reaction(s): Other (See Comments), Syncope, Unknown      Other reaction(s): Other (See Comments) PASSED OUT, PASSED OUT      PASSED OUT, PASSED OUT      \"Passed out\"    Sulfamethoxazole-Trimethoprim Hives, Unknown and Rash     Other Reaction(s): Unknown    Phenylephrine-Guaifenesin Palpitations     Other Reaction(s): Other: See Comments      Other reaction(s): Other: See Comments Fast heart rate      Fast heart rate     "

## 2025-04-29 ENCOUNTER — APPOINTMENT (OUTPATIENT)
Dept: PHARMACY | Facility: HOSPITAL | Age: 70
End: 2025-04-29
Payer: MEDICARE

## 2025-04-29 DIAGNOSIS — Z79.4 TYPE 2 DIABETES MELLITUS WITH HYPERGLYCEMIA, WITH LONG-TERM CURRENT USE OF INSULIN: ICD-10-CM

## 2025-04-29 DIAGNOSIS — E11.65 TYPE 2 DIABETES MELLITUS WITH HYPERGLYCEMIA, WITH LONG-TERM CURRENT USE OF INSULIN: ICD-10-CM

## 2025-04-29 NOTE — ASSESSMENT & PLAN NOTE
Patient's goal A1c is < 7%.  Is pt at goal? No, patient's most recent A1c from 9/26/2024 was 11.8%  Patient's SMBGs are at goal, patient's average blood sugar was 115 mg/dL.     Rationale for plan: Patient is tolerating the medication well at this time, will continue her current regimen.    Medication Changes:  CONTINUE  Mounjaro 15 mg; inject 15 mg once weekly on Saturdays  Glimepiride 4 mg; take 1 tablet BID    Future Considerations:  Patient is due for an updated A1c  If patient starts to have low blood sugars, can decrease glimepiride    Monitoring and Education:  Counseled patient on her current medications and the goals of treatment  Answered all patient questions and concerns

## 2025-04-30 NOTE — PROGRESS NOTES
Physical Therapy    Physical Therapy Treatment    Patient Name: Earline Villagran  MRN: [id number]  Today's Date: 5/1/2025  Time Calculation  Start Time: 1304  Stop Time: 1350  Time Calculation (min): 46 min     PT Therapeutic Procedures Time Entry  Therapeutic Activity Time Entry: 30  PT Modalities Time Entry  Canalith Repositioning Time Entry: 15    Visit number: 2  Insurance: Payor: [employer] MEDICARE / Plan: [employer] MEDICARE / Product Type: *No Product type* /   Plan of Care: 7/16/25  Authorization: 16 Visits  04/17/2025-07/10/2025  Primary Diagnosis: Vertigo      Assessment:  Patient presents for first follow-up PT session this date. Patient responded well to diaphragmatic breathing with combination of 5 finger breathing as well as grounding techniques to assist with management of dizziness and emotional overlay. She required max encouragement and emotional support to perform positional testing and canalith repositioning as she was with a tendency to come right up once taken down into supine for testing. Following education at length and emotional support, she allowed for the test and Geufoni maneuver to be performed and benefited from words of encouragement throughout each step. She was also with increased dizziness a few min after the Geufoni maneuver and needed grounding techniques to calm down symptoms. Patient would continue to benefit from skilled PT to continue to improve strength, balance, gait, and overall functional mobility.    Plan: Continue per plan of care to improve functional strength and balance.        Current Problem  Problem List Items Addressed This Visit    None  Visit Diagnoses         Codes      Vertigo    -  Primary R42      Headache     R51.9      Convergence insufficiency     H51.11      Impaired mobility     Z74.09            Subjective    Patient states that she took her Meclizine today this morning. Dizziness currently 1/10 and at worst today it was 9/10 room  spinning dizziness. She states that she will have eye surgery in July. She states that she has been inconsistent with her HEP.     Precautions:   Precautions  STEADI Fall Risk Score (The score of 4 or more indicates an increased risk of falling): 2  Precautions Comment: Mild Fall Risk    Pain  Pain Assessment  Pain Assessment: 0-10  0-10 (Numeric) Pain Score: 0 - No pain    Objective     POSITIONAL TESTING:  POSITION NYSTAGMUS DIRECTION DIZZINESS DURATION (SECONDS)   HEAD CENTER R beat 10/10    ROLL RIGHT Not Tested d/t emotional overlay     ROLL LEFT GEOTROPIC 2/10        Treatments:  THERAPEUTIC ACTIVITY x 30 minutes  Diaphragmatic Breathing with 5 finger breathing x 10 minutes  Grounding in sitting x 10 minutes  Educated patient at length regarding BPPV and the testing/treatments needed to be performed in order to properly treat patient symptoms as she was with emotional overlay  Positional testing as noted above in objective section    Canalith Repositioning x 15 minutes  Geufone for R canal x 1  *Pt required max encouragement and emotional support due to her emotional overlay response to positional testing and canalith repositioning treatment      OP EDUCATION: Instructed patient via verbal cues, tactile cues, and demonstration for proper form and technique of exercises.  Educated patient regarding purpose of all activities.     HOME EXERCISE PROGRAM:  Access Code: [email or web address]  URL: [email or web address]  Date: 04/17/2025  Prepared by: Keaton Fajardo    Program Notes  DO RIGHT EYE AND THEN LEFT EYE. ONE EYE AT A TIME.    Exercises  - Seated Horizontal Smooth Pursuit  - 1 x daily - 7 x weekly - 1 sets - 10 reps  - Seated Vertical Smooth Pursuit  - 1 x daily - 7 x weekly - 1 sets - 10 reps  - Seated Horizontal Saccades  - 1 x daily - 7 x weekly - 1 sets - 10 reps  - Seated Vertical Saccades  - 1 x daily - 7 x weekly - 1 sets - 10 reps  - Seated Upper Trapezius Stretch  - 1 x daily - 7 x weekly  - 3 sets - 30 sec  hold  - Seated Levator Scapulae Stretch  - 1 x daily - 7 x weekly - 3 sets - 30 sec hold    Goals:  Active       PT Problem       Patient will report 2/10 dizziness at lowest and 4/10 dizziness at worst to demonstrate improvement in dizziness symptoms and allow for return to PLOF       Start:  04/17/25    Expected End:  07/16/25            Patient will improve DHI score by 10 pts to demonstrate improvement in dizziness symptoms and functional mobility       Start:  04/17/25    Expected End:  07/16/25            Patient will improve cervical side-bend AROM bilaterally to min limitation and cervical rotation to nil limitation to allow for unlimited ability to perform driving, chores, and reading       Start:  04/17/25    Expected End:  07/16/25            Patient will improve Post Concussive Symptom Severity Scale (PCSS) to 50 points to demonstrate decreased concussive symptoms allow for return to PLOF        Start:  04/17/25    Expected End:  07/16/25            Patient will be Independent with home exercise program to demonstrate compliance and self-management       Start:  04/17/25    Expected End:  07/16/25            Patient Stated Goal - improve dizziness       Start:  04/17/25    Expected End:  07/16/25

## 2025-05-01 ENCOUNTER — APPOINTMENT (OUTPATIENT)
Dept: CARDIOLOGY | Facility: HOSPITAL | Age: 70
End: 2025-05-01
Payer: MEDICARE

## 2025-05-01 ENCOUNTER — TREATMENT (OUTPATIENT)
Dept: PHYSICAL THERAPY | Facility: CLINIC | Age: 70
End: 2025-05-01
Payer: MEDICARE

## 2025-05-01 DIAGNOSIS — R42 VERTIGO: Primary | ICD-10-CM

## 2025-05-01 DIAGNOSIS — H51.11 CONVERGENCE INSUFFICIENCY: ICD-10-CM

## 2025-05-01 DIAGNOSIS — R51.9 HEADACHE: ICD-10-CM

## 2025-05-01 DIAGNOSIS — Z74.09 IMPAIRED MOBILITY: ICD-10-CM

## 2025-05-01 LAB — BODY SURFACE AREA: 2.2 M2

## 2025-05-01 PROCEDURE — 95992 CANALITH REPOSITIONING PROC: CPT | Mod: GP

## 2025-05-01 PROCEDURE — 97530 THERAPEUTIC ACTIVITIES: CPT | Mod: GP

## 2025-05-01 ASSESSMENT — PAIN SCALES - GENERAL: PAINLEVEL_OUTOF10: 0 - NO PAIN

## 2025-05-01 ASSESSMENT — PAIN - FUNCTIONAL ASSESSMENT: PAIN_FUNCTIONAL_ASSESSMENT: 0-10

## 2025-05-05 ENCOUNTER — APPOINTMENT (OUTPATIENT)
Dept: PHYSICAL THERAPY | Facility: CLINIC | Age: 70
End: 2025-05-05
Payer: MEDICARE

## 2025-05-06 RX ORDER — FLUTICASONE PROPIONATE 50 MCG
2 SPRAY, SUSPENSION (ML) NASAL DAILY
COMMUNITY
Start: 2025-04-05

## 2025-05-07 ENCOUNTER — OFFICE VISIT (OUTPATIENT)
Dept: CARDIOLOGY | Facility: CLINIC | Age: 70
End: 2025-05-07
Payer: MEDICARE

## 2025-05-07 VITALS
WEIGHT: 230 LBS | BODY MASS INDEX: 36.96 KG/M2 | SYSTOLIC BLOOD PRESSURE: 127 MMHG | HEART RATE: 116 BPM | OXYGEN SATURATION: 97 % | DIASTOLIC BLOOD PRESSURE: 81 MMHG | HEIGHT: 66 IN

## 2025-05-07 DIAGNOSIS — E11.9 DIABETES MELLITUS TYPE II, NON INSULIN DEPENDENT (MULTI): ICD-10-CM

## 2025-05-07 DIAGNOSIS — R06.09 DOE (DYSPNEA ON EXERTION): Primary | ICD-10-CM

## 2025-05-07 DIAGNOSIS — R00.0 TACHYCARDIA: ICD-10-CM

## 2025-05-07 DIAGNOSIS — I73.9 CLAUDICATION OF BOTH LOWER EXTREMITIES: ICD-10-CM

## 2025-05-07 PROCEDURE — 3008F BODY MASS INDEX DOCD: CPT | Performed by: INTERNAL MEDICINE

## 2025-05-07 PROCEDURE — 1126F AMNT PAIN NOTED NONE PRSNT: CPT | Performed by: INTERNAL MEDICINE

## 2025-05-07 PROCEDURE — G2211 COMPLEX E/M VISIT ADD ON: HCPCS | Performed by: INTERNAL MEDICINE

## 2025-05-07 PROCEDURE — 3079F DIAST BP 80-89 MM HG: CPT | Performed by: INTERNAL MEDICINE

## 2025-05-07 PROCEDURE — 3074F SYST BP LT 130 MM HG: CPT | Performed by: INTERNAL MEDICINE

## 2025-05-07 PROCEDURE — 1036F TOBACCO NON-USER: CPT | Performed by: INTERNAL MEDICINE

## 2025-05-07 PROCEDURE — 99215 OFFICE O/P EST HI 40 MIN: CPT | Performed by: INTERNAL MEDICINE

## 2025-05-07 PROCEDURE — 1159F MED LIST DOCD IN RCRD: CPT | Performed by: INTERNAL MEDICINE

## 2025-05-07 PROCEDURE — 99212 OFFICE O/P EST SF 10 MIN: CPT

## 2025-05-07 PROCEDURE — 1160F RVW MEDS BY RX/DR IN RCRD: CPT | Performed by: INTERNAL MEDICINE

## 2025-05-07 ASSESSMENT — ENCOUNTER SYMPTOMS
OCCASIONAL FEELINGS OF UNSTEADINESS: 0
DEPRESSION: 0
LOSS OF SENSATION IN FEET: 0

## 2025-05-07 ASSESSMENT — LIFESTYLE VARIABLES
HOW OFTEN DO YOU HAVE SIX OR MORE DRINKS ON ONE OCCASION: NEVER
HOW OFTEN DO YOU HAVE A DRINK CONTAINING ALCOHOL: NEVER
HOW MANY STANDARD DRINKS CONTAINING ALCOHOL DO YOU HAVE ON A TYPICAL DAY: PATIENT DOES NOT DRINK
SKIP TO QUESTIONS 9-10: 1
AUDIT-C TOTAL SCORE: 0

## 2025-05-07 ASSESSMENT — PAIN SCALES - GENERAL: PAINLEVEL_OUTOF10: 0-NO PAIN

## 2025-05-07 NOTE — PROGRESS NOTES
Physical Therapy    Physical Therapy Treatment    Patient Name: Earline Villagran  MRN: [id number]  Today's Date: 5/8/2025  Time Calculation  Start Time: 0905  Stop Time: 0945  Time Calculation (min): 40 min     PT Therapeutic Procedures Time Entry  Manual Therapy Time Entry: 15  Therapeutic Activity Time Entry: 10  PT Modalities Time Entry  Canalith Repositioning Time Entry: 15    Visit number: 3  Insurance: Payor: [facility] MEDICARE / Plan: [employer] MEDICARE / Product Type: *No Product type* /   Plan of Care: 7/16/25  Authorization: 16 Visits  04/17/2025-07/10/2025  Primary Diagnosis: Vertigo      Assessment:  Patient required max encouragement and emotional support to perform positional testing and canalith repositioning due to her emotional overlay. Patient with geotropic nystagmus during positional testing, therefore, Geufone was performed for R horizontal canal. Patient was with increased emotional overlay during repositioning maneuver and was able to tolerate with max encouragement and emotional support. She tolerated manual therapy of cervical paraspinals and TP release of upper trap and levator mm's with good response following session. Patient would continue to benefit from skilled PT to continue to improve strength, balance, gait, and overall functional mobility.    Plan: Continue per plan of care to improve functional strength and balance.        Current Problem  Problem List Items Addressed This Visit    None  Visit Diagnoses         Codes      Vertigo    -  Primary R42      Headache     R51.9      Convergence insufficiency     H51.11      Impaired mobility     Z74.09            Subjective    Patient states that dizziness in last week only occurred the day of the last treatment session.     Precautions:   Precautions  STEADI Fall Risk Score (The score of 4 or more indicates an increased risk of falling): 2    Pain  Pain Assessment  Pain Assessment: 0-10  0-10 (Numeric) Pain Score: 0 - No  pain    Objective     POSITIONAL TESTING:  POSITION NYSTAGMUS DIRECTION DIZZINESS DURATION (SECONDS)   HEAD CENTER R beat 10/10    ROLL RIGHT Geotropic 10/10 20 beats   ROLL LEFT Ageotropic 4/10 10-15 beats   SIDELYING LEFT Geotropic 10/10 20 beats     Treatments:  Therapeutic Activity x 10 minutes  Positional Testing for assessment of BBPV  Education on seated scapular retraction for postural exercise   Seated scapular retractions x 4  Educated patient on use of lumbar roll in sitting for posture    Canalith Repositioning x 15 minutes  Geufone for R horizontal canal x 1  Precautions sleep propped up 20 degrees, sleep on left right side or back, avoid bending and head movement for the next 24 hours.   *Pt required max encouragement and emotional support due to her emotional overlay response to positional testing and canalith repositioning treatment    Manual Therapy x 15 minutes  Seated STM of bilateral upper trap, levator, and cervical paraspinals - TP release of bilateral upper trap/levator mm's    OP EDUCATION: Instructed patient via verbal cues, tactile cues, and demonstration for proper form and technique of exercises.  Educated patient regarding purpose of all activities.     HOME EXERCISE PROGRAM:  Access Code: [email or web address]  URL: [email or web address]  Date: 04/17/2025  Prepared by: Keaton Fajardo    Program Notes  DO RIGHT EYE AND THEN LEFT EYE. ONE EYE AT A TIME.    Exercises  - Seated Horizontal Smooth Pursuit  - 1 x daily - 7 x weekly - 1 sets - 10 reps  - Seated Vertical Smooth Pursuit  - 1 x daily - 7 x weekly - 1 sets - 10 reps  - Seated Horizontal Saccades  - 1 x daily - 7 x weekly - 1 sets - 10 reps  - Seated Vertical Saccades  - 1 x daily - 7 x weekly - 1 sets - 10 reps  - Seated Upper Trapezius Stretch  - 1 x daily - 7 x weekly - 3 sets - 30 sec  hold  - Seated Levator Scapulae Stretch  - 1 x daily - 7 x weekly - 3 sets - 30 sec hold    Goals:  Active       PT Problem        Patient will report 2/10 dizziness at lowest and 4/10 dizziness at worst to demonstrate improvement in dizziness symptoms and allow for return to PLOF       Start:  04/17/25    Expected End:  07/16/25            Patient will improve DHI score by 10 pts to demonstrate improvement in dizziness symptoms and functional mobility       Start:  04/17/25    Expected End:  07/16/25            Patient will improve cervical side-bend AROM bilaterally to min limitation and cervical rotation to nil limitation to allow for unlimited ability to perform driving, chores, and reading       Start:  04/17/25    Expected End:  07/16/25            Patient will improve Post Concussive Symptom Severity Scale (PCSS) to 50 points to demonstrate decreased concussive symptoms allow for return to PLOF        Start:  04/17/25    Expected End:  07/16/25            Patient will be Independent with home exercise program to demonstrate compliance and self-management       Start:  04/17/25    Expected End:  07/16/25            Patient Stated Goal - improve dizziness       Start:  04/17/25    Expected End:  07/16/25

## 2025-05-07 NOTE — PATIENT INSTRUCTIONS
Thank you for coming to today's cardiology visit.  Your CT chest at the end of March showed heavy calcification in your coronary arteries.  In light of your worsening exertional shortness of breath I suspect this may be a problem regarding the blood flow to your heart muscle leading to the shortness of breath.  To evaluate this I am recommending a procedure called a left heart catheterization.  Please start taking 81 mg aspirin daily.  This helps prevent heart attack  My colleague Dr.Tarek Herrera will be contacting you to schedule the procedure.  Please stop down the lab and get some blood work and a urine test.  I placed a referral to endocrinology to assist with management of your diabetes mellitus.  Please stop at the  and schedule vascular studies.  I am concerned that you have some blockages in your legs causing your heaviness with walking.  Please call me with any questions [phone].  You can also message me through [employer]

## 2025-05-08 ENCOUNTER — TELEPHONE (OUTPATIENT)
Dept: CARDIOLOGY | Facility: CLINIC | Age: 70
End: 2025-05-08
Payer: MEDICARE

## 2025-05-08 ENCOUNTER — TREATMENT (OUTPATIENT)
Dept: PHYSICAL THERAPY | Facility: CLINIC | Age: 70
End: 2025-05-08
Payer: MEDICARE

## 2025-05-08 ENCOUNTER — TELEPHONE (OUTPATIENT)
Dept: CARDIOLOGY | Facility: CLINIC | Age: 70
End: 2025-05-08

## 2025-05-08 DIAGNOSIS — R42 VERTIGO: Primary | ICD-10-CM

## 2025-05-08 DIAGNOSIS — I25.10 ATHEROSCLEROSIS OF NATIVE CORONARY ARTERY OF NATIVE HEART WITHOUT ANGINA PECTORIS: Primary | ICD-10-CM

## 2025-05-08 DIAGNOSIS — R06.02 SHORTNESS OF BREATH ON EXERTION: ICD-10-CM

## 2025-05-08 DIAGNOSIS — I25.10 CORONARY ATHEROSCLEROSIS: ICD-10-CM

## 2025-05-08 DIAGNOSIS — R06.09 DOE (DYSPNEA ON EXERTION): Primary | ICD-10-CM

## 2025-05-08 DIAGNOSIS — Z74.09 IMPAIRED MOBILITY: ICD-10-CM

## 2025-05-08 DIAGNOSIS — H51.11 CONVERGENCE INSUFFICIENCY: ICD-10-CM

## 2025-05-08 DIAGNOSIS — R51.9 HEADACHE: ICD-10-CM

## 2025-05-08 LAB
EST. AVERAGE GLUCOSE BLD GHB EST-MCNC: 177 MG/DL
EST. AVERAGE GLUCOSE BLD GHB EST-SCNC: 9.8 MMOL/L
HBA1C MFR BLD: 7.8 %
LDLC SERPL DIRECT ASSAY-MCNC: 178 MG/DL

## 2025-05-08 PROCEDURE — 97530 THERAPEUTIC ACTIVITIES: CPT | Mod: GP

## 2025-05-08 PROCEDURE — 95992 CANALITH REPOSITIONING PROC: CPT | Mod: GP

## 2025-05-08 PROCEDURE — 97140 MANUAL THERAPY 1/> REGIONS: CPT | Mod: GP

## 2025-05-08 ASSESSMENT — PAIN - FUNCTIONAL ASSESSMENT: PAIN_FUNCTIONAL_ASSESSMENT: 0-10

## 2025-05-08 ASSESSMENT — PAIN SCALES - GENERAL: PAINLEVEL_OUTOF10: 0 - NO PAIN

## 2025-05-08 NOTE — TELEPHONE ENCOUNTER
Pt returned call, reviewed result, and pt confirmed that she is taking 40 mg atorvastatin once daily. RN advised will review with MD and be in touch with any instructions.

## 2025-05-08 NOTE — TELEPHONE ENCOUNTER
Called to review result and inquire about statin. No answer, LVM with triage line requesting call back.

## 2025-05-08 NOTE — TELEPHONE ENCOUNTER
----- Message from Aram Palumbo sent at 5/8/2025  2:33 PM EDT -----  Her chol is high, could you check if she is taking a satin and what dose  ----- Message -----  From: [facility] Lab Results In  Sent: 5/8/2025   6:55 AM EDT  To: Aram Palumbo MD

## 2025-05-09 ENCOUNTER — APPOINTMENT (OUTPATIENT)
Dept: OTOLARYNGOLOGY | Facility: CLINIC | Age: 70
End: 2025-05-09
Payer: MEDICARE

## 2025-05-09 VITALS — TEMPERATURE: 97 F | WEIGHT: 230 LBS | BODY MASS INDEX: 36.96 KG/M2 | HEIGHT: 66 IN

## 2025-05-09 DIAGNOSIS — H93.91 UNSPECIFIED DISORDER OF RIGHT EAR: ICD-10-CM

## 2025-05-09 DIAGNOSIS — E11.9 DIABETES MELLITUS TYPE II, NON INSULIN DEPENDENT (MULTI): ICD-10-CM

## 2025-05-09 DIAGNOSIS — R42 VERTIGO: ICD-10-CM

## 2025-05-09 DIAGNOSIS — I25.10 ATHEROSCLEROSIS OF NATIVE CORONARY ARTERY OF NATIVE HEART WITHOUT ANGINA PECTORIS: Primary | ICD-10-CM

## 2025-05-09 RX ORDER — EZETIMIBE 10 MG/1
10 TABLET ORAL DAILY
Qty: 90 TABLET | Refills: 3 | Status: SHIPPED | OUTPATIENT
Start: 2025-05-09 | End: 2026-05-09

## 2025-05-09 RX ORDER — ROSUVASTATIN CALCIUM 40 MG/1
40 TABLET, COATED ORAL DAILY
Qty: 90 TABLET | Refills: 3 | Status: SHIPPED | OUTPATIENT
Start: 2025-05-09 | End: 2026-05-09
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
## 2025-05-09 NOTE — TELEPHONE ENCOUNTER
Called and spoke to pt, advised she will start rosuvastatin 40 mg and ezetimibe 10 mg daily. Advised to stop atorvastatin as these medications will be taken in place of it. Advised to get fasting labs 2 months after starting these medications. Pt verbalized understanding of conversation and comfortable with plan.

## 2025-05-09 NOTE — PROGRESS NOTES
LDL too high on atorva 40, switching to rosuva 40 and ezetimibe 10, 2 month repeat lipids, cmp and urine albumin

## 2025-05-12 ENCOUNTER — APPOINTMENT (OUTPATIENT)
Dept: PULMONOLOGY | Facility: HOSPITAL | Age: 70
End: 2025-05-12
Payer: MEDICARE

## 2025-05-13 ENCOUNTER — TREATMENT (OUTPATIENT)
Dept: PHYSICAL THERAPY | Facility: CLINIC | Age: 70
End: 2025-05-13
Payer: MEDICARE

## 2025-05-13 DIAGNOSIS — R42 VERTIGO: Primary | ICD-10-CM

## 2025-05-13 DIAGNOSIS — R51.9 HEADACHE: ICD-10-CM

## 2025-05-13 DIAGNOSIS — H51.11 CONVERGENCE INSUFFICIENCY: ICD-10-CM

## 2025-05-13 DIAGNOSIS — Z74.09 IMPAIRED MOBILITY: ICD-10-CM

## 2025-05-13 PROCEDURE — 97530 THERAPEUTIC ACTIVITIES: CPT | Mod: GP

## 2025-05-13 PROCEDURE — 95992 CANALITH REPOSITIONING PROC: CPT | Mod: GP

## 2025-05-13 ASSESSMENT — PAIN - FUNCTIONAL ASSESSMENT: PAIN_FUNCTIONAL_ASSESSMENT: 0-10

## 2025-05-13 ASSESSMENT — PAIN SCALES - GENERAL: PAINLEVEL_OUTOF10: 0 - NO PAIN

## 2025-05-13 NOTE — PROGRESS NOTES
Physical Therapy    Physical Therapy Treatment    Patient Name: Earline Villagran  MRN: [id number]  Today's Date: 5/13/2025  Time Calculation  Start Time: 1040  Stop Time: 1130  Time Calculation (min): 50 min     PT Therapeutic Procedures Time Entry  Therapeutic Activity Time Entry: 23  PT Modalities Time Entry  Canalith Repositioning Time Entry: 20    Visit number: 4  Insurance: Payor: [employer] / Plan: [employer] / Product Type: *No Product type* /   Plan of Care: 7/16/25  Authorization: 16 Visits  04/17/2025-07/10/2025  Primary Diagnosis: Vertigo      Assessment:  Patient required max encouragement and emotional support to perform positional testing and canalith repositioning due to her emotional overlay. Patient with geotropic nystagmus during positional testing, therefore, BBQ Roll was performed for R horizontal canal. Patient with non-direction changing L beating nystagmus with looking up and down, indicating possible corrective saccade or hypofunction. Patient would continue to benefit from skilled PT to continue to improve strength, balance, gait, and overall functional mobility.    Plan: Continue per plan of care to improve functional strength and balance. Re-check canals        Current Problem  Problem List Items Addressed This Visit    None  Visit Diagnoses         Codes      Vertigo    -  Primary R42      Headache     R51.9      Convergence insufficiency     H51.11      Impaired mobility     Z74.09            Subjective    Patient states that dizziness occurred once when she was getting in/out of bed, otherwise, she has been okay. She continues to avoid right sidelying.     Precautions:   Precautions  STEADI Fall Risk Score (The score of 4 or more indicates an increased risk of falling): 2    Pain  Pain Assessment  Pain Assessment: 0-10  0-10 (Numeric) Pain Score: 0 - No pain    Objective     POSITIONAL TESTING:  POSITION NYSTAGMUS DIRECTION DIZZINESS   HEAD CENTER R beat     ROLL RIGHT Geotropic followed by Ageotropic 7/10   ROLL LEFT Geotropic Not rated /10     Treatments:  Therapeutic Activity x 23 minutes  Positional Testing for assessment of BBPV  Education on prolonged lying left with printed instructions following post canalith precautions  Educated patient at length regarding Horizontal Canal BBPV as well as the after effects of the canalith repositioning maneuver performed this date  Performed 5 finger breathing to decrease nervous system response as pt with emotional overlay and anxiety in anticipation of dizziness  Patient with non-direction changing L beating nystagmus with looking up and down, indicating possible corrective saccade or hypofunction    Canalith Repositioning x 20 minutes  BBQ Roll for R horizontal canal x 1  Precautions sleep propped up 20 degrees, sleep on left side or back, avoid bending and head movement for the next 24 hours.   *Pt required max encouragement and emotional support due to her emotional overlay response to positional testing and canalith repositioning treatment        OP EDUCATION: Instructed patient via verbal cues, tactile cues, and demonstration for proper form and technique of exercises.  Educated patient regarding purpose of all activities.     HOME EXERCISE PROGRAM:  Access Code: [email or web address]  URL: [email or web address]  Date: 04/17/2025  Prepared by: Keaton Fajardo    Program Notes  DO RIGHT EYE AND THEN LEFT EYE. ONE EYE AT A TIME.    Exercises  - Seated Horizontal Smooth Pursuit  - 1 x daily - 7 x weekly - 1 sets - 10 reps  - Seated Vertical Smooth Pursuit  - 1 x daily - 7 x weekly - 1 sets - 10 reps  - Seated Horizontal Saccades  - 1 x daily - 7 x weekly - 1 sets - 10 reps  - Seated Vertical Saccades  - 1 x daily - 7 x weekly - 1 sets - 10 reps  - Seated Upper Trapezius Stretch  - 1 x daily - 7 x weekly - 3 sets - 30 sec  hold  - Seated Levator Scapulae Stretch  - 1 x daily - 7 x weekly - 3 sets - 30 sec  hold    Goals:  Active       PT Problem       Patient will report 2/10 dizziness at lowest and 4/10 dizziness at worst to demonstrate improvement in dizziness symptoms and allow for return to PLOF       Start:  04/17/25    Expected End:  07/16/25            Patient will improve DHI score by 10 pts to demonstrate improvement in dizziness symptoms and functional mobility       Start:  04/17/25    Expected End:  07/16/25            Patient will improve cervical side-bend AROM bilaterally to min limitation and cervical rotation to nil limitation to allow for unlimited ability to perform driving, chores, and reading       Start:  04/17/25    Expected End:  07/16/25            Patient will improve Post Concussive Symptom Severity Scale (PCSS) to 50 points to demonstrate decreased concussive symptoms allow for return to PLOF        Start:  04/17/25    Expected End:  07/16/25            Patient will be Independent with home exercise program to demonstrate compliance and self-management       Start:  04/17/25    Expected End:  07/16/25            Patient Stated Goal - improve dizziness       Start:  04/17/25    Expected End:  07/16/25

## 2025-05-14 ENCOUNTER — HOSPITAL ENCOUNTER (OUTPATIENT)
Dept: VASCULAR MEDICINE | Facility: CLINIC | Age: 70
Discharge: HOME | End: 2025-05-14
Payer: MEDICARE

## 2025-05-14 DIAGNOSIS — R00.0 TACHYCARDIA: ICD-10-CM

## 2025-05-14 DIAGNOSIS — I73.9 CLAUDICATION OF BOTH LOWER EXTREMITIES: ICD-10-CM

## 2025-05-14 DIAGNOSIS — R06.09 DOE (DYSPNEA ON EXERTION): ICD-10-CM

## 2025-05-14 DIAGNOSIS — E11.9 DIABETES MELLITUS TYPE II, NON INSULIN DEPENDENT (MULTI): ICD-10-CM

## 2025-05-14 PROCEDURE — 93922 UPR/L XTREMITY ART 2 LEVELS: CPT

## 2025-05-14 PROCEDURE — 93922 UPR/L XTREMITY ART 2 LEVELS: CPT | Performed by: SURGERY
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
## 2025-05-16 ENCOUNTER — CLINICAL SUPPORT (OUTPATIENT)
Dept: AUDIOLOGY | Facility: CLINIC | Age: 70
End: 2025-05-16
Payer: MEDICARE

## 2025-05-16 DIAGNOSIS — R42 VERTIGO: Primary | ICD-10-CM

## 2025-05-16 DIAGNOSIS — H93.92 DISORDER OF LEFT EAR: ICD-10-CM

## 2025-05-16 PROCEDURE — 92557 COMPREHENSIVE HEARING TEST: CPT | Performed by: AUDIOLOGIST

## 2025-05-16 PROCEDURE — 92550 TYMPANOMETRY & REFLEX THRESH: CPT | Mod: 52 | Performed by: AUDIOLOGIST

## 2025-05-20 ENCOUNTER — TREATMENT (OUTPATIENT)
Dept: PHYSICAL THERAPY | Facility: CLINIC | Age: 70
End: 2025-05-20
Payer: MEDICARE

## 2025-05-20 DIAGNOSIS — H51.11 CONVERGENCE INSUFFICIENCY: ICD-10-CM

## 2025-05-20 DIAGNOSIS — Z74.09 IMPAIRED MOBILITY: ICD-10-CM

## 2025-05-20 DIAGNOSIS — R42 VERTIGO: Primary | ICD-10-CM

## 2025-05-20 DIAGNOSIS — R51.9 HEADACHE: ICD-10-CM

## 2025-05-20 PROCEDURE — 97140 MANUAL THERAPY 1/> REGIONS: CPT | Mod: GP

## 2025-05-20 PROCEDURE — 97530 THERAPEUTIC ACTIVITIES: CPT | Mod: GP

## 2025-05-20 PROCEDURE — 95992 CANALITH REPOSITIONING PROC: CPT | Mod: GP

## 2025-05-20 ASSESSMENT — PAIN SCALES - GENERAL: PAINLEVEL_OUTOF10: 0 - NO PAIN

## 2025-05-20 ASSESSMENT — PAIN - FUNCTIONAL ASSESSMENT: PAIN_FUNCTIONAL_ASSESSMENT: 0-10

## 2025-05-20 NOTE — PROGRESS NOTES
Physical Therapy    Physical Therapy Treatment    Patient Name: Earline Villagran  MRN: [id number]  Today's Date: 5/20/2025  Time Calculation  Start Time: 1105  Stop Time: 1145  Time Calculation (min): 40 min     PT Therapeutic Procedures Time Entry  Manual Therapy Time Entry: 16  Therapeutic Activity Time Entry: 9  PT Modalities Time Entry  Canalith Repositioning Time Entry: 15    Visit number: 5  Insurance: Payor: [facility] MEDICARE / Plan: [employer] MEDICARE / Product Type: *No Product type* /   Plan of Care: 7/16/25  Authorization: 16 Visits  04/17/2025-07/10/2025  Primary Diagnosis: Vertigo      Assessment:  Patient required moderate to max encouragement and emotional support to perform positional testing and canalith repositioning due to her emotional overlay. BBQ for R horizontal canal was performed this date to address patient dizziness with positive geotropic nystagmus bilaterally.  Patient would continue to benefit from skilled PT to continue to improve strength, balance, gait, and overall functional mobility.    Plan: Continue per plan of care to improve functional strength and balance. Re-check canals - sidelying test for posterior canal       Current Problem  Problem List Items Addressed This Visit    None  Visit Diagnoses         Codes      Vertigo    -  Primary R42      Headache     R51.9      Convergence insufficiency     H51.11      Impaired mobility     Z74.09            Subjective    Patient states that she is doing a whole lot better, hasn't had a dizziness episode in the last week. She notes that she I rolling head right and then laying on L side at night.     Precautions:   Precautions  STEADI Fall Risk Score (The score of 4 or more indicates an increased risk of falling): 2    Pain  Pain Assessment  Pain Assessment: 0-10  0-10 (Numeric) Pain Score: 0 - No pain    Objective     POSITIONAL TESTING:  POSITION NYSTAGMUS DIRECTION DIZZINESS   HEAD CENTER R beat    ROLL RIGHT  Geotropic 7/10   ROLL LEFT Geotropic 1/10     Treatments:  Therapeutic Activity x 9 minutes  Positional Testing for assessment of BBPV  Education on prolonged lying left with printed instructions following post canalith precautions  Educated patient at length regarding Horizontal Canal BBPV as well as the after effects of the canalith repositioning maneuver performed this date    Canalith Repositioning x 15 minutes  BBQ Roll for R horizontal canal x 1  Precautions sleep propped up 20 degrees, sleep on left side or back, avoid bending and head movement for the next 24 hours.   *Pt required max encouragement and emotional support due to her emotional overlay response to positional testing and canalith repositioning treatment    Manual Therapy x 16 minutes  STM to bilateral upper trap and cervical paraspinals in sitting position      OP EDUCATION: Instructed patient via verbal cues, tactile cues, and demonstration for proper form and technique of exercises.  Educated patient regarding purpose of all activities.     HOME EXERCISE PROGRAM:  Access Code: [email or web address]  URL: [email or web address]  Date: 04/17/2025  Prepared by: Keaton Fajardo    Program Notes  DO RIGHT EYE AND THEN LEFT EYE. ONE EYE AT A TIME.    Exercises  - Seated Horizontal Smooth Pursuit  - 1 x daily - 7 x weekly - 1 sets - 10 reps  - Seated Vertical Smooth Pursuit  - 1 x daily - 7 x weekly - 1 sets - 10 reps  - Seated Horizontal Saccades  - 1 x daily - 7 x weekly - 1 sets - 10 reps  - Seated Vertical Saccades  - 1 x daily - 7 x weekly - 1 sets - 10 reps  - Seated Upper Trapezius Stretch  - 1 x daily - 7 x weekly - 3 sets - 30 sec  hold  - Seated Levator Scapulae Stretch  - 1 x daily - 7 x weekly - 3 sets - 30 sec hold    Goals:  Active       PT Problem       Patient will report 2/10 dizziness at lowest and 4/10 dizziness at worst to demonstrate improvement in dizziness symptoms and allow for return to PLOF       Start:  04/17/25     Expected End:  07/16/25            Patient will improve DHI score by 10 pts to demonstrate improvement in dizziness symptoms and functional mobility       Start:  04/17/25    Expected End:  07/16/25            Patient will improve cervical side-bend AROM bilaterally to min limitation and cervical rotation to nil limitation to allow for unlimited ability to perform driving, chores, and reading       Start:  04/17/25    Expected End:  07/16/25            Patient will improve Post Concussive Symptom Severity Scale (PCSS) to 50 points to demonstrate decreased concussive symptoms allow for return to PLOF        Start:  04/17/25    Expected End:  07/16/25            Patient will be Independent with home exercise program to demonstrate compliance and self-management       Start:  04/17/25    Expected End:  07/16/25            Patient Stated Goal - improve dizziness       Start:  04/17/25    Expected End:  07/16/25

## 2025-05-22 NOTE — PROGRESS NOTES
Physical Therapy    Physical Therapy Treatment    Patient Name: Earline Villagran  MRN: [id number]  Today's Date: 5/27/2025  Time Calculation  Start Time: 1115  Stop Time: 1200  Time Calculation (min): 45 min     PT Therapeutic Procedures Time Entry  Therapeutic Activity Time Entry: 10  PT Modalities Time Entry  Canalith Repositioning Time Entry: 30    Visit number: 6  Insurance: Payor: [employer] / Plan: [employer] / Product Type: *No Product type* /   Plan of Care: 7/16/25  Authorization: 16 Visits  04/17/2025-07/10/2025  Primary Diagnosis: Vertigo      Assessment:  Patient required moderate to max encouragement and emotional support to perform positional testing and canalith repositioning due to her emotional overlay. Gufoni for L horizontal canal was performed this date to address patient dizziness as her symptoms and geotropic nystagmus on L sidelying test were more robust and briskness compared to R sidelying test. Patient would continue to benefit from skilled PT to continue to improve strength, balance, gait, and overall functional mobility.    Plan: Continue per plan of care to improve functional strength and balance. Re-check canals - sidelying test for posterior canal       Current Problem  Problem List Items Addressed This Visit    None  Visit Diagnoses         Codes      Vertigo    -  Primary R42      Headache     R51.9      Convergence insufficiency     H51.11      Impaired mobility     Z74.09            Subjective    Patient states that she managed to roll onto R side for 1 min with  present and then rolled to sleep on L; reported room spinning. She states that she has taken Mclazine PRN, loli when she leaves the vestibular therapy session. She is able to bend down more often without symptoms (short duration).    Precautions:   Precautions  VANDAADI Fall Risk Score (The score of 4 or more indicates an increased risk of falling): 2  Precautions Comment: Mild Fall  Risk    Pain  Pain Assessment  Pain Assessment: 0-10  0-10 (Numeric) Pain Score: 0 - No pain    Objective     POSITIONAL TESTING:  POSITION NYSTAGMUS DIRECTION DIZZINESS   Sidelying Right Geotropic 7/10   Sidelying Left Geotropic- more robust and brisk L > R 9/10   Comments: L side with more brisk and robust geotropic nystagmus compared to R; it was also more symptomatic this date so treated for L horizontal canal accordingly this date    Treatments:  Therapeutic Activity x 10 minutes  Positional Testing for assessment of BBPV  Precautions sleep propped up 20 degrees, sleep on left side or back, avoid bending and head movement for the next 24 hours  Educated patient at length regarding high risk behaviors for BPPV  Educated patient on bed mobility - roll to opposite side of which you wake up on to assist with clearing  Educated patient on anatomy of semi-circular canals and BPPV    Canalith Repositioning x 30 minutes  Gufoni for L horizontal canal x 2  *Pt required max encouragement and emotional support due to her emotional overlay response to positional testing and canalith repositioning treatment      OP EDUCATION: Instructed patient via verbal cues, tactile cues, and demonstration for proper form and technique of exercises.  Educated patient regarding purpose of all activities.     HOME EXERCISE PROGRAM:  Access Code: [email or web address]  URL: [email or web address]  Date: 04/17/2025  Prepared by: Keaton Fajardo    Program Notes  DO RIGHT EYE AND THEN LEFT EYE. ONE EYE AT A TIME.    Exercises  - Seated Horizontal Smooth Pursuit  - 1 x daily - 7 x weekly - 1 sets - 10 reps  - Seated Vertical Smooth Pursuit  - 1 x daily - 7 x weekly - 1 sets - 10 reps  - Seated Horizontal Saccades  - 1 x daily - 7 x weekly - 1 sets - 10 reps  - Seated Vertical Saccades  - 1 x daily - 7 x weekly - 1 sets - 10 reps  - Seated Upper Trapezius Stretch  - 1 x daily - 7 x weekly - 3 sets - 30 sec  hold  - Seated Levator  Scapulae Stretch  - 1 x daily - 7 x weekly - 3 sets - 30 sec hold    Goals:  Active       PT Problem       Patient will report 2/10 dizziness at lowest and 4/10 dizziness at worst to demonstrate improvement in dizziness symptoms and allow for return to PLOF       Start:  04/17/25    Expected End:  07/16/25            Patient will improve DHI score by 10 pts to demonstrate improvement in dizziness symptoms and functional mobility       Start:  04/17/25    Expected End:  07/16/25            Patient will improve cervical side-bend AROM bilaterally to min limitation and cervical rotation to nil limitation to allow for unlimited ability to perform driving, chores, and reading       Start:  04/17/25    Expected End:  07/16/25            Patient will improve Post Concussive Symptom Severity Scale (PCSS) to 50 points to demonstrate decreased concussive symptoms allow for return to PLOF        Start:  04/17/25    Expected End:  07/16/25            Patient will be Independent with home exercise program to demonstrate compliance and self-management       Start:  04/17/25    Expected End:  07/16/25            Patient Stated Goal - improve dizziness       Start:  04/17/25    Expected End:  07/16/25

## 2025-05-23 ENCOUNTER — OFFICE VISIT (OUTPATIENT)
Dept: PULMONOLOGY | Facility: CLINIC | Age: 70
End: 2025-05-23
Payer: MEDICARE

## 2025-05-23 VITALS
BODY MASS INDEX: 36.64 KG/M2 | OXYGEN SATURATION: 98 % | DIASTOLIC BLOOD PRESSURE: 82 MMHG | TEMPERATURE: 97.6 F | RESPIRATION RATE: 18 BRPM | HEART RATE: 116 BPM | HEIGHT: 66 IN | SYSTOLIC BLOOD PRESSURE: 127 MMHG | WEIGHT: 228 LBS

## 2025-05-23 DIAGNOSIS — J44.9 CHRONIC OBSTRUCTIVE PULMONARY DISEASE, UNSPECIFIED COPD TYPE (MULTI): ICD-10-CM

## 2025-05-23 DIAGNOSIS — R06.02 SOB (SHORTNESS OF BREATH): ICD-10-CM

## 2025-05-23 PROCEDURE — 3074F SYST BP LT 130 MM HG: CPT | Performed by: INTERNAL MEDICINE

## 2025-05-23 PROCEDURE — 1159F MED LIST DOCD IN RCRD: CPT | Performed by: INTERNAL MEDICINE

## 2025-05-23 PROCEDURE — 1160F RVW MEDS BY RX/DR IN RCRD: CPT | Performed by: INTERNAL MEDICINE

## 2025-05-23 PROCEDURE — 3079F DIAST BP 80-89 MM HG: CPT | Performed by: INTERNAL MEDICINE

## 2025-05-23 PROCEDURE — 99215 OFFICE O/P EST HI 40 MIN: CPT | Performed by: INTERNAL MEDICINE

## 2025-05-23 PROCEDURE — 1036F TOBACCO NON-USER: CPT | Performed by: INTERNAL MEDICINE

## 2025-05-23 PROCEDURE — 3008F BODY MASS INDEX DOCD: CPT | Performed by: INTERNAL MEDICINE

## 2025-05-23 ASSESSMENT — COPD QUESTIONNAIRES
QUESTION6_LEAVINGHOUSE: 0 - I AM CONFIDENT LEAVING MY HOME DESPITE MY LUNG CONDITION
QUESTION1_COUGHFREQUENCY: 1
QUESTION8_ENERGYLEVEL: 3
QUESTION2_CHESTPHLEGM: 0 - I HAVE NO PHLEGM (MUCUS) IN MY CHEST AT ALL
QUESTION3_CHESTTIGHTNESS: 1
QUESTION7_SLEEPQUALITY: 2
QUESTION4_WALKINCLINE: 1
QUESTION5_HOMEACTIVITIES: 1
CAT_TOTALSCORE: 9

## 2025-05-23 ASSESSMENT — ENCOUNTER SYMPTOMS
COUGH: 0
CHEST TIGHTNESS: 1
SHORTNESS OF BREATH: 1
HEADACHES: 1
WHEEZING: 0
FATIGUE: 1

## 2025-05-23 NOTE — PROGRESS NOTES
Department of Medicine  Division of Pulmonary, Critical Care, and Sleep Medicine  Location  [facility], [address]    I was asked by Dawn Tsang MD, to evaluate Earline Villagran for COPD. I have independently interviewed and examined the patient in the office and reviewed available records.     Physician HPI (5/23/2025):  69 y.o. female who was last evaluated in fellow's clinic in March 2025. She had PFT which was terminated early due to chest pain and tachycardia. Spirometry was normal. CTA negative for P.E. but made mention of groundglass opacities in the left upper lobe along with mosaic perfusion throughout the lungs.    Urgent Care 2/3/2025. Got Rx prednisone and azithromycin. Went to ER on 2/9/2025. CTA for PE negative. Got another Rx for azithromycin and prednisone.    Complaining of vertigo and migraines today. Exercise tolerance about 100 feet. Gets short of breath with stairs.  He symptoms relatively well within the last few months.  She has had a negative cardiac stress test, but due to high clinical suspicion for CAD, she is scheduled for a left heart cath next week.  She does endorse some chest pressure when ambulating.  She is prescribed Trelegy inhaler which she does not feel any benefit from.  She does use albuterol 3 times per week which she thinks helps.  She has not noticed an appreciable increase in her heart rate with albuterol use, but does state that she has been tachycardic for some years.  She is a former smoker who quit in 1999.    PMH:  Medical History[1]    PSH:  Surgical History[2]    FHx:  Family History[3]    Social Hx:  Social History[4]    Immunization History:  Immunization History   Administered Date(s) Administered    COVID-19, mRNA, LNP-S, PF, 30 mcg/0.3 mL dose 02/11/2021, 03/04/2021, 11/14/2021    Flu vaccine, quadrivalent, high-dose, preservative free, age 65y+ (FLUZONE) 09/10/2020, 10/07/2022, 10/21/2023    Flu vaccine, trivalent, preservative free,  no egg protein, age 18y+ (Flublok) 09/07/2024    Influenza Whole 10/04/2013    Influenza, Seasonal, Quadrivalent, Adjuvanted 11/09/2021    Influenza, injectable, quadrivalent 10/31/2017, 09/25/2018, 10/05/2019    Influenza, seasonal, injectable 01/11/2000, 10/27/2005, 11/09/2006, 11/01/2007, 10/24/2008, 10/08/2011, 09/25/2012, 10/05/2013    Moderna COVID-19 vaccine, 12 years and older (50mcg/0.5mL)(Spikevax) 10/21/2023    PPD Test 11/13/1998, 02/29/2000, 05/08/2001, 05/07/2002, 05/09/2002, 04/23/2003, 04/06/2004, 05/02/2005, 05/04/2005, 05/08/2006, 04/18/2007, 04/16/2008    Pfizer COVID-19 vaccine, 12 years and older, (30mcg/0.3mL) (Comirnaty) 09/26/2024    Pfizer Gray Cap SARS-CoV-2 05/23/2022    Pneumococcal conjugate vaccine, 13-valent (PREVNAR 13) 08/24/2021    Pneumococcal polysaccharide vaccine, 23-valent, age 2 years and older (PNEUMOVAX 23) 09/09/2005       Current Medications:  Current Outpatient Medications   Medication Instructions    albuterol 90 mcg/actuation inhaler 2 puffs, inhalation, Every 6 hours PRN    amLODIPine (NORVASC) 5 mg, oral, Daily    blood sugar diagnostic (OneTouch Ultra Test) strip TEST GLUCOSE 2 TO 3 TIMES DAILY    carvedilol (COREG) 3.125 mg, oral, 2 times daily    estradiol (Estrace) 0.01 % (0.1 mg/gram) vaginal cream Apply 0.5g (blueberry size amount) nightly for 2 weeks, then 2 - 3 times per week    ezetimibe (ZETIA) 10 mg, oral, Daily    fluticasone (Flonase) 50 mcg/actuation nasal spray 2 sprays, Daily    fluticasone-umeclidin-vilanter (Trelegy Ellipta) 100-62.5-25 mcg blister with device 1 puff, inhalation, Daily    gabapentin (NEURONTIN) 100 mg, oral, 3 times daily    glimepiride (AMARYL) 4 mg, oral    hydroCHLOROthiazide (HYDRODIURIL) 25 mg, oral, Daily    hydrOXYzine HCL (Atarax) 10 mg tablet TAKE 1 TABLET BY MOUTH EVERY 8  HOURS IF NEEDED FOR ITCHING    levothyroxine (SYNTHROID, LEVOXYL) 125 mcg, oral, Daily    meclizine (ANTIVERT) 25 mg, oral, 3 times daily PRN    Mounjaro  "15 mg, subcutaneous, Weekly    nortriptyline (PAMELOR) 25 mg, oral, 2 times daily    omeprazole (PRILOSEC) 20 mg, oral, Daily    OneTouch Delica Plus Lancet 33 gauge misc USE WITH BLOOD GLUCOSE TEST ONCE DAILY    OneTouch Ultra2 Meter misc 1 DEVICE ONCE DAILY. TEST ONE TIME A DAY. INSULIN DEP? NO E11.9 DM 2    rosuvastatin (CRESTOR) 40 mg, oral, Daily    Ubrelvy 50 mg, oral, Daily PRN        Drug Allergies/Intolerances:  RX Allergies[5]     Review of Systems:  Review of Systems   Constitutional:  Positive for fatigue.   Respiratory:  Positive for chest tightness and shortness of breath. Negative for cough and wheezing.    Cardiovascular:  Negative for chest pain and leg swelling.   Neurological:  Positive for headaches.        All other review of systems are negative and/or non-contributory.    Physical Examination:  Vitals:    05/23/25 0930   BP: 127/82   BP Location: Right arm   Patient Position: Sitting   Pulse: (!) 116   Resp: 18   Temp: 36.4 °C (97.6 °F)   TempSrc: Temporal   SpO2: 98%   Weight: 103 kg (228 lb)   Height: 1.676 m (5' 6\")        GEN: appears well  ENT: Mallampati III,   CV: tachycardic, regular  LUNGS: good effort, clear bilaterally, no w/r/r  EXT: no edema, cyanosis, clubbing        Exacerbation History      N/A    Pulmonary Function Test Results     3/31/2025:  FVC: 2.39 L (95%)  FEV1: 2.15 L (110%)  FEV1/FVC: 0.90  TLC: Not tested  DLCO: Not tested  Test terminated early due to chest pain and tachycardia of 120    Sleep Study     None    CAT and mMRC     N/A    Peak Flow and ACT     N/A    Chest Radiograph     XR chest 1 view 03/31/2025      Impression  No acute pathologic findings are identified.  There is no interval change when compared to the previous examination.        Chest CT Scan     CT angio chest for pulmonary embolism 03/31/2025    Impression  1.No pulmonary embolism.  2.Small groundglass opacities in the left upper lobe. Correlate  clinically for possible small areas of " pneumonia.  3.Mild mosaic perfusion throughout the lungs suggestive of air  trapping or small airways disease.  4.Severe coronary artery calcifications.  5.Hepatic steatosis.  6.Mild bilateral renal cortical atrophy.  Signed by Wyatt Pérez MD    2/9/2025:  No PE or other acute pulmonary process.        Bronchoscopy     None    Labs     Lab Results   Component Value Date    WBC 10.8 03/31/2025    HGB 12.6 03/31/2025    HCT 38.0 03/31/2025    MCV 91 03/31/2025     03/31/2025     Lab Results   Component Value Date    BNP 19 03/31/2025     Lab Results   Component Value Date    EOSABS 0.15 03/31/2025    EOSABS 0.12 02/09/2025    EOSABS 0.08 09/12/2024    EOSABS 0.08 05/19/2024     Bicarbonate (mmol/L)   Date Value   03/31/2025 25   02/09/2025 23   09/12/2024 31       Echocardiogram     Nuclear stress test 4/25/2025:  1. No clinical or electrocardiographic evidence for ischemia at a maximal infusion.   2. Nuclear image results are reported separately.       Echo 5/20/2024:   1. Left ventricular systolic function is normal with a 60-65% estimated ejection fraction.   2. Spectral Doppler shows an impaired relaxation pattern of left ventricular diastolic filling.   3. TAPSE= 20.3 mm    ASSESSMENT & PLAN     Problem List Items Addressed This Visit    None  Visit Diagnoses         SOB (shortness of breath)        Relevant Orders    Follow Up In Pulmonology      Chronic obstructive pulmonary disease, unspecified COPD type (Multi)                 Summary:  69 y.o. female here for dyspnea evaluation.  She does not have COPD based on spirometry from March 2025.  We do not have any lung volume or diffusion capacity measurements as the test was terminated prematurely.  She is likely to have no benefit from Trelegy, and recommend discontinuing this medication. At this time, she does not appear to have active pulmonary disease which is contributing to her dyspnea.    Agree with pursuit of left heart cath next week.  If CAD  is absent, then we will perform a full PFT to assess lung volumes and diffusion capacities.    Plan:  -Discontinue Trelegy  -Possible PFT after [facility]    Follow-up: 2025        Manuel Hansen DO  Staff Physician - Pulmonary & Critical Care  25 9:46 AM  Office number: [phone]   Fax number:  [phone]        [1]   Past Medical History:  Diagnosis Date    Allergic     Arthritis     Cluster headache     Concussion with loss of consciousness of 30 minutes or less, subsequent encounter 2021    Concussion with loss of consciousness of 30 minutes or less, subsequent encounter    CTS (carpal tunnel syndrome) 3/2000    Diabetes mellitus (Multi) 2000    GERD (gastroesophageal reflux disease)     Herpes zoster 2024    Hypertension 2005    Migraine 21    Personal history of other diseases of the circulatory system     History of hypertension    Personal history of other specified conditions     History of seizure    Personal history of other specified conditions 2020    History of vertigo    Seizures (Multi)     Stroke (Multi) 1976   [2]   Past Surgical History:  Procedure Laterality Date    BREAST BIOPSY      HERNIA REPAIR  Baby    HYSTERECTOMY  b2    OTHER SURGICAL HISTORY  2021    Subtotal thyroidectomy    THYROID SURGERY     [3]   Family History  Problem Relation Name Age of Onset    Hypertension Father Dylan Ruelas     Stroke Father Dylan Ruelas     Cancer Maternal Grandmother Kim rowland     Breast cancer Cousin      Ovarian cancer Cousin      Glaucoma Neg Hx      Macular degeneration Neg Hx     [4]   Social History  Socioeconomic History    Marital status:    Tobacco Use    Smoking status: Former     Current packs/day: 0.00     Average packs/day: 0.7 packs/day for 38.4 years (27.2 ttl pk-yrs)     Types: Cigarettes     Start date: 1976     Quit date: 10/19/1999     Years since quittin.6     Passive exposure: Never    Smokeless tobacco:  "Never    Tobacco comments:     Over 25 years ago   Vaping Use    Vaping status: Never Used   Substance and Sexual Activity    Alcohol use: Not Currently     Comment: Once a month maybe    Drug use: Never    Sexual activity: Yes     Partners: Male, Choose not to disclose     Birth control/protection: I.U.D.     Comment: Hyerstericamia   [5]   Allergies  Allergen Reactions    Raspberry Shortness of breath    Ciprofloxacin Rash     Itching, Itching    Clindamycin Diarrhea     Other Reaction(s): GI Upset      diarrhea, diarrhea    Green Tea Swelling     Lip swells   W green tea and neto, Lip swells   W green tea and neto    Penicillins Syncope     Other Reaction(s): Other (See Comments), Syncope, Unknown      Other reaction(s): Other (See Comments) PASSED OUT, PASSED OUT      PASSED OUT, PASSED OUT      \"Passed out\"    Sulfamethoxazole-Trimethoprim Hives, Unknown and Rash     Other Reaction(s): Unknown    Phenylephrine-Guaifenesin Palpitations     Other Reaction(s): Other: See Comments      Other reaction(s): Other: See Comments Fast heart rate      Fast heart rate     "

## 2025-05-27 ENCOUNTER — TREATMENT (OUTPATIENT)
Dept: PHYSICAL THERAPY | Facility: CLINIC | Age: 70
End: 2025-05-27
Payer: MEDICARE

## 2025-05-27 DIAGNOSIS — H51.11 CONVERGENCE INSUFFICIENCY: ICD-10-CM

## 2025-05-27 DIAGNOSIS — R42 VERTIGO: Primary | ICD-10-CM

## 2025-05-27 DIAGNOSIS — Z74.09 IMPAIRED MOBILITY: ICD-10-CM

## 2025-05-27 DIAGNOSIS — R51.9 HEADACHE: ICD-10-CM

## 2025-05-27 PROCEDURE — 97530 THERAPEUTIC ACTIVITIES: CPT | Mod: GP

## 2025-05-27 PROCEDURE — 95992 CANALITH REPOSITIONING PROC: CPT | Mod: GP

## 2025-05-27 ASSESSMENT — PAIN - FUNCTIONAL ASSESSMENT: PAIN_FUNCTIONAL_ASSESSMENT: 0-10

## 2025-05-27 ASSESSMENT — PAIN SCALES - GENERAL: PAINLEVEL_OUTOF10: 0 - NO PAIN

## 2025-05-28 ENCOUNTER — HOSPITAL ENCOUNTER (OUTPATIENT)
Facility: HOSPITAL | Age: 70
Setting detail: OUTPATIENT SURGERY
Discharge: HOME | End: 2025-05-28
Attending: HOSPITALIST | Admitting: HOSPITALIST
Payer: MEDICARE

## 2025-05-28 VITALS
HEART RATE: 105 BPM | RESPIRATION RATE: 15 BRPM | BODY MASS INDEX: 35.64 KG/M2 | SYSTOLIC BLOOD PRESSURE: 136 MMHG | HEIGHT: 67 IN | OXYGEN SATURATION: 100 % | TEMPERATURE: 96.6 F | DIASTOLIC BLOOD PRESSURE: 82 MMHG | WEIGHT: 227.07 LBS

## 2025-05-28 DIAGNOSIS — I25.10 CORONARY ATHEROSCLEROSIS: ICD-10-CM

## 2025-05-28 DIAGNOSIS — R06.09 DOE (DYSPNEA ON EXERTION): Primary | ICD-10-CM

## 2025-05-28 LAB
ANION GAP SERPL CALC-SCNC: 15 MMOL/L (ref 10–20)
BUN SERPL-MCNC: 16 MG/DL (ref 6–23)
CALCIUM SERPL-MCNC: 9.2 MG/DL (ref 8.6–10.3)
CHLORIDE SERPL-SCNC: 102 MMOL/L (ref 98–107)
CO2 SERPL-SCNC: 24 MMOL/L (ref 21–32)
CREAT SERPL-MCNC: 1.16 MG/DL (ref 0.5–1.05)
EGFRCR SERPLBLD CKD-EPI 2021: 51 ML/MIN/1.73M*2
ERYTHROCYTE [DISTWIDTH] IN BLOOD BY AUTOMATED COUNT: 12.7 % (ref 11.5–14.5)
GLUCOSE SERPL-MCNC: 193 MG/DL (ref 74–99)
HCT VFR BLD AUTO: 35.4 % (ref 36–46)
HGB BLD-MCNC: 12.2 G/DL (ref 12–16)
MCH RBC QN AUTO: 30.7 PG (ref 26–34)
MCHC RBC AUTO-ENTMCNC: 34.5 G/DL (ref 32–36)
MCV RBC AUTO: 89 FL (ref 80–100)
NRBC BLD-RTO: 0 /100 WBCS (ref 0–0)
PLATELET # BLD AUTO: 283 X10*3/UL (ref 150–450)
POTASSIUM SERPL-SCNC: 4 MMOL/L (ref 3.5–5.3)
RBC # BLD AUTO: 3.97 X10*6/UL (ref 4–5.2)
SODIUM SERPL-SCNC: 137 MMOL/L (ref 136–145)
WBC # BLD AUTO: 9.3 X10*3/UL (ref 4.4–11.3)

## 2025-05-28 PROCEDURE — 85027 COMPLETE CBC AUTOMATED: CPT | Performed by: NURSE PRACTITIONER

## 2025-05-28 PROCEDURE — 7100000010 HC PHASE TWO TIME - EACH INCREMENTAL 1 MINUTE: Performed by: HOSPITALIST

## 2025-05-28 PROCEDURE — 96373 THER/PROPH/DIAG INJ IA: CPT | Performed by: HOSPITALIST

## 2025-05-28 PROCEDURE — 99222 1ST HOSP IP/OBS MODERATE 55: CPT | Performed by: NURSE PRACTITIONER

## 2025-05-28 PROCEDURE — 2500000001 HC RX 250 WO HCPCS SELF ADMINISTERED DRUGS (ALT 637 FOR MEDICARE OP): Performed by: NURSE PRACTITIONER

## 2025-05-28 PROCEDURE — C1769 GUIDE WIRE: HCPCS | Performed by: HOSPITALIST

## 2025-05-28 PROCEDURE — 2550000001 HC RX 255 CONTRASTS: Mod: JW | Performed by: HOSPITALIST

## 2025-05-28 PROCEDURE — 93458 L HRT ARTERY/VENTRICLE ANGIO: CPT | Performed by: HOSPITALIST

## 2025-05-28 PROCEDURE — 36415 COLL VENOUS BLD VENIPUNCTURE: CPT | Performed by: NURSE PRACTITIONER

## 2025-05-28 PROCEDURE — 99152 MOD SED SAME PHYS/QHP 5/>YRS: CPT | Performed by: HOSPITALIST

## 2025-05-28 PROCEDURE — 7100000009 HC PHASE TWO TIME - INITIAL BASE CHARGE: Performed by: HOSPITALIST

## 2025-05-28 PROCEDURE — C1894 INTRO/SHEATH, NON-LASER: HCPCS | Performed by: HOSPITALIST

## 2025-05-28 PROCEDURE — C1760 CLOSURE DEV, VASC: HCPCS | Performed by: HOSPITALIST

## 2025-05-28 PROCEDURE — 2720000007 HC OR 272 NO HCPCS: Performed by: HOSPITALIST

## 2025-05-28 PROCEDURE — 80048 BASIC METABOLIC PNL TOTAL CA: CPT | Performed by: NURSE PRACTITIONER

## 2025-05-28 PROCEDURE — 2500000005 HC RX 250 GENERAL PHARMACY W/O HCPCS: Performed by: HOSPITALIST

## 2025-05-28 PROCEDURE — 2500000004 HC RX 250 GENERAL PHARMACY W/ HCPCS (ALT 636 FOR OP/ED): Performed by: HOSPITALIST

## 2025-05-28 PROCEDURE — 99153 MOD SED SAME PHYS/QHP EA: CPT | Performed by: HOSPITALIST

## 2025-05-28 RX ORDER — DEXTROSE 50 % IN WATER (D50W) INTRAVENOUS SYRINGE
12.5
Status: DISCONTINUED | OUTPATIENT
Start: 2025-05-28 | End: 2025-05-28 | Stop reason: HOSPADM

## 2025-05-28 RX ORDER — HEPARIN SODIUM 1000 [USP'U]/ML
INJECTION, SOLUTION INTRAVENOUS; SUBCUTANEOUS AS NEEDED
Status: DISCONTINUED | OUTPATIENT
Start: 2025-05-28 | End: 2025-05-28 | Stop reason: HOSPADM

## 2025-05-28 RX ORDER — SODIUM CHLORIDE 9 MG/ML
150 INJECTION, SOLUTION INTRAVENOUS CONTINUOUS
Status: DISCONTINUED | OUTPATIENT
Start: 2025-05-28 | End: 2025-05-28 | Stop reason: HOSPADM

## 2025-05-28 RX ORDER — LIDOCAINE HYDROCHLORIDE 10 MG/ML
INJECTION, SOLUTION EPIDURAL; INFILTRATION; INTRACAUDAL; PERINEURAL AS NEEDED
Status: DISCONTINUED | OUTPATIENT
Start: 2025-05-28 | End: 2025-05-28 | Stop reason: HOSPADM

## 2025-05-28 RX ORDER — DEXTROSE 50 % IN WATER (D50W) INTRAVENOUS SYRINGE
25
Status: DISCONTINUED | OUTPATIENT
Start: 2025-05-28 | End: 2025-05-28 | Stop reason: HOSPADM

## 2025-05-28 RX ORDER — NAPROXEN SODIUM 220 MG/1
81 TABLET, FILM COATED ORAL ONCE
Status: COMPLETED | OUTPATIENT
Start: 2025-05-28 | End: 2025-05-28

## 2025-05-28 RX ORDER — ACETAMINOPHEN 160 MG/5ML
650 SOLUTION ORAL EVERY 6 HOURS PRN
Status: DISCONTINUED | OUTPATIENT
Start: 2025-05-28 | End: 2025-05-28 | Stop reason: HOSPADM

## 2025-05-28 RX ORDER — FENTANYL CITRATE 50 UG/ML
INJECTION, SOLUTION INTRAMUSCULAR; INTRAVENOUS AS NEEDED
Status: DISCONTINUED | OUTPATIENT
Start: 2025-05-28 | End: 2025-05-28 | Stop reason: HOSPADM

## 2025-05-28 RX ORDER — ACETAMINOPHEN 325 MG/1
650 TABLET ORAL EVERY 6 HOURS PRN
Status: DISCONTINUED | OUTPATIENT
Start: 2025-05-28 | End: 2025-05-28 | Stop reason: HOSPADM

## 2025-05-28 RX ORDER — SODIUM CHLORIDE 9 MG/ML
100 INJECTION, SOLUTION INTRAVENOUS CONTINUOUS
Status: DISCONTINUED | OUTPATIENT
Start: 2025-05-28 | End: 2025-05-28

## 2025-05-28 RX ORDER — ASPIRIN 81 MG/1
81 TABLET ORAL DAILY
COMMUNITY

## 2025-05-28 RX ORDER — NITROGLYCERIN 40 MG/100ML
INJECTION INTRAVENOUS AS NEEDED
Status: DISCONTINUED | OUTPATIENT
Start: 2025-05-28 | End: 2025-05-28 | Stop reason: HOSPADM

## 2025-05-28 RX ORDER — ACETAMINOPHEN 650 MG/1
650 SUPPOSITORY RECTAL EVERY 6 HOURS PRN
Status: DISCONTINUED | OUTPATIENT
Start: 2025-05-28 | End: 2025-05-28 | Stop reason: HOSPADM

## 2025-05-28 RX ORDER — MIDAZOLAM HYDROCHLORIDE 1 MG/ML
INJECTION, SOLUTION INTRAMUSCULAR; INTRAVENOUS AS NEEDED
Status: DISCONTINUED | OUTPATIENT
Start: 2025-05-28 | End: 2025-05-28 | Stop reason: HOSPADM

## 2025-05-28 RX ADMIN — ASPIRIN 81 MG CHEWABLE TABLET 81 MG: 81 TABLET CHEWABLE at 07:22

## 2025-05-28 ASSESSMENT — PAIN SCALES - GENERAL
PAINLEVEL_OUTOF10: 0 - NO PAIN

## 2025-05-28 ASSESSMENT — ENCOUNTER SYMPTOMS
SHORTNESS OF BREATH: 1
DIZZINESS: 1
PSYCHIATRIC NEGATIVE: 1
GASTROINTESTINAL NEGATIVE: 1
CONSTITUTIONAL NEGATIVE: 1
MUSCULOSKELETAL NEGATIVE: 1
HEMATOLOGIC/LYMPHATIC NEGATIVE: 1
LIGHT-HEADEDNESS: 1
ALLERGIC/IMMUNOLOGIC NEGATIVE: 1
EYES NEGATIVE: 1
ENDOCRINE NEGATIVE: 1

## 2025-05-28 ASSESSMENT — PAIN - FUNCTIONAL ASSESSMENT
PAIN_FUNCTIONAL_ASSESSMENT: 0-10

## 2025-05-28 NOTE — NURSING NOTE
Home going instructions specific to procedure given. Easily arousable; responding appropriately. Vs +/- 20% of pre procedure status. Significant complications are absent. Ambulates without dizziness/age appropriate activity, pulse ox above or equal to 92% on room air/ordered oxygen treatment. Care Plan Complete. Discharge to home accompanied by (family). Discharged via wheelchair. PIV removed (C/D/I).

## 2025-05-28 NOTE — DISCHARGE INSTRUCTIONS
CARDIAC CATHETERIZATION DISCHARGE INSTRUCTIONS      Follow up with Dr. Palumbo as scheduled on 6/4/25 at 8:20 AM.     Start taking Ezetimibe 10 mg daily to help control cholesterol levels and reduce risk of heart attack or stroke.      FOR SUDDEN AND SEVERE CHEST PAIN, SHORTNESS OF BREATH, EXCESSIVE BLEEDING, SIGNS OF STROKE, OR CHANGES IN MENTAL STATUS YOU SHOULD CALL 911 IMMEDIATELY.     If your provider has prescribed aspirin and/or clopidogrel (Plavix), or prasugrel (Effient), or ticagrelor (Brilinta), DO NOT STOP THESE MEDICATIONS for any reason without talking to your cardiologist first. If any of these were prescribed, you must take them every day without missing a single dose. If you are getting low on these medications, contact your provider immediately for a refill.     FOR NEXT 24 HOURS  - Upon discharge, you should return home and rest for the remainder of the day and evening. You do not have to stay on bed rest but should not be very active.  It is recommended a responsible adult be with you for the first 24 hours after the procedure.    - No driving for 24 hours after procedure. Please arrange for someone to drive you home from the hospital today.     - Do not drive, operate machinery, or use power tools for 24 hours after your procedure.     - Do not make any legal decisions for 24 hours after your procedure.     - Do not drink alcoholic beverages for 24 hours after your procedure.    -Stay extra hydrated for 24 hours after your procedure; goal fluid intake around 64-72 ounces for the next 24 hours.       WOUND CARE   *FOR FEMORAL (LEG) ACCESS*  ·      Avoid heavy lifting (over 10 pounds) for 7 days, squatting or excessive bending for 2 days, and strenuous exercise for 7 days.  ·      No submerged bathing, swimming, or hot tubs for the next 7 days, or until fully healed.  ·      Avoid sexual activity for 3-4 days until any groin discomfort has ceased.     *FOR RADIAL (WRIST) ACCESS*  ·      No  lifting more than 5 pounds or excessive use of the wrist for 24 hours - for example, treat your wrist as if it is sprained.  ·      Do not engage in vigorous activities (tennis, golf, bowling, weights) for at least 48 hours after the procedure.  ·      Do not submerge the wrist for 7 days after the procedure.  ·      You should expect mild tingling in your hand and tenderness at the puncture site for up to 3 days.    - The transparent dressing should be removed from the site 24 hours after the procedure.  Wash the site gently with soap and water. Rinse well and pat dry. Keep the area clean and dry. You may apply a Band-Aid to the site. Avoid lotions, ointments, or powders until fully healed.     - You may shower the day after your procedure.      - It is normal to notice a small bruise around the puncture site and/or a small grape sized or smaller lump. Any large bruising or large lump warrants a call to the office.     - If bleeding should occur, lay down and apply pressure to the affected area for 10 minutes.  If the bleeding stops notify your physician.  If there is a large amount of bleeding or spurting of blood CALL 911 immediately.  DO NOT drive yourself to the hospital.    - You may experience some tenderness, bruising or minimal inflammation.  If you have any concerns, you may contact the Cath Lab or if any of these symptoms become excessive, contact your cardiologist or go to the emergency room.     OTHER INSTRUCTIONS  - You may take acetaminophen (Tylenol) as directed for discomfort.  If pain is not relieved with acetaminophen (Tylenol), contact your doctor.    - If you notice or experience any of the following, you should notify your doctor or seek medical attention  Chest pain or discomfort  Change in mental status or weakness in extremities.  Dizziness, light headedness, or feeling faint.  Change in the site where the procedure was performed, such as bleeding or an increased area of bruising or  swelling.  Tingling, numbness, pain, or coolness in the leg/arm beyond the site where the procedure was performed.  Signs of infection (i.e. shaking chills, temperature > 100 degrees Fahrenheit, warmth, redness) in the leg/arm area where the procedure was performed.  Changes in urination   Bloody or black stools  Vomiting blood  Severe nose bleeds  Any excessive bleeding    - If you DO NOT have an appointment with your cardiologist within 2-4 weeks following your procedure, please contact their office.

## 2025-05-28 NOTE — Clinical Note
Catheter removed. Consent 2/Introductory Paragraph: Mohs surgery was explained to the patient and consent was obtained. The risks, benefits and alternatives to therapy were discussed in detail. Specifically, the risks of infection, scarring, bleeding, prolonged wound healing, incomplete removal, allergy to anesthesia, nerve injury and recurrence were addressed. Prior to the procedure, the treatment site was clearly identified and confirmed by the patient. All components of Universal Protocol/PAUSE Rule completed.

## 2025-05-28 NOTE — H&P
History Of Present Illness  Earline Villagran is a 69 y.o. female with PMHx significant for DM2, HTN, HLD, tachycardia,  former tobacco use, CVA, migraines, vertigo presenting with cp, GARCIA and severe coronary calcifications on CTA chest 3/2025 here for C. She was referred by Dr. Palumbo.     Past Medical History:  Medical History[1]     Past Surgical History:  Surgical History[2]       Social History:  Social History[3]    Family History:  Family History[4]     Allergies:  RX Allergies[5]     Home Medications:  Current Outpatient Medications   Medication Instructions    albuterol 90 mcg/actuation inhaler 2 puffs, inhalation, Every 6 hours PRN    amLODIPine (NORVASC) 5 mg, oral, Daily    aspirin 81 mg, Daily    blood sugar diagnostic (OneTouch Ultra Test) strip TEST GLUCOSE 2 TO 3 TIMES DAILY    carvedilol (COREG) 3.125 mg, oral, 2 times daily    estradiol (Estrace) 0.01 % (0.1 mg/gram) vaginal cream Apply 0.5g (blueberry size amount) nightly for 2 weeks, then 2 - 3 times per week    ezetimibe (ZETIA) 10 mg, oral, Daily    fluticasone (Flonase) 50 mcg/actuation nasal spray 2 sprays, Daily    gabapentin (NEURONTIN) 100 mg, oral, 3 times daily    glimepiride (AMARYL) 4 mg, oral    hydroCHLOROthiazide (HYDRODIURIL) 25 mg, oral, Daily    hydrOXYzine HCL (Atarax) 10 mg tablet TAKE 1 TABLET BY MOUTH EVERY 8  HOURS IF NEEDED FOR ITCHING    levothyroxine (SYNTHROID, LEVOXYL) 125 mcg, oral, Daily    meclizine (ANTIVERT) 25 mg, oral, 3 times daily PRN    Mounjaro 15 mg, subcutaneous, Weekly    nortriptyline (PAMELOR) 25 mg, oral, 2 times daily    omeprazole (PRILOSEC) 20 mg, oral, Daily    OneTouch Delica Plus Lancet 33 gauge misc USE WITH BLOOD GLUCOSE TEST ONCE DAILY    OneTouch Ultra2 Meter misc 1 DEVICE ONCE DAILY. TEST ONE TIME A DAY. INSULIN DEP? NO E11.9 DM 2    rosuvastatin (CRESTOR) 40 mg, oral, Daily    Ubrelvy 50 mg, oral, Daily PRN       Inpatient Medications:  Scheduled Medications[6]  PRN  "Medications[7]  Continuous Medications[8]      Review of Systems   Constitutional: Negative.    HENT: Negative.     Eyes: Negative.    Respiratory:  Positive for shortness of breath.    Cardiovascular:  Positive for chest pain.   Gastrointestinal: Negative.    Endocrine: Negative.    Genitourinary: Negative.    Musculoskeletal: Negative.    Skin: Negative.    Allergic/Immunologic: Negative.    Neurological:  Positive for dizziness and light-headedness.   Hematological: Negative.    Psychiatric/Behavioral: Negative.            Physical Exam  General:  Patient is awake, alert, and oriented.  Patient is in no acute distress.  HEENT:  Pupils equal and reactive.  Normocephalic.  Moist mucosa.    Neck:  Difficulty assessing 2/2 body habitus.   Cardiovascular:  Tachycardic, regular rate and rhythm.  Normal S1 and S2. No murmurs/rubs/gallops. Radial pulses 1+.   Pulmonary:  Clear to auscultation bilaterally.  Abdomen:  Soft. Non-tender.   Non-distended.  Positive bowel sounds.  Lower Extremities:  Pedal pulses 2+ No LE edema.  Neurologic:  Cranial nerves II-XII grossly intact.   No focal deficit.   Skin: Skin warm and dry, no lesions. Normal skin turgor.   Psychiatric: Normal affect.     Sedation Plan    ASA 3     Mallampati class: III.    Risks, benefits, and alternatives discussed with patient.         NPO since 0000    Last Recorded Vitals  Blood pressure 143/76, pulse (!) 113, temperature 36.4 °C (97.5 °F), temperature source Oral, resp. rate 18, height 1.7 m (5' 6.93\"), weight 103 kg (227 lb 1.2 oz), SpO2 98%.         Vitals from the Past 24 Hours  Heart Rate:  [113]   Temp:  [36.4 °C (97.5 °F)]   Resp:  [18]   BP: (143)/(76)   Height:  [170 cm (5' 6.93\")]   Weight:  [103 kg (227 lb 1.2 oz)]   SpO2:  [98 %]          Relevant Results    Labs    POCT Glucose:      POCT Urine Pregnancy:       CBC:   Recent Labs     03/31/25  0919 02/09/25  1202 09/12/24  0938 05/20/24  0508 05/19/24  1020 02/04/24  1333 12/03/22  0055 " "  WBC 10.8 12.2* 7.6 9.5 8.7 8.9 10.9   HGB 12.6 14.6 13.2 13.3 12.7 14.1 11.6*   HCT 38.0 43.3 39.9 39.6 35.4* 42.6 33.8*    347 228 249  --  281 212   MCV 91 88 90 86 84 90 88     BMP/CMP:   Recent Labs     03/31/25  0919 02/09/25  1202 09/12/24  0938 05/20/24  0508 05/19/24  1020 02/04/24  1657 02/04/24  1333 08/09/23  1045 12/03/22  0055   * 134* 136 136 130* 136 133*   < > 135*   K 4.0 3.9 3.9 4.0 4.0 3.6 5.3   < > 4.1   CL 98 96* 97* 96* 95* 101 98   < > 100   BUN 19 25* 15 15 20 23 23   < > 17   CREATININE 1.57* 1.44* 1.11* 1.12* 1.22* 1.66* 1.71*   < > 1.22*   CO2 25 23 31 27 22 27 20*   < > 25   CALCIUM 9.1 9.1 8.9 9.6 8.8 8.9 9.5   < > 8.7   PROT 7.1 7.9 7.4  --  7.3  --  7.9  --  7.3   BILITOT 0.4 0.6 0.4  --  0.4  --  0.5  --  0.3   ALKPHOS 74 73 72  --  79  --  62  --  57   ALT 14 23 17  --  14  --  14  --  12   AST 14 15 15  --  13  --  30  --  17   GLUCOSE 195* 172* 224* 170* 480* 169* 261*   < > 169*    < > = values in this interval not displayed.      Magnesium:   Recent Labs     03/31/25  0919 02/09/25  1202 05/20/24  0508 05/19/24  1020 12/03/22  0055   MG 1.71 1.81 1.83 1.61 1.59*     Lipid Panel:   Recent Labs     12/29/23  0922 08/09/23  1045 09/22/22  0921 06/04/22  1026 08/12/21  0935 11/13/20  0640   CHOL 222* 276* 165 199 217* 187   HDL 50.3 48.5 38.6* 39.9* 38.2* 37.8*   CHHDL 4.4 5.7* 4.3 5.0 5.7* 4.9   VLDL 32 30 24 30 27 21   TRIG 161* 148 122 152* 133 107   NHDL 172*  --   --   --   --   --      Cardiac       No lab exists for component: \"CK\", \"CKMBP\"   Hemoglobin A1C:   Recent Labs     05/07/25  1025 09/26/24  1512 05/19/24  1020 12/29/23  0922 08/09/23  1045 01/21/22  1411 12/16/21  1121 08/12/21  0935 11/13/20  0640   HGBA1C 7.8* 11.8* 10.6* 8.8* 8.1* 7.5* 8.0* 7.5 7.0     TSH/ Free T4:   Recent Labs     03/13/25  1245 05/19/24  1020 03/07/24  0711 12/29/23  0922 08/09/23  1045 11/29/22  1618 09/22/22  0921   TSH 10.55* 7.03* 8.99* 0.14* 35.33* 1.69 0.09*   FREET4 1.5 " "1.58* 1.16 1.53* 0.80  --   --      Iron:   Recent Labs     03/31/25  0919 02/09/25  1202 02/04/24  1333   BNP 19 9 12     Coag:     ABO: No results found for: \"ABO\"    Past Cardiology Tests (Last 3 Years):    EKG:  Recent Labs     04/04/25  0803 03/31/25  0914 02/09/25  1202   ATRRATE 112 123 120   VENTRATE 112 123 120   PRINT 160 164 158   QRSDUR 88 90 80   QTCFRED 401 398 400   QTCCALCB 444 449 449     Encounter Date: 04/04/25   ECG 12 lead (Clinic Performed)   Result Value    Ventricular Rate 112    Atrial Rate 112    KY Interval 160    QRS Duration 88    QT Interval 326    QTC Calculation(Bazett) 444    P Axis 62    R Axis 86    T Axis 57    QRS Count 18    Q Onset 219    P Onset 139    P Offset 200    T Offset 382    QTC Fredericia 401    Narrative    Sinus tachycardia  Otherwise normal ECG  When compared with ECG of 31-MAR-2025 08:25,  Criteria for Anterior infarct are no longer Present  Confirmed by Iron Lo ([id number]) on 4/14/2025 1:04:09 PM     Echo:  Echocardiogram:   Transthoracic Echo (TTE) Complete With Contrast 05/20/2024    [facility], [address] [place] [address]  Tel [phone] and Fax [phone]    TRANSTHORACIC ECHOCARDIOGRAM REPORT      Patient Name:      LULU SHAUN MONA Merlos Physician:    [id number] Rahul Taylor MD  Study Date:        5/20/2024            Ordering Provider:    [id number] RENUKA COPELAND  MRN/PID:           [id number]             Fellow:  Accession#:        [id number]         Nurse:                Camelia Cary RN  Date of Birth/Age: 1955 / 68      Sonographer:          Jackie Goode RDCS  years  Gender:            F                    Additional Staff:     BHUPENDRA Ponce RDCS  Height:            167.64 cm            Admit Date:           5/19/2024  Weight:            108.86 kg            Admission Status:     Inpatient -  Priority discharge  BSA / BMI:         2.16 m2 / 38.74      Encounter#:           " [id number]  kg/m2  Department Location:  [facility] Non  Invasive  Blood Pressure: 125 /74 mmHg    Study Type:    TRANSTHORACIC ECHO (TTE) COMPLETE  Diagnosis/ICD: Chest pain, unspecified-R07.9  Indication:    Chest Pain  CPT Code:      Echo Complete w Full Doppler-[id number]    Patient History:  Pertinent History: HTN, HLD, Hypothyroidism, DM2, CKD3, Morbid Obesity.    Study Detail: The following Echo studies were performed: 2D, M-Mode, Doppler and  color flow. Technically challenging study due to patient lying in  supine position and body habitus. Definity used as a contrast  agent for endocardial border definition. Total contrast used for  this procedure was 1.0 mL via IV push.      PHYSICIAN INTERPRETATION:  Left Ventricle: The left ventricular systolic function is normal, with an estimated ejection fraction of 60-65%. There are no regional wall motion abnormalities. The left ventricular cavity size is normal. Spectral Doppler shows an impaired relaxation pattern of left ventricular diastolic filling. There are normal left ventricular filling pressures.  Left Atrium: The left atrium is normal in size.  Right Ventricle: The right ventricle is normal in size. There is normal right ventricular global systolic function. TAPSE= 20.3 mm.  Right Atrium: The right atrium is normal in size.  Aortic Valve: The aortic valve is trileaflet. There is mild aortic valve thickening. There is trivial aortic valve regurgitation. The peak instantaneous gradient of the aortic valve is 6.1 mmHg.  Mitral Valve: The mitral valve is mildly thickened. There is trace mitral valve regurgitation.  Tricuspid Valve: The tricuspid valve is structurally normal. There is trace tricuspid regurgitation. The right ventricular systolic pressure is unable to be estimated.  Pulmonic Valve: The pulmonic valve is not well visualized. The pulmonic valve regurgitation was not well visualized.  Pericardium: There is a trivial pericardial effusion.  Aorta:  "The aortic root is normal.  In comparison to the previous echocardiogram(s): There are no prior studies on this patient for comparison purposes.      CONCLUSIONS:  1. Left ventricular systolic function is normal with a 60-65% estimated ejection fraction.  2. Spectral Doppler shows an impaired relaxation pattern of left ventricular diastolic filling.  3. TAPSE= 20.3 mm.    QUANTITATIVE DATA SUMMARY:  2D MEASUREMENTS:  Normal Ranges:  Ao Root d: 3.10 cm (2.0-3.7cm)    LV SYSTOLIC FUNCTION BY 2D PLANIMETRY (MOD):  Normal Ranges:  EF-A4C View: 54.9 % (>=55%)  EF-A2C View: 67.9 %  EF-Biplane:  61.6 %    LV DIASTOLIC FUNCTION:  Normal Ranges:  MV Peak E:    0.49 m/s    (0.7-1.2 m/s)  MV Peak A:    0.77 m/s    (0.42-0.7 m/s)  E/A Ratio:    0.64        (1.0-2.2)  MV e'         0.08 m/s    (>8.0)  MV lateral e' 0.08 m/s  MV medial e'  0.08 m/s  MV A Dur:     119.00 msec  E/e' Ratio:   6.16        (<8.0)  a'            0.10 m/s  MV DT:        210 msec    (150-240 msec)    MITRAL VALVE:  Normal Ranges:  MV DT: 210 msec (150-240msec)    AORTIC VALVE:  Normal Ranges:  AoV Vmax:      1.23 m/s (<=1.7m/s)  AoV Peak P.1 mmHg (<20mmHg)  LVOT Max Delvis:  0.97 m/s (<=1.1m/s)  LVOT VTI:      20.00 cm  LVOT Diameter: 2.00 cm  (1.8-2.4cm)  AoV Area,Vmax: 2.49 cm2 (2.5-4.5cm2)      RIGHT VENTRICLE:  TAPSE: 20.3 mm  RV s'  0.08 m/s    PULMONIC VALVE:  Normal Ranges:  PV Max Delvis: 0.8 m/s  (0.6-0.9m/s)  PV Max P.5 mmHg      [id number] Rahul Taylor MD  Electronically signed on 2024 at 9:30:39 AM        ** Final **    Ejection Fractions:  No results found for: \"EF\"  Cath:  Coronary Angiography: No results found for this or any previous visit from the past 1800 days.    Right Heart Cath: No results found for this or any previous visit from the past 1800 days.    Stress Test:  Nuclear:No results found for this or any previous visit from the past 1800 days.    Metabolic Stress: No results found for this or any previous visit from the past " 1800 days.    Cardiac Imaging:  Cardiac Scoring: No results found for this or any previous visit from the past 1800 days.    Cardiac MRI: No results found for this or any previous visit from the past 1800 days.         Assessment/Plan  Assessment/Plan   Assessment & Plan  GARCIA (dyspnea on exertion)    Coronary atherosclerosis    -[facility] with Dr. Herrera today 25  -ASA 81 mg PO pre-procedure  -BMP, CBC pre-procedure       NP discussed with Dr. Herrera regarding plan of care/ discharge plan      I spent 30 minutes in the professional and overall care of this patient.      Claudia Pizarro, APRN-CNP         [1]   Past Medical History:  Diagnosis Date    Allergic     Arthritis     Cluster headache     Concussion with loss of consciousness of 30 minutes or less, subsequent encounter 2021    Concussion with loss of consciousness of 30 minutes or less, subsequent encounter    CTS (carpal tunnel syndrome) 3/2000    Diabetes mellitus (Multi) 2000    GERD (gastroesophageal reflux disease)     Herpes zoster 2024    Hypertension 2005    Migraine 21    Personal history of other diseases of the circulatory system     History of hypertension    Personal history of other specified conditions     History of seizure    Personal history of other specified conditions 2020    History of vertigo    Seizures (Multi)     Stroke (Multi) 1976   [2]   Past Surgical History:  Procedure Laterality Date    BREAST BIOPSY      HERNIA REPAIR  Baby    HYSTERECTOMY      OTHER SURGICAL HISTORY  2021    Subtotal thyroidectomy    THYROID SURGERY     [3]   Social History  Tobacco Use    Smoking status: Former     Current packs/day: 0.00     Average packs/day: 0.7 packs/day for 38.4 years (27.2 ttl pk-yrs)     Types: Cigarettes     Start date: 1976     Quit date: 10/19/1999     Years since quittin.6     Passive exposure: Never    Smokeless tobacco: Never    Tobacco comments:     Over 25 years ago  "  Vaping Use    Vaping status: Never Used   Substance Use Topics    Alcohol use: Not Currently     Comment: Once a month maybe    Drug use: Never   [4]   Family History  Problem Relation Name Age of Onset    Hypertension Father Dylan Ruelas     Stroke Father Dylan Ruelas     Cancer Maternal Grandmother Kim rowland     Breast cancer Cousin      Ovarian cancer Cousin      Glaucoma Neg Hx      Macular degeneration Neg Hx     [5]   Allergies  Allergen Reactions    Raspberry Shortness of breath    Ciprofloxacin Rash     Itching, Itching    Clindamycin Diarrhea     Other Reaction(s): GI Upset      diarrhea, diarrhea    Green Tea Swelling     Lip swells   W green tea and neto, Lip swells   W green tea and neto    Penicillins Syncope     Other Reaction(s): Other (See Comments), Syncope, Unknown      Other reaction(s): Other (See Comments) PASSED OUT, PASSED OUT      PASSED OUT, PASSED OUT      \"Passed out\"    Sulfamethoxazole-Trimethoprim Hives, Unknown and Rash     Other Reaction(s): Unknown    Phenylephrine-Guaifenesin Palpitations     Other Reaction(s): Other: See Comments      Other reaction(s): Other: See Comments Fast heart rate      Fast heart rate   [6]   Scheduled medications   Medication Dose Route Frequency    aspirin  81 mg oral Once   [7]   PRN medications   Medication    dextrose    dextrose    glucagon    glucagon   [8]   Continuous Medications   Medication Dose Last Rate    sodium chloride 0.9%  100 mL/hr       "

## 2025-05-29 ENCOUNTER — APPOINTMENT (OUTPATIENT)
Dept: ENDOCRINOLOGY | Facility: CLINIC | Age: 70
End: 2025-05-29
Payer: MEDICARE

## 2025-05-29 DIAGNOSIS — E11.9 DIABETES MELLITUS TYPE II, NON INSULIN DEPENDENT (MULTI): ICD-10-CM

## 2025-06-03 ENCOUNTER — TREATMENT (OUTPATIENT)
Dept: PHYSICAL THERAPY | Facility: CLINIC | Age: 70
End: 2025-06-03
Payer: MEDICARE

## 2025-06-03 DIAGNOSIS — H51.11 CONVERGENCE INSUFFICIENCY: ICD-10-CM

## 2025-06-03 DIAGNOSIS — R42 VERTIGO: Primary | ICD-10-CM

## 2025-06-03 DIAGNOSIS — R51.9 HEADACHE: ICD-10-CM

## 2025-06-03 DIAGNOSIS — Z74.09 IMPAIRED MOBILITY: ICD-10-CM

## 2025-06-03 PROCEDURE — 95992 CANALITH REPOSITIONING PROC: CPT | Mod: GP

## 2025-06-03 PROCEDURE — 97530 THERAPEUTIC ACTIVITIES: CPT | Mod: GP

## 2025-06-03 PROCEDURE — 97112 NEUROMUSCULAR REEDUCATION: CPT | Mod: GP

## 2025-06-03 ASSESSMENT — PAIN SCALES - GENERAL: PAINLEVEL_OUTOF10: 0 - NO PAIN

## 2025-06-03 ASSESSMENT — PAIN - FUNCTIONAL ASSESSMENT: PAIN_FUNCTIONAL_ASSESSMENT: 0-10

## 2025-06-03 NOTE — PROGRESS NOTES
Physical Therapy    Physical Therapy Treatment    Patient Name: Earline Villagran  MRN: [id number]  Today's Date: 6/3/2025  Time Calculation  Start Time: 1105  Stop Time: 1147  Time Calculation (min): 42 min     PT Therapeutic Procedures Time Entry  Neuromuscular Re-Education Time Entry: 18  Therapeutic Activity Time Entry: 10  PT Modalities Time Entry  Canalith Repositioning Time Entry: 10    Visit number: 7  Insurance: Payor: [facility] MEDICARE / Plan: [employer] MEDICARE / Product Type: *No Product type* /   Plan of Care: 7/16/25  Authorization: 16 Visits  04/17/2025-07/10/2025  Primary Diagnosis: Vertigo      Assessment:  Semont for L posterior canal was performed this date to address patient dizziness as she was with 2/10 dizziness and L upbeat torsion nystagmus on L sidelying test. Initiated corner balance exercises to improve static balance. Patient would continue to benefit from skilled PT to continue to improve strength, balance, gait, and overall functional mobility.    Plan: Continue per plan of care to improve functional strength and balance. Re-check canals - sidelying test for posterior canal       Current Problem  Problem List Items Addressed This Visit    None  Visit Diagnoses         Codes      Vertigo    -  Primary R42      Headache     R51.9      Convergence insufficiency     H51.11      Impaired mobility     Z74.09          Subjective    Patient states that she has not had dizziness episodes in last week and has been able ot sleep on both R and L side. She had not taken Meclizine in 2 or so weeks.     Precautions:   Precautions  STEADI Fall Risk Score (The score of 4 or more indicates an increased risk of falling): 2  Precautions Comment: Mild Fall Risk    Pain  Pain Assessment  Pain Assessment: 0-10  0-10 (Numeric) Pain Score: 0 - No pain    Objective     POSITIONAL TESTING:  POSITION NYSTAGMUS DIRECTION DIZZINESS   Sidelying Right Ageotropic 1/10   Sidelying Left Left Upbeat  Torsion 2/10   Comments:     Treatments:  Therapeutic Activity x 10 minutes  Positional Testing for assessment of BBPV  Precautions sleep propped up 20 degrees, sleep on right side or back, avoid bending and head movement for the next 24 hours    Canalith Repositioning x 12 minutes  Semont for L posterior canal x 1  *Pt required moderate encouragement and emotional support due to her emotional overlay response to positional testing and canalith repositioning treatment    Neuromuscular Re-Education x 18 minutes  Seated Horizontal Smooth Pursuit   Seated Vertical Smooth Pursuit   Seated Horizontal Saccades   Seated Vertical Saccades   Corner balance NBOS EC  Corner balance Tandem  Corner balance cup taps    OP EDUCATION: Instructed patient via verbal cues, tactile cues, and demonstration for proper form and technique of exercises.  Educated patient regarding purpose of all activities.     HOME EXERCISE PROGRAM:  Access Code: [email or web address]  URL: [email or web address]  Date: 04/17/2025  Prepared by: Keaton Fajardo    Program Notes  DO RIGHT EYE AND THEN LEFT EYE. ONE EYE AT A TIME.    Exercises  - Seated Horizontal Smooth Pursuit  - 1 x daily - 7 x weekly - 1 sets - 10 reps  - Seated Vertical Smooth Pursuit  - 1 x daily - 7 x weekly - 1 sets - 10 reps  - Seated Horizontal Saccades  - 1 x daily - 7 x weekly - 1 sets - 10 reps  - Seated Vertical Saccades  - 1 x daily - 7 x weekly - 1 sets - 10 reps  - Seated Upper Trapezius Stretch  - 1 x daily - 7 x weekly - 3 sets - 30 sec  hold  - Seated Levator Scapulae Stretch  - 1 x daily - 7 x weekly - 3 sets - 30 sec hold    Goals:  Active       PT Problem       Patient will report 2/10 dizziness at lowest and 4/10 dizziness at worst to demonstrate improvement in dizziness symptoms and allow for return to PLOF       Start:  04/17/25    Expected End:  07/16/25            Patient will improve DHI score by 10 pts to demonstrate improvement in dizziness symptoms and  [de-identified] : EKG done at [facility] reviewed by me on 5/7/2025 showed normal sinus rhythm with left ventricular hypertrophy by index of Douglas and Zoraida.   functional mobility       Start:  04/17/25    Expected End:  07/16/25            Patient will improve cervical side-bend AROM bilaterally to min limitation and cervical rotation to nil limitation to allow for unlimited ability to perform driving, chores, and reading       Start:  04/17/25    Expected End:  07/16/25            Patient will improve Post Concussive Symptom Severity Scale (PCSS) to 50 points to demonstrate decreased concussive symptoms allow for return to PLOF        Start:  04/17/25    Expected End:  07/16/25            Patient will be Independent with home exercise program to demonstrate compliance and self-management       Start:  04/17/25    Expected End:  07/16/25            Patient Stated Goal - improve dizziness       Start:  04/17/25    Expected End:  07/16/25

## 2025-06-04 ENCOUNTER — APPOINTMENT (OUTPATIENT)
Dept: CARDIOLOGY | Facility: CLINIC | Age: 70
End: 2025-06-04
Payer: MEDICARE

## 2025-06-04 VITALS
HEIGHT: 66 IN | WEIGHT: 226.38 LBS | HEART RATE: 114 BPM | OXYGEN SATURATION: 97 % | SYSTOLIC BLOOD PRESSURE: 116 MMHG | DIASTOLIC BLOOD PRESSURE: 78 MMHG | BODY MASS INDEX: 36.38 KG/M2

## 2025-06-04 DIAGNOSIS — J44.9 CHRONIC OBSTRUCTIVE PULMONARY DISEASE, UNSPECIFIED COPD TYPE (MULTI): ICD-10-CM

## 2025-06-04 DIAGNOSIS — I25.10 ATHEROSCLEROSIS OF CORONARY ARTERY, UNSPECIFIED VESSEL OR LESION TYPE, UNSPECIFIED WHETHER ANGINA PRESENT, UNSPECIFIED WHETHER NATIVE OR TRANSPLANTED HEART: Primary | ICD-10-CM

## 2025-06-04 PROCEDURE — 99214 OFFICE O/P EST MOD 30 MIN: CPT | Performed by: INTERNAL MEDICINE

## 2025-06-04 PROCEDURE — 1160F RVW MEDS BY RX/DR IN RCRD: CPT | Performed by: INTERNAL MEDICINE

## 2025-06-04 PROCEDURE — 1126F AMNT PAIN NOTED NONE PRSNT: CPT | Performed by: INTERNAL MEDICINE

## 2025-06-04 PROCEDURE — 93010 ELECTROCARDIOGRAM REPORT: CPT | Performed by: INTERNAL MEDICINE

## 2025-06-04 PROCEDURE — 3008F BODY MASS INDEX DOCD: CPT | Performed by: INTERNAL MEDICINE

## 2025-06-04 PROCEDURE — 1159F MED LIST DOCD IN RCRD: CPT | Performed by: INTERNAL MEDICINE

## 2025-06-04 PROCEDURE — 3074F SYST BP LT 130 MM HG: CPT | Performed by: INTERNAL MEDICINE

## 2025-06-04 PROCEDURE — 99212 OFFICE O/P EST SF 10 MIN: CPT | Mod: 25 | Performed by: INTERNAL MEDICINE

## 2025-06-04 PROCEDURE — 93005 ELECTROCARDIOGRAM TRACING: CPT | Performed by: INTERNAL MEDICINE

## 2025-06-04 PROCEDURE — 3078F DIAST BP <80 MM HG: CPT | Performed by: INTERNAL MEDICINE

## 2025-06-04 PROCEDURE — 1036F TOBACCO NON-USER: CPT | Performed by: INTERNAL MEDICINE

## 2025-06-04 ASSESSMENT — COLUMBIA-SUICIDE SEVERITY RATING SCALE - C-SSRS
2. HAVE YOU ACTUALLY HAD ANY THOUGHTS OF KILLING YOURSELF?: NO
1. IN THE PAST MONTH, HAVE YOU WISHED YOU WERE DEAD OR WISHED YOU COULD GO TO SLEEP AND NOT WAKE UP?: NO
6. HAVE YOU EVER DONE ANYTHING, STARTED TO DO ANYTHING, OR PREPARED TO DO ANYTHING TO END YOUR LIFE?: NO

## 2025-06-04 ASSESSMENT — ENCOUNTER SYMPTOMS
DEPRESSION: 0
LOSS OF SENSATION IN FEET: 0
OCCASIONAL FEELINGS OF UNSTEADINESS: 0

## 2025-06-04 ASSESSMENT — PAIN SCALES - GENERAL: PAINLEVEL_OUTOF10: 0-NO PAIN

## 2025-06-04 NOTE — PATIENT INSTRUCTIONS
Thank you for coming to today's cardiology visit.  Your coronary angiogram showed high grade blockage involving the right coronary artery the other arteries showed only mild disease.  I think we can manage this medically that would include good control of your blood sugar blood pressure and cholesterol.  Please get a follow-up cholesterol check an order is already in the system in a couple of weeks as a fasting measurement.  Please continue to work with Dr. Calderon to get good control of your blood sugar I would ideally like to see your A1c around 7 or below

## 2025-06-04 NOTE — PROGRESS NOTES
Primary Care Physician: Patricia Calderon MD  Date of Visit: 06/04/2025  8:20 AM EDT  Location of visit: [facility] [place] [address]     Chief Complaint:   Chief Complaint   Patient presents with    Follow-up    Hyperlipidemia    Hypertension   Chief complaint shortness of breath.  Follow-up left heart cath after heavy coronary calcium noted on CT PE.  Recent labs and clinical course reviewed     HPI / Summary:   Earline Villagran is a 69 y.o. female presents for new cardiovascular evaluation. Dawn Tsang MD   Found on cath to have high-grade codominant RCA lesion felt best managed medically PCI was an option she is in follow-up to discuss treatment strategy at this time  Regardless improved denies any chest discomfort.  Is anxious to start exercising  Specialty Problems          Cardiology Problems    Hyperlipidemia    Formatting of this note might be different from the original.   HYPERLIPIDEMIA NEC/NOS  Formatting of this note might be different from the original.   Overview:    HYPERLIPIDEMIA NEC/NOS  Last Assessment & Plan:    Formatting of this note might be different from the original.   Assessment: stable on Lipitor         Benign essential hypertension    Atherosclerosis of native artery of both lower extremities, with unspecified presence of clinical manifestation    Tachycardia    Coronary atherosclerosis    GARCIA (dyspnea on exertion)        Medical History[1]       Surgical History[2]       Social History:   reports that she quit smoking about 25 years ago. Her smoking use included cigarettes. She started smoking about 49 years ago. She has a 27.2 pack-year smoking history. She has never been exposed to tobacco smoke. She has never used smokeless tobacco. She reports that she does not currently use alcohol. She reports that she does not use drugs.      Allergies:  RX Allergies[3]    Outpatient Medications:  Current Outpatient Medications   Medication Instructions    albuterol 90 mcg/actuation inhaler 2 puffs,  "inhalation, Every 6 hours PRN    amLODIPine (NORVASC) 5 mg, oral, Daily    aspirin 81 mg, Daily    blood sugar diagnostic (OneTouch Ultra Test) strip TEST GLUCOSE 2 TO 3 TIMES DAILY    carvedilol (COREG) 3.125 mg, oral, 2 times daily    ezetimibe (ZETIA) 10 mg, oral, Daily    gabapentin (NEURONTIN) 100 mg, oral, 3 times daily    glimepiride (AMARYL) 4 mg, oral    hydroCHLOROthiazide (HYDRODIURIL) 25 mg, oral, Daily    hydrOXYzine HCL (Atarax) 10 mg tablet TAKE 1 TABLET BY MOUTH EVERY 8  HOURS IF NEEDED FOR ITCHING    levothyroxine (SYNTHROID, LEVOXYL) 125 mcg, oral, Daily    meclizine (ANTIVERT) 25 mg, oral, 3 times daily PRN    Mounjaro 15 mg, subcutaneous, Weekly    nortriptyline (PAMELOR) 25 mg, oral, 2 times daily    omeprazole (PRILOSEC) 20 mg, oral, Daily    OneTouch Delica Plus Lancet 33 gauge misc USE WITH BLOOD GLUCOSE TEST ONCE DAILY    OneTouch Ultra2 Meter misc 1 DEVICE ONCE DAILY. TEST ONE TIME A DAY. INSULIN DEP? NO E11.9 DM 2    rosuvastatin (CRESTOR) 40 mg, oral, Daily    Ubrelvy 50 mg, oral, Daily PRN       ROS     Physical Exam:  Vitals:    06/04/25 0824   BP: 116/78   BP Location: Right arm   Patient Position: Sitting   BP Cuff Size: Large adult   Pulse: (!) 114   SpO2: 97%   Weight: 103 kg (226 lb 6 oz)   Height: 1.676 m (5' 6\")     Wt Readings from Last 5 Encounters:   06/04/25 103 kg (226 lb 6 oz)   05/28/25 103 kg (227 lb 1.2 oz)   05/23/25 103 kg (228 lb)   05/09/25 104 kg (230 lb)   05/07/25 104 kg (230 lb)     Body mass index is 36.54 kg/m².   Physical Exam   Well-appearing female in no acute distress.  Flat JVP.  Regular rhythm with high heart rate.  Clear lungs.  Soft abdomen.  No dependent edema with intact pedal pulses  Last Labs:  CMP:  Recent Labs     05/28/25  0718 03/31/25  0919 02/09/25  1202 09/12/24  0938 05/20/24  0508    135* 134* 136 136   K 4.0 4.0 3.9 3.9 4.0    98 96* 97* 96*   CO2 24 25 23 31 27   ANIONGAP 15 16 19 12 17   BUN 16 19 25* 15 15   CREATININE 1.16* " 1.57* 1.44* 1.11* 1.12*   EGFR 51* 35* 39* 54* 53*   GLUCOSE 193* 195* 172* 224* 170*     Recent Labs     03/31/25  0919 02/09/25  1202 09/12/24  0938 05/20/24  0508 05/19/24  1020 02/04/24  1333   ALBUMIN 4.1 4.0 3.7 4.2 3.8 4.0   ALKPHOS 74 73 72  --  79 62   ALT 14 23 17  --  14 14   AST 14 15 15  --  13 30   BILITOT 0.4 0.6 0.4  --  0.4 0.5     CBC:  Recent Labs     05/28/25  0718 03/31/25  0919 02/09/25  1202 09/12/24  0938 05/20/24  0508   WBC 9.3 10.8 12.2* 7.6 9.5   HGB 12.2 12.6 14.6 13.2 13.3   HCT 35.4* 38.0 43.3 39.9 39.6    291 347 228 249   MCV 89 91 88 90 86     COAG:   Recent Labs     03/31/25 0919   DDIMERVTE 1,386*     HEME/ENDO:  Recent Labs     05/07/25  1025 03/13/25  1245 09/26/24  1512 05/19/24  1020 03/07/24  0711 12/29/23  0922   TSH  --  10.55*  --  7.03* 8.99* 0.14*   HGBA1C 7.8*  --  11.8* 10.6*  --  8.8*      CARDIAC:   Recent Labs     03/31/25  0919 02/09/25  1300 02/09/25  1202 05/19/24  2216 05/19/24  1020 02/04/24  1458 02/04/24  1333   TROPHS 4 <3 <3 6 6   < > <3   BNP 19  --  9  --   --   --  12    < > = values in this interval not displayed.     Recent Labs     12/29/23  0922 08/09/23  1045 09/22/22  0921 06/04/22  1026   CHOL 222* 276* 165 199   LDLF  --  198* 102* 129*   HDL 50.3 48.5 38.6* 39.9*   TRIG 161* 148 122 152*       Last Cardiology Tests:  ECG:  Sinus tachycardia otherwise no acute ST or T wave changes heart rate 114 bpm    Echo:  Echo Results:  Transthoracic Echo (TTE) Complete With Contrast 05/20/2024    [facility], [address] [place] [address]  Tel [phone] and Fax [phone]    TRANSTHORACIC ECHOCARDIOGRAM REPORT      Patient Name:      LULU DUNN   Reading Physician:    [id number] Rahul Taylor MD  Study Date:        5/20/2024            Ordering Provider:    [id number] RENUKA COPELAND  MRN/PID:           [id number]             Fellow:  Accession#:        [id number]         Nurse:                Camelia  Luis Daniel ADLER  Date of Birth/Age: 1955 / 68      Sonographer:          Jackie Goode RDCS  years  Gender:            F                    Additional Staff:     BHUPENDRA Ponce RDCS  Height:            167.64 cm            Admit Date:           5/19/2024  Weight:            108.86 kg            Admission Status:     Inpatient -  Priority discharge  BSA / BMI:         2.16 m2 / 38.74      Encounter#:           [id number]  kg/m2  Department Location:  [facility] Non  Invasive  Blood Pressure: 125 /74 mmHg    Study Type:    TRANSTHORACIC ECHO (TTE) COMPLETE  Diagnosis/ICD: Chest pain, unspecified-R07.9  Indication:    Chest Pain  CPT Code:      Echo Complete w Full Doppler-56142    Patient History:  Pertinent History: HTN, HLD, Hypothyroidism, DM2, CKD3, Morbid Obesity.    Study Detail: The following Echo studies were performed: 2D, M-Mode, Doppler and  color flow. Technically challenging study due to patient lying in  supine position and body habitus. Definity used as a contrast  agent for endocardial border definition. Total contrast used for  this procedure was 1.0 mL via IV push.      PHYSICIAN INTERPRETATION:  Left Ventricle: The left ventricular systolic function is normal, with an estimated ejection fraction of 60-65%. There are no regional wall motion abnormalities. The left ventricular cavity size is normal. Spectral Doppler shows an impaired relaxation pattern of left ventricular diastolic filling. There are normal left ventricular filling pressures.  Left Atrium: The left atrium is normal in size.  Right Ventricle: The right ventricle is normal in size. There is normal right ventricular global systolic function. TAPSE= 20.3 mm.  Right Atrium: The right atrium is normal in size.  Aortic Valve: The aortic valve is trileaflet. There is mild aortic valve thickening. There is trivial aortic valve regurgitation. The peak instantaneous gradient of the aortic valve is 6.1 mmHg.  Mitral Valve: The mitral  valve is mildly thickened. There is trace mitral valve regurgitation.  Tricuspid Valve: The tricuspid valve is structurally normal. There is trace tricuspid regurgitation. The right ventricular systolic pressure is unable to be estimated.  Pulmonic Valve: The pulmonic valve is not well visualized. The pulmonic valve regurgitation was not well visualized.  Pericardium: There is a trivial pericardial effusion.  Aorta: The aortic root is normal.  In comparison to the previous echocardiogram(s): There are no prior studies on this patient for comparison purposes.      CONCLUSIONS:  1. Left ventricular systolic function is normal with a 60-65% estimated ejection fraction.  2. Spectral Doppler shows an impaired relaxation pattern of left ventricular diastolic filling.  3. TAPSE= 20.3 mm.    QUANTITATIVE DATA SUMMARY:  2D MEASUREMENTS:  Normal Ranges:  Ao Root d: 3.10 cm (2.0-3.7cm)    LV SYSTOLIC FUNCTION BY 2D PLANIMETRY (MOD):  Normal Ranges:  EF-A4C View: 54.9 % (>=55%)  EF-A2C View: 67.9 %  EF-Biplane:  61.6 %    LV DIASTOLIC FUNCTION:  Normal Ranges:  MV Peak E:    0.49 m/s    (0.7-1.2 m/s)  MV Peak A:    0.77 m/s    (0.42-0.7 m/s)  E/A Ratio:    0.64        (1.0-2.2)  MV e'         0.08 m/s    (>8.0)  MV lateral e' 0.08 m/s  MV medial e'  0.08 m/s  MV A Dur:     119.00 msec  E/e' Ratio:   6.16        (<8.0)  a'            0.10 m/s  MV DT:        210 msec    (150-240 msec)    MITRAL VALVE:  Normal Ranges:  MV DT: 210 msec (150-240msec)    AORTIC VALVE:  Normal Ranges:  AoV Vmax:      1.23 m/s (<=1.7m/s)  AoV Peak P.1 mmHg (<20mmHg)  LVOT Max Delvis:  0.97 m/s (<=1.1m/s)  LVOT VTI:      20.00 cm  LVOT Diameter: 2.00 cm  (1.8-2.4cm)  AoV Area,Vmax: 2.49 cm2 (2.5-4.5cm2)      RIGHT VENTRICLE:  TAPSE: 20.3 mm  RV s'  0.08 m/s    PULMONIC VALVE:  Normal Ranges:  PV Max Delvis: 0.8 m/s  (0.6-0.9m/s)  PV Max P.5 mmHg      [id number] Rahul Taylor MD  Electronically signed on 2024 at 9:30:39 AM        ** Final **        Cath:      Stress Test:  Stress Results:  Regadenoson Stress Test With Myocardial Perfusion Spect (Single Study) 04/25/2025    Narrative  Interpreted By:  Keith Mehta and Elsamaloty Mazzin  STUDY:  NUCLEAR STRESS TEST; 4/25/2025 10:02 am    INDICATION:  Signs/Symptoms:Shortness of breath on exertion.    ,R06.09 Other forms of dyspnea    COMPARISON:  None.    ACCESSION NUMBER(S):  [id number]    ORDERING CLINICIAN:  MARIA ALEJANDRA ARRIAGA    TECHNIQUE:  DIVISION OF NUCLEAR MEDICINE  STRESS MYOCARDIAL PERFUSION SCAN, ONE DAY PROTOCOL    The patient received an intravenous dose of  10.3 mCi of Tc-99m  Myoview and resting emission tomographic (SPECT) images of the  myocardium were acquired. The patient then received an intravenous  infusion of 0.4mg regadenoson (Lexiscan)  followed by an additional  dose of  30 mCi of Tc-99m  Myoview. Stress phase SPECT images of the  myocardium were then acquired. These included ECG-gated images to  assess and quantify ventricular function.    CT attenuation correction images were not obtained.    Limiting Factors: None    FINDINGS:  Quality of the Study: Good    Techincal Limitations/Significant Artifacts: None    Cardiac Imaging: Images show no definite fixed or reversible  perfusion defects throughout the visualized cardiac walls.    Attenuated Corrected Images: N/a    Left ventricular size (EDV): 61 mL (normal less than 120 mL)    Gated images: Normal wall motion and thickening. Post-stress left  ventricular ejection fraction estimated at 56% (normal 45% or greater)    Other Significant Findings:None    Impression  1. Negative myocardial perfusion study without evidence of inducible  myocardial ischemia or prior infarction.  2. The left ventricle is normal in size.  3. Normal LV wall motion with a post-stress LV EF estimated at 56%.    I personally reviewed the images/study and I agree with the findings  as stated by Carrie Edward MD (resident).    MACRO:  None      Signed by:  Keith Mehta 4/25/2025 10:43 AM  Dictation workstation:   [id number]         Cardiac Imaging:  Cardiac Catheterization Procedure     [facility], Cath Lab, [address] [place] [address]    Cardiovascular Catheterization Report    Patient Name:     LULU DUNN   Performing Physician:  Ashely Herrera MD  Study Date:       5/28/2025            Verifying Physician:   Ashely Herrera MD  MRN/PID:          [id number]             Cardiologist/Co-Scrub:  Accession#:       [id number]         Ordering Provider:     Ashely HERRERA  Date of           1955 / 69      Cardiologist:  Birth/Age:        years  Gender:           F                    Fellow:  Encounter#:       [id number]           Surgeon:       Study: Left Heart Cath       Indications:  LULU DUNN is a 70 year old female who presents with diabetes, obesity, hypertension and GARCIA. GARCIA.     Procedure Description:  After infiltration with 2% Lidocaine, the right radial artery was cannulated with a modified Seldinger technique. Subsequently a 6 English sheath was placed in the right radial artery. Selective coronary catheterization was performed using a 5 Fr catheter(s) exchanged over a guide wire to cannulate the coronary arteries. A JL 3.5 tip catheter was used for left coronary injections. A JR 4 tip catheter was used for right coronary injections.  Multiple injections of contrast were made into the left and right coronary arteries with angiograms recorded in multiple projections. After completion of the procedure, the arterial sheath was pulled and a TR Band Radial Compression Device was utilized to obtain patent hemostasis.     Coronary Angiography:  The coronary circulation is co-dominant.     Left Main Coronary Artery:  The left main coronary artery is no LM exists [LAD and LCX have seperate  ostia] caliber vessel.     Left Anterior Descending Coronary Artery Distribution:  The left anterior descending coronary artery is a normal caliber vessel. The LAD arises normally from the left main coronary artery. The LAD demonstrated no significant disease or stenosis greater than 30%. The 1st diagonal branch showed no significant disease or stenosis greater than 30%. The proximal to mid 1st diagonal branch revealed 40%. The 2nd diagonal branch demonstrated no significant disease or stenosis greater than 30%.     Circumflex Coronary Artery Distribution:  The circumflex coronary artery is a normal caliber vessel. The circumflex arises normally from the left main coronary artery and terminates in the AV groove. The circumflex revealed no significant disease or stenosis greater than 30%. The 1st obtuse marginal branch showed no significant disease or stenosis greater than 30%. The mid 1st obtuse marginal branch showed upper ramification of OM1 has 70% stenosis. The left posterolateral branch showed no significant disease or stenosis greater than 30%. The left posterior descending artery showed no significant disease or stenosis greater than 30%.     Right Coronary Artery Distribution:    The right coronary artery is a normal caliber vessel. The RCA arises normally from the right sinus of Valsalva. The proximal to mid right coronary artery showed 85%. An additional RCA lesion, located at the mid right coronary artery, revealed 70% stenosis.  The right posterolateral branch is a small caliber vessel. The right posterolateral branch showed no significant disease or stenosis greater than 30%. The right posterior descending artery is a small caliber vessel. The right posterior descending artery showed no significant disease or stenosis greater than 30%.     Coronary Lesion Summary:  Vessel                        Stenosis                  Vessel Segment  1st Diagonal                    40%                     proximal to  mid  OM 1         upper ramification of OM1 has 70% stenosis       mid  RCA                             85%                     proximal to mid  RCA                         70% stenosis                      mid       Hemo Personnel:  +----------------+---------+  Name            Duty       +----------------+---------+  Carrie Herrera MD 1  +----------------+---------+       Hemodynamic Pressures:     +----+-------------------+---------+------------+-------------+------+---------+  Site     Date Time       Phase    Systolic    Diastolic    ED  Mean mmHg                           Name       mmHg        mmHg      mmHg            +----+-------------------+---------+------------+-------------+------+---------+    AO  5/28/2025 8:11:37 AIR REST         130           90            109                       AM                                                   +----+-------------------+---------+------------+-------------+------+---------+    LV  5/28/2025 8:21:13 AIR REST         135            2     5                                AM                                                   +----+-------------------+---------+------------+-------------+------+---------+    LV  5/28/2025 8:21:23 AIR REST         127            1    14                                AM                                                   +----+-------------------+---------+------------+-------------+------+---------+    LV  5/28/2025 8:21:53 AIR REST         128            3     6                                AM                                                   +----+-------------------+---------+------------+-------------+------+---------+    LV  5/28/2025 8:22:11 AIR REST         137            2    14                                AM                                                    +----+-------------------+---------+------------+-------------+------+---------+   LVp  5/28/2025 8:22:17 AIR REST         137            0    14                                AM                                                   +----+-------------------+---------+------------+-------------+------+---------+   AOp  5/28/2025 8:22:24 AIR REST         128           73             98                       AM                                                   +----+-------------------+---------+------------+-------------+------+---------+       Complications:  No in-lab complications observed.     Cardiac Cath Post Procedure Notes:  Post Procedure Diagnosis: See below.  Blood Loss:               Estimated blood loss during the procedure was 5 mls.  Specimens Removed:        Number of specimen(s) removed: none.    ____________________________________________________________________________________  CONCLUSIONS:   1. Severe single main vessel disease [85% proximal_mid RCA and 70% mid RCA] and moderate to severe branch vessel disease [70% stenosis of the upper ramification branch of OM1] in a co-dominant system.   2. LVEDP was normal at 12 mmHg.   3. Patient with exertional dyspnea exertion and recent negative chemical nuclear stress test, she is also morbidly obese and likely deconditioned [resting heart rate in the 110s]. Will treat the RCA [co-dominant vessel] disease medically, if she fails then may consider RCA PCI.   4. Further management as per referring cardiologist, Dr. Palumbo.    ICD 10 Codes:  Other forms of dyspnea-R06.09     CPT Codes:  Left Heart Cath (visualization of coronaries) and LV-92709; Ultrasound guidance for vascular access-40105     [id number] Carrie Herrera MD  Performing Physician  Electronically signed by [id number] Carrie Herrera MD on 5/28/2025 at 9:09:09 AM         ** Final **        Assessment/Plan     77-year-old with class II obesity, on Mounjaro, DM2 with suboptimal  control, HTN, DL, CAD with high-grade codominant RCA disease.  Recommend medical management.  Remains on high intensity statin should have a follow-up lipid profile in about 2 months fasting continues on GLP-1.  Will increase level of fitness.  Will see her back in 6 months sooner if clinical symptoms warrant      Orders:  No orders of the defined types were placed in this encounter.     Followup Appts:  Future Appointments   Date Time Provider Department Center   6/10/2025  2:20 PM Theresa Castañeda, PharmD [id number] [facility]   6/12/2025  7:15 AM Keaton Fajardo, PT [id number] [facility]   7/22/2025  9:30 AM Ashlie Askew, APRN-CNP [id number] [facility]   7/24/2025  3:45 PM Misty Harper MD [id number] [facility]   8/1/2025  9:40 AM Manuel Hansen DO [id number] [facility]   9/29/2025  2:30 PM Patricia Calderon MD [id number] [facility]   3/11/2026  9:15 AM [id number] MAMMO 1 [id number] [facility]   3/13/2026  9:00 AM Pepper Webb MD [id number] [facility]   3/23/2026  8:15 AM Margaux Danielson, OD [id number] [facility]           ____________________________________________________________  Aram Palumbo MD    Senior Attending Physician  [facility]  [facility]       [1]   Past Medical History:  Diagnosis Date    Allergic     Arthritis     Cluster headache     Concussion with loss of consciousness of 30 minutes or less, subsequent encounter 11/29/2021    Concussion with loss of consciousness of 30 minutes or less, subsequent encounter    CTS (carpal tunnel syndrome) 3/2000    Diabetes mellitus (Multi) 9/2000    GERD (gastroesophageal reflux disease)     Herpes zoster 05/19/2024    Hypertension 4/2005    Migraine 2/14/22    Personal history of other diseases of the circulatory system     History of hypertension    Personal history of other specified conditions     History of seizure    Personal history of other specified conditions 12/12/2020    History of vertigo    Seizures  "(Multi)     Stroke (Multi) 8/1976   [2]   Past Surgical History:  Procedure Laterality Date    BREAST BIOPSY  2000    CARDIAC CATHETERIZATION N/A 5/28/2025    Procedure: LHC, With LV;  Surgeon: Carrie Herrera MD;  Location: [facility] Cardiac Cath Lab;  Service: Cardiovascular;  Laterality: N/A;    HERNIA REPAIR  Baby    HYSTERECTOMY  Feb22    OTHER SURGICAL HISTORY  01/02/2021    Subtotal thyroidectomy    THYROID SURGERY     [3]   Allergies  Allergen Reactions    Raspberry Shortness of breath    Ciprofloxacin Rash     Itching, Itching    Clindamycin Diarrhea     Other Reaction(s): GI Upset      diarrhea, diarrhea    Green Tea Swelling     Lip swells   W green tea and neto, Lip swells   W green tea and neto    Penicillins Syncope     Other Reaction(s): Other (See Comments), Syncope, Unknown      Other reaction(s): Other (See Comments) PASSED OUT, PASSED OUT      PASSED OUT, PASSED OUT      \"Passed out\"    Sulfamethoxazole-Trimethoprim Hives, Unknown and Rash     Other Reaction(s): Unknown    Phenylephrine-Guaifenesin Palpitations     Other Reaction(s): Other: See Comments      Other reaction(s): Other: See Comments Fast heart rate      Fast heart rate     "

## 2025-06-06 LAB
ATRIAL RATE: 114 BPM
P AXIS: 72 DEGREES
P OFFSET: 195 MS
P ONSET: 142 MS
PR INTERVAL: 152 MS
Q ONSET: 218 MS
QRS COUNT: 19 BEATS
QRS DURATION: 88 MS
QT INTERVAL: 336 MS
QTC CALCULATION(BAZETT): 463 MS
QTC FREDERICIA: 416 MS
R AXIS: 86 DEGREES
T AXIS: 60 DEGREES
T OFFSET: 386 MS
VENTRICULAR RATE: 114 BPM

## 2025-06-10 ENCOUNTER — APPOINTMENT (OUTPATIENT)
Dept: PHARMACY | Facility: HOSPITAL | Age: 70
End: 2025-06-10
Payer: MEDICARE

## 2025-06-10 DIAGNOSIS — E11.65 TYPE 2 DIABETES MELLITUS WITH HYPERGLYCEMIA, WITH LONG-TERM CURRENT USE OF INSULIN: ICD-10-CM

## 2025-06-10 DIAGNOSIS — Z79.4 TYPE 2 DIABETES MELLITUS WITH HYPERGLYCEMIA, WITH LONG-TERM CURRENT USE OF INSULIN: ICD-10-CM

## 2025-06-10 NOTE — PROGRESS NOTES
Clinical Pharmacy Appointment    Patient ID: Earline Villagran is a 69 y.o. female who presents for Diabetes.    Pt is here for Follow Up appointment.     Referring Provider: Patricia Calderon MD  PCP: Patricia Calderon MD  Last visit with PCP: 3/13/2025   Next visit with PCP: 9/29/2025    Subjective   Drug Interactions  No relevant drug interactions were noted.    Medication System Management  Patient's preferred pharmacy: [id number] in [place]  Adherence/Organization: No concerns at this time  Affordability/Accessibility: No concerns at this time    HPI  DIABETES MELLITUS TYPE 2:    Diagnosed with diabetes:  20 years ago.   Known diabetic complications: hypeglycemia.  Does patient follow with Endocrinology: Yes  Last optometry exam: 3/19/2025    Current diabetic medications include:  Mounjaro 15 mg; inject 15 mg once weekly on Saturdays  Glimepiride 4 mg; take 1 tablet BID    Clarifications to above regimen: None  Adverse Effects: None    Past diabetic medications include:  Trulicity, Ozempic, Januvia, Glipizide, Metformin, Jardiance    Glucose Readings:  Glucometer/CGM Type: OneTouch Ultra Glucometer  Patient tests BG 2 times per day    Current home BG readings (mg/dL): 200s mg/dL  Previous home BG readings (mg/dL): Ave: 115 mg/dL    Any episodes of hypoglycemia? No, lowest in the upper 70s.  Did patient treat episode of hypoglycemia appropriately? N/A  Does the patient have a prescription for ready-to-use Glucagon? Not on insulin    Lifestyle:  Diet: 2 meals/day.   BK: Cereal, banana  LN: Salad, meat, shrimp  Drinks: Water, sparkling water  Exercise: patient wants to start walking more  Tobacco history: Former smoker    Secondary Prevention:  Statin? Yes  ACE-I/ARB? No  Aspirin? Yes    Pertinent PMH Review:  PMH of Pancreatitis: No  PMH of Retinopathy: No  PMH of Urinary Tract Infections: No  PMH of MTC: No  PMH of MEN2: No  UACR/EGFR in last year?: No  Albumin/Creatine Ratio   Date Value Ref  Range Status   06/04/2022 17.5 0.0 - 30.0 ug/mg crt Final     Immunizations:  Influenza? Yes  COVID? Yes  Pneumonia? Most recent is Prevnar 13 from 8/24/2021    Objective   Allergies[1]  Social History     Social History Narrative    Not on file      Medication Review  Current Outpatient Medications   Medication Instructions    albuterol 90 mcg/actuation inhaler 2 puffs, inhalation, Every 6 hours PRN    amLODIPine (NORVASC) 5 mg, oral, Daily    aspirin 81 mg, Daily    blood sugar diagnostic (OneTouch Ultra Test) strip TEST GLUCOSE 2 TO 3 TIMES DAILY    carvedilol (COREG) 3.125 mg, oral, 2 times daily    ezetimibe (ZETIA) 10 mg, oral, Daily    gabapentin (NEURONTIN) 100 mg, oral, 3 times daily    glimepiride (AMARYL) 4 mg, oral    hydroCHLOROthiazide (HYDRODIURIL) 25 mg, oral, Daily    hydrOXYzine HCL (Atarax) 10 mg tablet TAKE 1 TABLET BY MOUTH EVERY 8  HOURS IF NEEDED FOR ITCHING    levothyroxine (SYNTHROID, LEVOXYL) 125 mcg, oral, Daily    meclizine (ANTIVERT) 25 mg, oral, 3 times daily PRN    Mounjaro 15 mg, subcutaneous, Weekly    nortriptyline (PAMELOR) 25 mg, oral, 2 times daily    omeprazole (PRILOSEC) 20 mg, oral, Daily    OneTouch Delica Plus Lancet 33 gauge misc USE WITH BLOOD GLUCOSE TEST ONCE DAILY    OneTouch Ultra2 Meter misc 1 DEVICE ONCE DAILY. TEST ONE TIME A DAY. INSULIN DEP? NO E11.9 DM 2    rosuvastatin (CRESTOR) 40 mg, oral, Daily    Ubrelvy 50 mg, oral, Daily PRN      Vitals  BP Readings from Last 2 Encounters:   06/04/25 116/78   05/28/25 136/82     BMI Readings from Last 1 Encounters:   06/04/25 36.54 kg/m²      Labs  A1C  Lab Results   Component Value Date    HGBA1C 7.8 (H) 05/07/2025    HGBA1C 11.8 (A) 09/26/2024    HGBA1C 10.6 (H) 05/19/2024     BMP  Lab Results   Component Value Date    CALCIUM 9.2 05/28/2025     05/28/2025    K 4.0 05/28/2025    CO2 24 05/28/2025     05/28/2025    BUN 16 05/28/2025    CREATININE 1.16 (H) 05/28/2025    EGFR 51 (L) 05/28/2025     LFTs  Lab  Results   Component Value Date    ALT 14 03/31/2025    AST 14 03/31/2025    ALKPHOS 74 03/31/2025    BILITOT 0.4 03/31/2025     FLP  Lab Results   Component Value Date    TRIG 161 (H) 12/29/2023    CHOL 222 (H) 12/29/2023    LDLF 198 (H) 08/09/2023    LDLCALC 140 (H) 12/29/2023    HDL 50.3 12/29/2023     Urine Microalbumin  Lab Results   Component Value Date    MICROALBCREA 17.5 06/04/2022     Weight Management  Wt Readings from Last 3 Encounters:   06/04/25 103 kg (226 lb 6 oz)   05/28/25 103 kg (227 lb 1.2 oz)   05/23/25 103 kg (228 lb)      There is no height or weight on file to calculate BMI.     Assessment/Plan   Problem List Items Addressed This Visit       Type 2 diabetes mellitus with hyperglycemia, with long-term current use of insulin    Patient's goal A1c is < 7%.  Is pt at goal? No, patient's most recent A1c from 5/7/2025 was 7.8%  Patient's SMBGs are elevated over the last few weeks into the 200s.     Rationale for plan: At this time, patient wants to increase her activity and really watch the types of foods she eats to see if it will improve again. The only thing that patient can think of that changed recently is that she was switched to rosuvastatin from atorvastatin and ezetimibe was added by her cardiologist.    Medication Changes:  CONTINUE  Mounjaro 15 mg; inject 15 mg once weekly on Saturdays  Glimepiride 4 mg; take 1 tablet BID    Monitoring and Education:  Answered all patient questions and concerns.         Relevant Orders    Referral to Clinical Pharmacy         Clinical Pharmacist follow-up: 6/10/2025, Telehealth visit    Patient is not followed in [facility]. (If yes, track minutes under compass emma at each visit)    Continue all meds under the continuation of care with the referring provider and clinical pharmacy team.    Thank you,  Theresa Castañeda, PharmD  Clinical Pharmacist - Primary Care  [phone]  4/29/2025    Verbal consent to manage patient's drug therapy was obtained from the  "patient. They were informed they may decline to participate or withdraw from participation in pharmacy services at any time.         [1]   Allergies  Allergen Reactions    Raspberry Shortness of breath    Ciprofloxacin Rash     Itching, Itching    Clindamycin Diarrhea     Other Reaction(s): GI Upset      diarrhea, diarrhea    Green Tea Swelling     Lip swells   W green tea and neto, Lip swells   W green tea and neto    Penicillins Syncope     Other Reaction(s): Other (See Comments), Syncope, Unknown      Other reaction(s): Other (See Comments) PASSED OUT, PASSED OUT      PASSED OUT, PASSED OUT      \"Passed out\"    Sulfamethoxazole-Trimethoprim Hives, Unknown and Rash     Other Reaction(s): Unknown    Phenylephrine-Guaifenesin Palpitations     Other Reaction(s): Other: See Comments      Other reaction(s): Other: See Comments Fast heart rate      Fast heart rate     "

## 2025-06-10 NOTE — ASSESSMENT & PLAN NOTE
Patient's goal A1c is < 7%.  Is pt at goal? No, patient's most recent A1c from 5/7/2025 was 7.8%  Patient's SMBGs are elevated over the last few weeks into the 200s.     Rationale for plan: At this time, patient wants to increase her activity and really watch the types of foods she eats to see if it will improve again. The only thing that patient can think of that changed recently is that she was switched to rosuvastatin from atorvastatin and ezetimibe was added by her cardiologist.    Medication Changes:  CONTINUE  Mounjaro 15 mg; inject 15 mg once weekly on Saturdays  Glimepiride 4 mg; take 1 tablet BID    Monitoring and Education:  Answered all patient questions and concerns.

## 2025-06-11 NOTE — PROGRESS NOTES
"Physical Therapy    Physical Therapy Treatment    Patient Name: Earline Villagran  MRN: [id number]  Today's Date: 6/12/2025  Time Calculation  Start Time: 0721  Stop Time: 0802  Time Calculation (min): 41 min     PT Therapeutic Procedures Time Entry  Neuromuscular Re-Education Time Entry: 18  Therapeutic Activity Time Entry: 20       Visit number: 8  Insurance: Payor: [employer] / Plan: [employer] / Product Type: *No Product type* /   Plan of Care: 7/16/25  Authorization: 16 Visits  04/17/2025-07/10/2025  Primary Diagnosis: Vertigo      Assessment:  Based on positional testing performed this date, patient with bilateral ageotropic nystagmus with R roll symptoms greater indicating a left cupulolithiasis. Therefore, issued prolonged right sidelying for home to clear the L horizontal canal, she was educated that this may take 4-6 weeks. She was with mild sway during corner balance activities performed this date, loli tandem stance R>L. She was able to maintain 10 sec holds in all positions with verbal cues for \"feel feet and find a focus point\". Patient would continue to benefit from skilled PT to continue to improve strength, balance, gait, and overall functional mobility.    Plan: Continue per plan of care to improve functional strength and balance. Cont. Corner balance, walking balance        Current Problem  Problem List Items Addressed This Visit    None  Visit Diagnoses         Codes      Vertigo    -  Primary R42      Headache     R51.9      Convergence insufficiency     H51.11      Impaired mobility     Z74.09            Subjective    Patient states that her dizziness is better. She notes that dizziness is 1/10 in general. She feels a little off when rolling to R side.     Precautions:   Precautions  STEADI Fall Risk Score (The score of 4 or more indicates an increased risk of falling): 2  Precautions Comment: Mild Fall Risk    Pain  Pain Assessment  Pain Assessment: 0-10  0-10 " (Numeric) Pain Score: 0 - No pain    Objective     POSITIONAL TESTING:  POSITION NYSTAGMUS DIRECTION DIZZINESS   Roll Left Ageotropic  1/10   Roll Right Ageotropic  2/10   Sidelying Right Left oblique down torsion 0/10   Sidelying Left Ageotropic 1/10   Comments:     Treatments:  Therapeutic Activity x 20 minutes  Positional Testing for assessment of BBPV  Educated patient on cupulolithiasis of Left Horizontal Canal  Educated patient on prolonged lying on Right and issued printed handout to allow for carry-over    Neuromuscular Re-Education x 18 minutes  Corner balance NBOS Head Turn and Nods x 10 ea *VC to feel feet  Corner balance NBOS EC  Corner balance Semi-tandem x 10 sec *VC to feel feet and find focus point  Corner balance Tandem x 10 sec R/L *VC for focus point  Corner 90* quick turns     *Bathroom break 3 min, not billed*    OP EDUCATION: Instructed patient via verbal cues, tactile cues, and demonstration for proper form and technique of exercises.  Educated patient regarding purpose of all activities.     HOME EXERCISE PROGRAM:  Access Code: [email or web address]  Program Notes  DO RIGHT EYE AND THEN LEFT EYE. ONE EYE AT A TIME.  Exercises  - Seated Horizontal Smooth Pursuit  - 1 x daily - 7 x weekly - 1 sets - 10 reps  - Seated Vertical Smooth Pursuit  - 1 x daily - 7 x weekly - 1 sets - 10 reps  - Seated Horizontal Saccades  - 1 x daily - 7 x weekly - 1 sets - 10 reps  - Seated Vertical Saccades  - 1 x daily - 7 x weekly - 1 sets - 10 reps  - Seated Upper Trapezius Stretch  - 1 x daily - 7 x weekly - 3 sets - 30 sec  hold  - Seated Levator Scapulae Stretch  - 1 x daily - 7 x weekly - 3 sets - 30 sec hold    Access Code: [email or web address]  Exercises  - Corner Balance Feet Together: Eyes Open With Head Turns  - 1 x daily - 7 x weekly - 3 sets - 10 reps  - Standing Near Stance in Corner with Eyes Closed  - 1 x daily - 7 x weekly - 3 sets - 10 reps  - Turning in Corner 360  - 1 x daily - 7 x weekly - 3 sets - 10  reps    Goals:  Active       PT Problem       Patient will report 2/10 dizziness at lowest and 4/10 dizziness at worst to demonstrate improvement in dizziness symptoms and allow for return to PLOF       Start:  04/17/25    Expected End:  07/16/25            Patient will improve DHI score by 10 pts to demonstrate improvement in dizziness symptoms and functional mobility       Start:  04/17/25    Expected End:  07/16/25            Patient will improve cervical side-bend AROM bilaterally to min limitation and cervical rotation to nil limitation to allow for unlimited ability to perform driving, chores, and reading       Start:  04/17/25    Expected End:  07/16/25            Patient will improve Post Concussive Symptom Severity Scale (PCSS) to 50 points to demonstrate decreased concussive symptoms allow for return to PLOF        Start:  04/17/25    Expected End:  07/16/25            Patient will be Independent with home exercise program to demonstrate compliance and self-management       Start:  04/17/25    Expected End:  07/16/25            Patient Stated Goal - improve dizziness       Start:  04/17/25    Expected End:  07/16/25

## 2025-06-12 ENCOUNTER — TREATMENT (OUTPATIENT)
Dept: PHYSICAL THERAPY | Facility: CLINIC | Age: 70
End: 2025-06-12
Payer: MEDICARE

## 2025-06-12 DIAGNOSIS — H51.11 CONVERGENCE INSUFFICIENCY: ICD-10-CM

## 2025-06-12 DIAGNOSIS — Z74.09 IMPAIRED MOBILITY: ICD-10-CM

## 2025-06-12 DIAGNOSIS — R42 VERTIGO: Primary | ICD-10-CM

## 2025-06-12 DIAGNOSIS — R51.9 HEADACHE: ICD-10-CM

## 2025-06-12 PROCEDURE — 97112 NEUROMUSCULAR REEDUCATION: CPT | Mod: GP

## 2025-06-12 PROCEDURE — 97530 THERAPEUTIC ACTIVITIES: CPT | Mod: GP

## 2025-06-12 ASSESSMENT — PAIN - FUNCTIONAL ASSESSMENT: PAIN_FUNCTIONAL_ASSESSMENT: 0-10

## 2025-06-12 ASSESSMENT — PAIN SCALES - GENERAL: PAINLEVEL_OUTOF10: 0 - NO PAIN

## 2025-06-17 ENCOUNTER — TREATMENT (OUTPATIENT)
Dept: PHYSICAL THERAPY | Facility: CLINIC | Age: 70
End: 2025-06-17
Payer: MEDICARE

## 2025-06-17 DIAGNOSIS — Z74.09 IMPAIRED MOBILITY: ICD-10-CM

## 2025-06-17 DIAGNOSIS — H51.11 CONVERGENCE INSUFFICIENCY: ICD-10-CM

## 2025-06-17 DIAGNOSIS — R42 VERTIGO: Primary | ICD-10-CM

## 2025-06-17 DIAGNOSIS — R51.9 HEADACHE: ICD-10-CM

## 2025-06-17 PROCEDURE — 97530 THERAPEUTIC ACTIVITIES: CPT | Mod: GP

## 2025-06-17 PROCEDURE — 97140 MANUAL THERAPY 1/> REGIONS: CPT | Mod: GP

## 2025-06-17 ASSESSMENT — PAIN SCALES - GENERAL: PAINLEVEL_OUTOF10: 0 - NO PAIN

## 2025-06-17 ASSESSMENT — PAIN - FUNCTIONAL ASSESSMENT: PAIN_FUNCTIONAL_ASSESSMENT: 0-10

## 2025-06-17 NOTE — PROGRESS NOTES
"Physical Therapy    Physical Therapy Treatment    Patient Name: Earline Villagran  MRN: [id number]  Today's Date: 6/17/2025  Time Calculation  Start Time: 0714  Stop Time: 0759  Time Calculation (min): 45 min     PT Therapeutic Procedures Time Entry  Manual Therapy Time Entry: 20  Therapeutic Activity Time Entry: 23       Visit number: 9 of 16  Insurance: Payor: [employer] / Plan: [employer]ICARE / Product Type: *No Product type* /   Plan of Care: 7/16/25  Authorization: 16 Visits  04/17/2025-07/10/2025  Primary Diagnosis: Vertigo      Assessment:  Based on positional testing performed this date continues to be consistent with left horizontal canal cupulolithiasis BPPV. Re-educated patient on L horizontal BPPV and prolonged lying on right, ensuring that she wakes up or gets up on the right side as she reported having getting up on the left side. She had an improvement of her dizziness with cervical distraction during sidelying test bilaterally but most significantly from 3/10 to 2/10 on lest side. She tolerated all manual therapy at a low grade well, she reported somewhat improved \"cloudy\" feeling at end of session. Patient would continue to benefit from skilled PT to continue to improve strength, balance, gait, and overall functional mobility.    Plan: Continue per plan of care to improve functional strength and balance. Manual for cervical related dizziness and ROM, Cont. Corner balance, walking balance        Current Problem  Problem List Items Addressed This Visit    None  Visit Diagnoses         Codes      Vertigo    -  Primary R42      Headache     R51.9      Convergence insufficiency     H51.11      Impaired mobility     Z74.09            Subjective    Patient states that she feels cloudy in the head that yesterday. She notes that after the last session she humberto good but his new feeling started yesterday. She took a migraine pill yesterday afternoon. She reports that room spinning " dizziness is better and that she feels it when she rolls to left. She states that she would roll to the L before getting up instead of getting up from the right.     Precautions:   Precautions  STEADI Fall Risk Score (The score of 4 or more indicates an increased risk of falling): 2  Precautions Comment: Mild Fall Risk    Pain  Pain Assessment  Pain Assessment: 0-10  0-10 (Numeric) Pain Score: 0 - No pain    Objective     POSITIONAL TESTING:  POSITION NYSTAGMUS DIRECTION DIZZINESS   Roll Left Ageotropic  1/10   Roll Right Ageotropic  3/10   Sidelying Right Left horizontal  1/10   Sidelying Left Left horizontal  3/10   Comments: Dizziness symptoms improved with cervical distraction in sidelying test bilaterally    Treatments:  Therapeutic Activity x 23 minutes  Upper trap stretch 3 x 30 sec R/L  Levator stretch 3 x 30 sec R/L  Educated on the importance of cervical stretches as dizziness can be related to cervical pain and tightness, loli feeling of cloudy head  Re-educated patient on cupulolithiasis of Left Horizontal Canal and correct performance of prolonged lying on Right  Positional Testing for assessment of BBPV    Manual Therapy x 20 minutes  Patient in supine. STM to cervical paraspinals, bilateral levator and upper trap. Suboccipital release, low grade cervical distraction    OP EDUCATION: Instructed patient via verbal cues, tactile cues, and demonstration for proper form and technique of exercises.  Educated patient regarding purpose of all activities.     HOME EXERCISE PROGRAM:  Access Code: [email or web address]  Program Notes  DO RIGHT EYE AND THEN LEFT EYE. ONE EYE AT A TIME.  Exercises  - Seated Horizontal Smooth Pursuit  - 1 x daily - 7 x weekly - 1 sets - 10 reps  - Seated Vertical Smooth Pursuit  - 1 x daily - 7 x weekly - 1 sets - 10 reps  - Seated Horizontal Saccades  - 1 x daily - 7 x weekly - 1 sets - 10 reps  - Seated Vertical Saccades  - 1 x daily - 7 x weekly - 1 sets - 10 reps  - Seated Upper Trapezius  Stretch  - 1 x daily - 7 x weekly - 3 sets - 30 sec  hold  - Seated Levator Scapulae Stretch  - 1 x daily - 7 x weekly - 3 sets - 30 sec hold    Access Code: [email or web address]  Exercises  - Corner Balance Feet Together: Eyes Open With Head Turns  - 1 x daily - 7 x weekly - 3 sets - 10 reps  - Standing Near Stance in Corner with Eyes Closed  - 1 x daily - 7 x weekly - 3 sets - 10 reps  - Turning in Corner 360  - 1 x daily - 7 x weekly - 3 sets - 10 reps    Goals:  Active       PT Problem       Patient will report 2/10 dizziness at lowest and 4/10 dizziness at worst to demonstrate improvement in dizziness symptoms and allow for return to PLOF       Start:  04/17/25    Expected End:  07/16/25            Patient will improve DHI score by 10 pts to demonstrate improvement in dizziness symptoms and functional mobility       Start:  04/17/25    Expected End:  07/16/25            Patient will improve cervical side-bend AROM bilaterally to min limitation and cervical rotation to nil limitation to allow for unlimited ability to perform driving, chores, and reading       Start:  04/17/25    Expected End:  07/16/25            Patient will improve Post Concussive Symptom Severity Scale (PCSS) to 50 points to demonstrate decreased concussive symptoms allow for return to PLOF        Start:  04/17/25    Expected End:  07/16/25            Patient will be Independent with home exercise program to demonstrate compliance and self-management       Start:  04/17/25    Expected End:  07/16/25            Patient Stated Goal - improve dizziness       Start:  04/17/25    Expected End:  07/16/25

## 2025-06-24 ENCOUNTER — APPOINTMENT (OUTPATIENT)
Dept: PHYSICAL THERAPY | Facility: CLINIC | Age: 70
End: 2025-06-24
Payer: MEDICARE

## 2025-06-24 ENCOUNTER — DOCUMENTATION (OUTPATIENT)
Dept: PHYSICAL THERAPY | Facility: CLINIC | Age: 70
End: 2025-06-24
Payer: MEDICARE

## 2025-06-24 NOTE — PROGRESS NOTES
Physical Therapy                 Therapy Communication Note    Patient Name: Earline Villagran  MRN: [id number]  Department:   Room: Room/bed info not found  Today's Date: 6/24/2025     Discipline: Physical Therapy    Missed Time: Cancel    Comment: Patient cancelled via MyChart without reason given

## 2025-06-25 ENCOUNTER — OFFICE VISIT (OUTPATIENT)
Dept: PRIMARY CARE | Facility: CLINIC | Age: 70
End: 2025-06-25
Payer: MEDICARE

## 2025-06-25 VITALS
BODY MASS INDEX: 35.87 KG/M2 | HEART RATE: 110 BPM | DIASTOLIC BLOOD PRESSURE: 80 MMHG | WEIGHT: 223.2 LBS | OXYGEN SATURATION: 100 % | TEMPERATURE: 96.8 F | SYSTOLIC BLOOD PRESSURE: 128 MMHG | HEIGHT: 66 IN

## 2025-06-25 DIAGNOSIS — R21 FACIAL RASH: Primary | ICD-10-CM

## 2025-06-25 PROCEDURE — 3008F BODY MASS INDEX DOCD: CPT | Performed by: FAMILY MEDICINE

## 2025-06-25 PROCEDURE — 3079F DIAST BP 80-89 MM HG: CPT | Performed by: FAMILY MEDICINE

## 2025-06-25 PROCEDURE — 1036F TOBACCO NON-USER: CPT | Performed by: FAMILY MEDICINE

## 2025-06-25 PROCEDURE — 99212 OFFICE O/P EST SF 10 MIN: CPT | Performed by: FAMILY MEDICINE

## 2025-06-25 PROCEDURE — 1160F RVW MEDS BY RX/DR IN RCRD: CPT | Performed by: FAMILY MEDICINE

## 2025-06-25 PROCEDURE — 3074F SYST BP LT 130 MM HG: CPT | Performed by: FAMILY MEDICINE

## 2025-06-25 PROCEDURE — 1159F MED LIST DOCD IN RCRD: CPT | Performed by: FAMILY MEDICINE

## 2025-06-25 ASSESSMENT — ENCOUNTER SYMPTOMS
DEPRESSION: 0
OCCASIONAL FEELINGS OF UNSTEADINESS: 0
LOSS OF SENSATION IN FEET: 0

## 2025-06-25 NOTE — PROGRESS NOTES
"Subjective   Patient ID: Earline Villagran is a 69 y.o. female who presents for Rash.  3 days ago was outside [facility].  Didn't have sunscreen on.  That night, she started itching on both cheeks, and next day noted raised red rash.  No new foods or products.  Has been on new cardiac meds for 3 weeks.  Rash has not spread anywhere else.  It may be slightly better, but still itches.  Nothing tried for it.          Review of Systems    Objective   /80 (BP Location: Right arm, Patient Position: Sitting, BP Cuff Size: Large adult)   Pulse 110   Temp 36 °C (96.8 °F) (Temporal)   Ht 1.676 m (5' 6\")   Wt 101 kg (223 lb 3.2 oz)   SpO2 100%   BMI 36.03 kg/m²     Physical Exam  Vitals reviewed.   Constitutional:       Appearance: Normal appearance.   Lymphadenopathy:      Cervical: No cervical adenopathy.   Skin:     Comments: Both cheeks with fine papular flesh colored rash.   Neurological:      Mental Status: She is alert.           Assessment/Plan   Problem List Items Addressed This Visit    None  Visit Diagnoses         Facial rash    -  Primary               "

## 2025-06-25 NOTE — PATIENT INSTRUCTIONS
Suspect rash is reaction to sun or heat.  For now can try hydroxyzine or benadryl to help with itch, and may help rash go away.  If not improving, or if spreading or changing, let me know.  
English

## 2025-06-30 NOTE — PROGRESS NOTES
Physical Therapy    Physical Therapy Treatment and Discharge    Patient Name: Earline Villagran  MRN: [id number]  Today's Date: 7/1/2025  Time Calculation  Start Time: 0809  Stop Time: 0855  Time Calculation (min): 46 min     PT Therapeutic Procedures Time Entry  Therapeutic Exercise Time Entry: 15  Neuromuscular Re-Education Time Entry: 28       Visit number: 10 of 16  Insurance: Payor: [facility] MEDICARE / Plan: [employer] MEDICARE / Product Type: *No Product type* /   Plan of Care: 7/16/25  Authorization: 16 Visits  04/17/2025-07/10/2025  Primary Diagnosis: Vertigo      Assessment:  Earline Villagran has completed 10 sessions of physical therapy treatment with emphasis upon canalith repositioning, habituation, and balance training for multi-canal BPPV and dizziness. Patient reports 0/10 room spinning dizziness in last 2 weeks. She reports that she is able to sleep on her R side again and lay on back while watching TV without symptoms. Patient cervical AROM has improved in all 4 planes to allow for unlimited ability to perform driving, chores, and reading. She has had improved light-sensitivity with emma colored glasses that she continues to wear. Patient has significantly improved her overall dizziness symptoms and functional mobility to allow for return to PLOF based on DHI and PCSS outcome measures.   Currently, Earline Villagran has met all established PT POC goals at this time and is able to continue with her HEP IND for further progress/management of functional mobility. Therefore, skilled PT services are no longer warranted/necessary. Earline Villagran to be discharged from PT services and back to care under referring physician. Earline Villagran voiced agreement and satisfaction with this plan.       Plan: Discharge to Independent Home Program for further maintenance of functional mobility.       Current Problem  Problem List Items Addressed This Visit    None  Visit Diagnoses          Codes      Vertigo    -  Primary R42      Headache     R51.9      Convergence insufficiency     H51.11      Impaired mobility     Z74.09            Subjective    Patient states last week she had a sinus headache with fogginess last week. She states that she does her cervical stretches daily but not the other exercises as she has a lot to pack. Denies room spinning dizziness in last week and is laying on R side. She states that she is doing chair exercises YouTube videos.    Dizziness: 0/10 room spinning dizziness in last 2 weeks    Precautions:   Precautions  STEADI Fall Risk Score (The score of 4 or more indicates an increased risk of falling): 2  Precautions Comment: Mild Fall Risk    Pain  Pain Assessment  Pain Assessment: 0-10  0-10 (Numeric) Pain Score: 0 - No pain    Objective     Cervical ROM:    AROM   EXTENSION Nil limitation - 55*   FLEXION Nil limitation - 60*   RIGHT ROTATION Nil limitation - 75*   LEFT ROTATION Nil limitation - 75*   RIGHT LATERAL FLEXION Min limitation - 40*   LEFT LATERAL FLEXION Min limitation - 40*     Outcome Measures:  Other Measures  Dizziness Handicap Inventory: 0  Other Outcome Measures: PCSS: 3      Treatments:  Therapeutic Activity x 15 minutes  Upper trap stretch 2 x 30 sec R/L  Levator stretch 2 x 30 sec R/L  Cervical ROM assessment - see objective section    NEUROMUSCULAR RE-EDUCATION x 28 minutes  - Semi-Tandem Corner Balance: Eyes Open With Head Turns x 10 horizontal and vertical  - Corner Balance Feet Together With Eyes Closed x 20 sec   - Turning in Corner 180 x 8 R/L  - Tandem Stance in Corner x 10 sec  - Semi-tandem Diagonal Head Nod Vestibular Habituation x 10 R/L  - Walking with Head Rotation 4 x 20 feet  - Forward Walking with Diagonal Head Turns 4 x 20 feet *VC for direction of head mvmt    OP EDUCATION: Instructed patient via verbal cues, tactile cues, and demonstration for proper form and technique of exercises.  Educated patient regarding purpose of all  activities.     HOME EXERCISE PROGRAM:  Access Code: [email or web address]  Program Notes  DO RIGHT EYE AND THEN LEFT EYE. ONE EYE AT A TIME.  Exercises  - Seated Horizontal Smooth Pursuit  - 1 x daily - 7 x weekly - 1 sets - 10 reps  - Seated Vertical Smooth Pursuit  - 1 x daily - 7 x weekly - 1 sets - 10 reps  - Seated Horizontal Saccades  - 1 x daily - 7 x weekly - 1 sets - 10 reps  - Seated Vertical Saccades  - 1 x daily - 7 x weekly - 1 sets - 10 reps  - Seated Upper Trapezius Stretch  - 1 x daily - 7 x weekly - 3 sets - 30 sec  hold  - Seated Levator Scapulae Stretch  - 1 x daily - 7 x weekly - 3 sets - 30 sec hold    Access Code: [email or web address]  Exercises  - Semi-Tandem Corner Balance: Eyes Open With Head Turns  - 1 x daily - 7 x weekly - 3 sets - 10 reps  - Corner Balance Feet Together With Eyes Closed  - 1 x daily - 7 x weekly - 3 sets - 10 reps  - Turning in Corner 360  - 1 x daily - 7 x weekly - 3 sets - 5 reps  - Tandem Stance in Corner  - 1 x daily - 7 x weekly - 3 sets - 10 sec hold  - Standing Diagonal Head Nod Vestibular Habituation  - 1 x daily - 7 x weekly - 3 sets - 10 reps  - Walking with Head Rotation  - 1 x daily - 7 x weekly - 3 sets - 10 reps  - Forward Walking with Diagonal Head Turns  - 1 x daily - 7 x weekly - 3 sets - 10 reps    Goals:  Resolved       PT Problem       Patient will report 2/10 dizziness at lowest and 4/10 dizziness at worst to demonstrate improvement in dizziness symptoms and allow for return to PLOF (Met)       Start:  04/17/25    Expected End:  07/16/25    Resolved:  07/01/25         Patient will improve DHI score by 10 pts to demonstrate improvement in dizziness symptoms and functional mobility (Met)       Start:  04/17/25    Expected End:  07/16/25    Resolved:  07/01/25         Patient will improve cervical side-bend AROM bilaterally to min limitation and cervical rotation to nil limitation to allow for unlimited ability to perform driving, chores, and reading (Met)       Start:   04/17/25    Expected End:  07/16/25    Resolved:  07/01/25         Patient will improve Post Concussive Symptom Severity Scale (PCSS) to 50 points to demonstrate decreased concussive symptoms allow for return to PLOF  (Met)       Start:  04/17/25    Expected End:  07/16/25    Resolved:  07/01/25         Patient will be Independent with home exercise program to demonstrate compliance and self-management (Met)       Start:  04/17/25    Expected End:  07/16/25    Resolved:  07/01/25         Patient Stated Goal - improve dizziness (Met)       Start:  04/17/25    Expected End:  07/16/25    Resolved:  07/01/25

## 2025-07-01 ENCOUNTER — TREATMENT (OUTPATIENT)
Dept: PHYSICAL THERAPY | Facility: CLINIC | Age: 70
End: 2025-07-01
Payer: MEDICARE

## 2025-07-01 DIAGNOSIS — R42 VERTIGO: Primary | ICD-10-CM

## 2025-07-01 DIAGNOSIS — H51.11 CONVERGENCE INSUFFICIENCY: ICD-10-CM

## 2025-07-01 DIAGNOSIS — Z74.09 IMPAIRED MOBILITY: ICD-10-CM

## 2025-07-01 DIAGNOSIS — R51.9 HEADACHE: ICD-10-CM

## 2025-07-01 PROCEDURE — 97110 THERAPEUTIC EXERCISES: CPT | Mod: GP

## 2025-07-01 PROCEDURE — 97112 NEUROMUSCULAR REEDUCATION: CPT | Mod: GP

## 2025-07-01 ASSESSMENT — PAIN SCALES - GENERAL: PAINLEVEL_OUTOF10: 0 - NO PAIN

## 2025-07-01 ASSESSMENT — PAIN - FUNCTIONAL ASSESSMENT: PAIN_FUNCTIONAL_ASSESSMENT: 0-10

## 2025-07-08 ENCOUNTER — APPOINTMENT (OUTPATIENT)
Dept: PHYSICAL THERAPY | Facility: CLINIC | Age: 70
End: 2025-07-08
Payer: MEDICARE

## 2025-07-08 ENCOUNTER — APPOINTMENT (OUTPATIENT)
Dept: PHARMACY | Facility: HOSPITAL | Age: 70
End: 2025-07-08
Payer: MEDICARE

## 2025-07-08 DIAGNOSIS — Z79.4 TYPE 2 DIABETES MELLITUS WITH HYPERGLYCEMIA, WITH LONG-TERM CURRENT USE OF INSULIN: ICD-10-CM

## 2025-07-08 DIAGNOSIS — E11.65 TYPE 2 DIABETES MELLITUS WITH HYPERGLYCEMIA, WITH LONG-TERM CURRENT USE OF INSULIN: ICD-10-CM

## 2025-07-08 NOTE — PROGRESS NOTES
Clinical Pharmacy Appointment    Patient ID: Earline Villagran is a 69 y.o. female who presents for Diabetes.    Pt is here for Follow Up appointment.     Referring Provider: Patricia Calderon MD  PCP: Patricia Calderon MD  Last visit with PCP: 3/13/2025   Next visit with PCP: 2025    Subjective   Drug Interactions  No relevant drug interactions were noted.    Medication System Management  Patient's preferred pharmacy: [id number] in [place]  Adherence/Organization: No concerns at this time  Affordability/Accessibility: No concerns at this time    HPI  DIABETES MELLITUS TYPE 2:    Diagnosed with diabetes:  20 years ago.   Known diabetic complications: hypeglycemia.  Does patient follow with Endocrinology: Yes  Last optometry exam: 3/19/2025    Current diabetic medications include:  Mounjaro 15 mg; inject 15 mg once weekly on   Glimepiride 4 mg; take 1 tablet BID    Clarifications to above regimen: None  Adverse Effects: None    Past diabetic medications include:  Trulicity, Ozempic, Januvia, Glipizide, Metformin, Jardiance    Glucose Readings:  Glucometer/CGM Type: OneTouch Ultra Glucometer  Patient tests BG 2 times per day    Current home BG readings (mg/dL): FB-110 mg/dL; PPB mg/dL; 198;   Previous home BG readings (mg/dL): Ave: 115 mg/dL    Any episodes of hypoglycemia? No, lowest in the upper 70s.  Did patient treat episode of hypoglycemia appropriately? N/A  Does the patient have a prescription for ready-to-use Glucagon? Not on insulin    Lifestyle:  Diet: 2 meals/day.   BK: Cereal, banana  LN: Salad, meat, shrimp  Drinks: Water, sparkling water  Exercise: patient starting to do more exercise  Tobacco history: Former smoker    Secondary Prevention:  Statin? Yes  ACE-I/ARB? No  Aspirin? Yes    Pertinent PMH Review:  PMH of Pancreatitis: No  PMH of Retinopathy: No  PMH of Urinary Tract Infections: No  PMH of MTC: No  PMH of MEN2: No  UACR/EGFR in last year?:  No  Albumin/Creatine Ratio   Date Value Ref Range Status   06/04/2022 17.5 0.0 - 30.0 ug/mg crt Final     Immunizations:  Influenza? Yes  COVID? Yes  Pneumonia? Most recent is Prevnar 13 from 8/24/2021    Objective   Allergies[1]  Social History     Social History Narrative    Not on file      Medication Review  Current Outpatient Medications   Medication Instructions    albuterol 90 mcg/actuation inhaler 2 puffs, inhalation, Every 6 hours PRN    amLODIPine (NORVASC) 5 mg, oral, Daily    aspirin 81 mg, Daily    blood sugar diagnostic (OneTouch Ultra Test) strip TEST GLUCOSE 2 TO 3 TIMES DAILY    carvedilol (COREG) 3.125 mg, oral, 2 times daily    ezetimibe (ZETIA) 10 mg, oral, Daily    gabapentin (NEURONTIN) 100 mg, oral, 3 times daily    glimepiride (AMARYL) 4 mg, oral    hydroCHLOROthiazide (HYDRODIURIL) 25 mg, oral, Daily    hydrOXYzine HCL (Atarax) 10 mg tablet TAKE 1 TABLET BY MOUTH EVERY 8  HOURS IF NEEDED FOR ITCHING    levothyroxine (SYNTHROID, LEVOXYL) 125 mcg, oral, Daily    meclizine (ANTIVERT) 25 mg, oral, 3 times daily PRN    Mounjaro 15 mg, subcutaneous, Weekly    nortriptyline (PAMELOR) 25 mg, oral, 2 times daily    omeprazole (PRILOSEC) 20 mg, oral, Daily    OneTouch Delica Plus Lancet 33 gauge misc USE WITH BLOOD GLUCOSE TEST ONCE DAILY    OneTouch Ultra2 Meter misc 1 DEVICE ONCE DAILY. TEST ONE TIME A DAY. INSULIN DEP? NO E11.9 DM 2    rosuvastatin (CRESTOR) 40 mg, oral, Daily    Ubrelvy 50 mg, oral, Daily PRN      Vitals  BP Readings from Last 2 Encounters:   06/25/25 128/80   06/04/25 116/78     BMI Readings from Last 1 Encounters:   06/25/25 36.03 kg/m²      Labs  A1C  Lab Results   Component Value Date    HGBA1C 7.8 (H) 05/07/2025    HGBA1C 11.8 (A) 09/26/2024    HGBA1C 10.6 (H) 05/19/2024     BMP  Lab Results   Component Value Date    CALCIUM 9.2 05/28/2025     05/28/2025    K 4.0 05/28/2025    CO2 24 05/28/2025     05/28/2025    BUN 16 05/28/2025    CREATININE 1.16 (H) 05/28/2025     EGFR 51 (L) 05/28/2025     LFTs  Lab Results   Component Value Date    ALT 14 03/31/2025    AST 14 03/31/2025    ALKPHOS 74 03/31/2025    BILITOT 0.4 03/31/2025     FLP  Lab Results   Component Value Date    TRIG 161 (H) 12/29/2023    CHOL 222 (H) 12/29/2023    LDLF 198 (H) 08/09/2023    LDLCALC 140 (H) 12/29/2023    HDL 50.3 12/29/2023     Urine Microalbumin  Lab Results   Component Value Date    MICROALBCREA 17.5 06/04/2022     Weight Management  Wt Readings from Last 3 Encounters:   06/25/25 101 kg (223 lb 3.2 oz)   06/04/25 103 kg (226 lb 6 oz)   05/28/25 103 kg (227 lb 1.2 oz)      There is no height or weight on file to calculate BMI.     Assessment/Plan   Problem List Items Addressed This Visit       Type 2 diabetes mellitus with hyperglycemia, with long-term current use of insulin    Patient's goal A1c is < 7%.  Is pt at goal? No, patient's most recent A1c from 5/7/2025 was 7.8%  Patient's SMBGs are mostly at goal, sometime PPBG is slightly elevated at up to 198 mg/dL.     Rationale for plan: patient will be having a procedure on 7/23/2025. Counseled patient to confirm with her surgical team if she should hold the Mounjaro for 1 week vs 2 weeks before procedure. Counseled patient that she can return to her regular dose after she holds up to 2 doses of Mounjaro.    Medication Changes:  CONTINUE  Glimepiride 4 mg; take 1 tablet BID  Mounjaro 15 mg; inject 15 mg once weekly on Saturdays    Monitoring and Education:   Counseled patient on medications including dose, adverse effects  Answered all patient questions and concerns at this time         Relevant Orders    Referral to Clinical Pharmacy       Clinical Pharmacist follow-up: 8/5/2025, Telehealth visit    Patient is not followed in [facility]. (If yes, track minutes under compass emma at each visit)    Continue all meds under the continuation of care with the referring provider and clinical pharmacy team.    Thank you,  Theresa Castañeda, PharmD  Clinical  "Pharmacist - Primary Care  [phone]  7/8/2025    Verbal consent to manage patient's drug therapy was obtained from the patient. They were informed they may decline to participate or withdraw from participation in pharmacy services at any time.         [1]   Allergies  Allergen Reactions    Raspberry Shortness of breath    Ciprofloxacin Rash     Itching, Itching    Clindamycin Diarrhea     Other Reaction(s): GI Upset      diarrhea, diarrhea    Green Tea Swelling     Lip swells   W green tea and neto, Lip swells   W green tea and neto    Penicillins Syncope     Other Reaction(s): Other (See Comments), Syncope, Unknown      Other reaction(s): Other (See Comments) PASSED OUT, PASSED OUT      PASSED OUT, PASSED OUT      \"Passed out\"    Sulfamethoxazole-Trimethoprim Hives, Unknown and Rash     Other Reaction(s): Unknown    Phenylephrine-Guaifenesin Palpitations     Other Reaction(s): Other: See Comments      Other reaction(s): Other: See Comments Fast heart rate      Fast heart rate     "

## 2025-07-08 NOTE — ASSESSMENT & PLAN NOTE
Patient's goal A1c is < 7%.  Is pt at goal? No, patient's most recent A1c from 5/7/2025 was 7.8%  Patient's SMBGs are mostly at goal, sometime PPBG is slightly elevated at up to 198 mg/dL.     Rationale for plan: patient will be having a procedure on 7/23/2025. Counseled patient to confirm with her surgical team if she should hold the Mounjaro for 1 week vs 2 weeks before procedure. Counseled patient that she can return to her regular dose after she holds up to 2 doses of Mounjaro.    Medication Changes:  CONTINUE  Glimepiride 4 mg; take 1 tablet BID  Mounjaro 15 mg; inject 15 mg once weekly on Saturdays    Monitoring and Education:   Counseled patient on medications including dose, adverse effects  Answered all patient questions and concerns at this time

## 2025-07-22 ENCOUNTER — APPOINTMENT (OUTPATIENT)
Dept: NEUROLOGY | Facility: CLINIC | Age: 70
End: 2025-07-22
Payer: MEDICARE

## 2025-07-23 ENCOUNTER — HOSPITAL ENCOUNTER (OUTPATIENT)
Facility: CLINIC | Age: 70
Setting detail: OUTPATIENT SURGERY
Discharge: HOME | End: 2025-07-23
Attending: OPHTHALMOLOGY | Admitting: OPHTHALMOLOGY
Payer: MEDICARE

## 2025-07-23 ENCOUNTER — ANESTHESIA EVENT (OUTPATIENT)
Dept: OPERATING ROOM | Facility: CLINIC | Age: 70
End: 2025-07-23
Payer: MEDICARE

## 2025-07-23 ENCOUNTER — ANESTHESIA (OUTPATIENT)
Dept: OPERATING ROOM | Facility: CLINIC | Age: 70
End: 2025-07-23
Payer: MEDICARE

## 2025-07-23 VITALS
SYSTOLIC BLOOD PRESSURE: 128 MMHG | HEIGHT: 66 IN | DIASTOLIC BLOOD PRESSURE: 59 MMHG | TEMPERATURE: 97.2 F | WEIGHT: 220.68 LBS | RESPIRATION RATE: 16 BRPM | OXYGEN SATURATION: 100 % | HEART RATE: 89 BPM | BODY MASS INDEX: 35.47 KG/M2

## 2025-07-23 DIAGNOSIS — H25.811 COMBINED FORM OF AGE-RELATED CATARACT, RIGHT EYE: Primary | ICD-10-CM

## 2025-07-23 DIAGNOSIS — H18.513 FUCHS' CORNEAL DYSTROPHY OF BOTH EYES: ICD-10-CM

## 2025-07-23 DIAGNOSIS — H57.03 SMALL PUPIL: ICD-10-CM

## 2025-07-23 DIAGNOSIS — Z79.4 TYPE 2 DIABETES MELLITUS WITH HYPERGLYCEMIA, WITH LONG-TERM CURRENT USE OF INSULIN: ICD-10-CM

## 2025-07-23 DIAGNOSIS — E11.65 TYPE 2 DIABETES MELLITUS WITH HYPERGLYCEMIA, WITH LONG-TERM CURRENT USE OF INSULIN: ICD-10-CM

## 2025-07-23 LAB — GLUCOSE BLD MANUAL STRIP-MCNC: 144 MG/DL (ref 74–99)

## 2025-07-23 PROCEDURE — 2500000001 HC RX 250 WO HCPCS SELF ADMINISTERED DRUGS (ALT 637 FOR MEDICARE OP): Performed by: OPHTHALMOLOGY

## 2025-07-23 PROCEDURE — 3700000001 HC GENERAL ANESTHESIA TIME - INITIAL BASE CHARGE: Performed by: OPHTHALMOLOGY

## 2025-07-23 PROCEDURE — V2785 CORNEAL TISSUE PROCESSING: HCPCS | Performed by: OPHTHALMOLOGY

## 2025-07-23 PROCEDURE — 65756 CORNEAL TRNSPL ENDOTHELIAL: CPT | Performed by: OPHTHALMOLOGY

## 2025-07-23 PROCEDURE — A65756 PR CORNEAL TRANSPLANT,ENDOTHELIAL: Performed by: ANESTHESIOLOGY

## 2025-07-23 PROCEDURE — 82947 ASSAY GLUCOSE BLOOD QUANT: CPT

## 2025-07-23 PROCEDURE — 2780000003 HC OR 278 NO HCPCS: Performed by: OPHTHALMOLOGY

## 2025-07-23 PROCEDURE — 2760000001 HC OR 276 NO HCPCS: Performed by: OPHTHALMOLOGY

## 2025-07-23 PROCEDURE — 3600000007 HC OR TIME - EACH INCREMENTAL 1 MINUTE - PROCEDURE LEVEL TWO: Performed by: OPHTHALMOLOGY

## 2025-07-23 PROCEDURE — 2500000005 HC RX 250 GENERAL PHARMACY W/O HCPCS: Performed by: OPHTHALMOLOGY

## 2025-07-23 PROCEDURE — 2500000004 HC RX 250 GENERAL PHARMACY W/ HCPCS (ALT 636 FOR OP/ED): Performed by: OPHTHALMOLOGY

## 2025-07-23 PROCEDURE — 66982 XCAPSL CTRC RMVL CPLX WO ECP: CPT | Performed by: OPHTHALMOLOGY

## 2025-07-23 PROCEDURE — V2632 POST CHMBR INTRAOCULAR LENS: HCPCS | Performed by: OPHTHALMOLOGY

## 2025-07-23 PROCEDURE — 2500000004 HC RX 250 GENERAL PHARMACY W/ HCPCS (ALT 636 FOR OP/ED): Performed by: ANESTHESIOLOGY

## 2025-07-23 PROCEDURE — 3700000002 HC GENERAL ANESTHESIA TIME - EACH INCREMENTAL 1 MINUTE: Performed by: OPHTHALMOLOGY

## 2025-07-23 PROCEDURE — 7100000009 HC PHASE TWO TIME - INITIAL BASE CHARGE: Performed by: OPHTHALMOLOGY

## 2025-07-23 PROCEDURE — A65756 PR CORNEAL TRANSPLANT,ENDOTHELIAL

## 2025-07-23 PROCEDURE — 3600000002 HC OR TIME - INITIAL BASE CHARGE - PROCEDURE LEVEL TWO: Performed by: OPHTHALMOLOGY

## 2025-07-23 PROCEDURE — 7100000010 HC PHASE TWO TIME - EACH INCREMENTAL 1 MINUTE: Performed by: OPHTHALMOLOGY

## 2025-07-23 PROCEDURE — 87102 FUNGUS ISOLATION CULTURE: CPT | Performed by: OPHTHALMOLOGY

## 2025-07-23 PROCEDURE — 2720000007 HC OR 272 NO HCPCS: Performed by: OPHTHALMOLOGY

## 2025-07-23 DEVICE — CYLINDER, SF6 GAS: Type: IMPLANTABLE DEVICE | Site: EYE | Status: FUNCTIONAL

## 2025-07-23 DEVICE — CLAREON ASPHERIC HYDROPHOBIC ACRYLIC IOL WITH THE AUTONOMEAUTOMATED PRE-LOADED DELIVERY SYSTEM
Type: IMPLANTABLE DEVICE | Site: EYE | Status: FUNCTIONAL
Brand: CLAREON™

## 2025-07-23 RX ORDER — CYCLOPENTOLATE HYDROCHLORIDE 10 MG/ML
1 SOLUTION/ DROPS OPHTHALMIC ONCE
Status: COMPLETED | OUTPATIENT
Start: 2025-07-23 | End: 2025-07-23

## 2025-07-23 RX ORDER — FENTANYL CITRATE 50 UG/ML
INJECTION, SOLUTION INTRAMUSCULAR; INTRAVENOUS AS NEEDED
Status: DISCONTINUED | OUTPATIENT
Start: 2025-07-23 | End: 2025-07-23

## 2025-07-23 RX ORDER — PROPOFOL 10 MG/ML
INJECTION, EMULSION INTRAVENOUS AS NEEDED
Status: DISCONTINUED | OUTPATIENT
Start: 2025-07-23 | End: 2025-07-23

## 2025-07-23 RX ORDER — POVIDONE-IODINE 5 %
SOLUTION, NON-ORAL OPHTHALMIC (EYE) AS NEEDED
Status: DISCONTINUED | OUTPATIENT
Start: 2025-07-23 | End: 2025-07-23 | Stop reason: HOSPADM

## 2025-07-23 RX ORDER — TROPICAMIDE 10 MG/ML
1 SOLUTION/ DROPS OPHTHALMIC ONCE
Status: COMPLETED | OUTPATIENT
Start: 2025-07-23 | End: 2025-07-23

## 2025-07-23 RX ORDER — NEOMYCIN SULFATE, POLYMYXIN B SULFATE, AND DEXAMETHASONE 3.5; 10000; 1 MG/G; [USP'U]/G; MG/G
OINTMENT OPHTHALMIC AS NEEDED
Status: DISCONTINUED | OUTPATIENT
Start: 2025-07-23 | End: 2025-07-23 | Stop reason: HOSPADM

## 2025-07-23 RX ORDER — TETRACAINE HYDROCHLORIDE 5 MG/ML
SOLUTION OPHTHALMIC AS NEEDED
Status: DISCONTINUED | OUTPATIENT
Start: 2025-07-23 | End: 2025-07-23 | Stop reason: HOSPADM

## 2025-07-23 RX ORDER — LIDOCAINE HCL/PF 100 MG/5ML
SYRINGE (ML) INTRAVENOUS AS NEEDED
Status: DISCONTINUED | OUTPATIENT
Start: 2025-07-23 | End: 2025-07-23

## 2025-07-23 RX ORDER — PREDNISOLONE ACETATE 10 MG/ML
1 SUSPENSION/ DROPS OPHTHALMIC 4 TIMES DAILY
Qty: 5 ML | Refills: 1 | Status: SHIPPED | OUTPATIENT
Start: 2025-07-23 | End: 2025-08-01 | Stop reason: SDUPTHER

## 2025-07-23 RX ORDER — MOXIFLOXACIN 5 MG/ML
1 SOLUTION/ DROPS OPHTHALMIC 4 TIMES DAILY
Qty: 5 ML | Refills: 0 | Status: SHIPPED | OUTPATIENT
Start: 2025-07-23 | End: 2025-08-01 | Stop reason: SDUPTHER

## 2025-07-23 RX ORDER — DEXAMETHASONE SODIUM PHOSPHATE 10 MG/ML
INJECTION INTRAMUSCULAR; INTRAVENOUS AS NEEDED
Status: DISCONTINUED | OUTPATIENT
Start: 2025-07-23 | End: 2025-07-23 | Stop reason: HOSPADM

## 2025-07-23 RX ORDER — LIDOCAINE HYDROCHLORIDE 10 MG/ML
INJECTION, SOLUTION EPIDURAL; INFILTRATION; INTRACAUDAL; PERINEURAL AS NEEDED
Status: DISCONTINUED | OUTPATIENT
Start: 2025-07-23 | End: 2025-07-23 | Stop reason: HOSPADM

## 2025-07-23 RX ORDER — VANCOMYCIN HYDROCHLORIDE 1 G/20ML
INJECTION, POWDER, LYOPHILIZED, FOR SOLUTION INTRAVENOUS AS NEEDED
Status: DISCONTINUED | OUTPATIENT
Start: 2025-07-23 | End: 2025-07-23 | Stop reason: HOSPADM

## 2025-07-23 RX ORDER — TOBRAMYCIN 3 MG/ML
1 SOLUTION/ DROPS OPHTHALMIC
Status: COMPLETED | OUTPATIENT
Start: 2025-07-23 | End: 2025-07-23

## 2025-07-23 RX ORDER — MIDAZOLAM HYDROCHLORIDE 1 MG/ML
INJECTION, SOLUTION INTRAMUSCULAR; INTRAVENOUS AS NEEDED
Status: DISCONTINUED | OUTPATIENT
Start: 2025-07-23 | End: 2025-07-23

## 2025-07-23 RX ADMIN — MIDAZOLAM 0.5 MG: 1 INJECTION INTRAMUSCULAR; INTRAVENOUS at 11:02

## 2025-07-23 RX ADMIN — TOBRAMYCIN 1 DROP: 3 SOLUTION/ DROPS OPHTHALMIC at 09:15

## 2025-07-23 RX ADMIN — TOBRAMYCIN 1 DROP: 3 SOLUTION/ DROPS OPHTHALMIC at 09:20

## 2025-07-23 RX ADMIN — PROPOFOL 40 MG: 10 INJECTION, EMULSION INTRAVENOUS at 11:06

## 2025-07-23 RX ADMIN — MIDAZOLAM 0.5 MG: 1 INJECTION INTRAMUSCULAR; INTRAVENOUS at 11:15

## 2025-07-23 RX ADMIN — TROPICAMIDE 1 DROP: 10 SOLUTION/ DROPS OPHTHALMIC at 09:15

## 2025-07-23 RX ADMIN — LIDOCAINE HYDROCHLORIDE 40 MG: 20 INJECTION INTRAVENOUS at 11:06

## 2025-07-23 RX ADMIN — TOBRAMYCIN 1 DROP: 3 SOLUTION/ DROPS OPHTHALMIC at 09:25

## 2025-07-23 RX ADMIN — FENTANYL CITRATE 25 MCG: 50 INJECTION, SOLUTION INTRAMUSCULAR; INTRAVENOUS at 11:15

## 2025-07-23 RX ADMIN — CYCLOPENTOLATE HYDROCHLORIDE 1 DROP: 10 SOLUTION/ DROPS OPHTHALMIC at 09:15

## 2025-07-23 RX ADMIN — PROPOFOL 30 MG: 10 INJECTION, EMULSION INTRAVENOUS at 11:08

## 2025-07-23 ASSESSMENT — PAIN - FUNCTIONAL ASSESSMENT
PAIN_FUNCTIONAL_ASSESSMENT: 0-10

## 2025-07-23 ASSESSMENT — PAIN SCALES - GENERAL
PAINLEVEL_OUTOF10: 8
PAINLEVEL_OUTOF10: 4
PAINLEVEL_OUTOF10: 0 - NO PAIN
PAINLEVEL_OUTOF10: 0 - NO PAIN
PAINLEVEL_OUTOF10: 5 - MODERATE PAIN
PAINLEVEL_OUTOF10: 0 - NO PAIN

## 2025-07-23 ASSESSMENT — COLUMBIA-SUICIDE SEVERITY RATING SCALE - C-SSRS
2. HAVE YOU ACTUALLY HAD ANY THOUGHTS OF KILLING YOURSELF?: NO
1. IN THE PAST MONTH, HAVE YOU WISHED YOU WERE DEAD OR WISHED YOU COULD GO TO SLEEP AND NOT WAKE UP?: NO

## 2025-07-23 NOTE — DISCHARGE INSTRUCTIONS
Postoperative instructions:   Lay flat as much as possible until your appointment tomorrow. You may take a break every two hours for 10 minutes to eat and use the restroom, otherwise remain flat.  Keep the patch and shield on the eye for 24 hours. Then, wear the shield at night for 7 days.   You may start your eyedrops tomorrow.   No bending, lifting more than 10lb, straining.   Do not get water directly in the eye.     Drops:   Start prednisolone acetate (pink top) four times a day in the right eye  Start moxifloxacin (tan top) four times day in the right eye  Wait 5 min between each drop.     Your follow up appt is tomorrow 7/24 at 3:45 PM.

## 2025-07-23 NOTE — BRIEF OP NOTE
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]     Procedures  Cataract extraction, intraocular lens implantation, Descemet membrane endothelial keratoplasty  71408 - TN KERATOPLASTY ENDOTHELIAL    Cataract extraction, intraocular lens implantation, Descemet membrane endothelial keratoplasty  31333 - TN XCAPSL CTRC RMVL INSJ IO LENS PROSTH W/O ECP      Surgeons      * Misty Harper - Primary    Resident/Fellow/Other Assistant:  Surgeons and Role:  * No surgeons found with a matching role *    Staff:   Circulator: Stacy Phillipsub Person: Nieves Fernandez Scrub: Rufina Fernandez Circulator: Alice Fernandez Circulator: Yasmin    Anesthesia Staff: Anesthesiologist: Jessie Orta MD  CRNA: JENA Olmedo DNP  C-AA: ABDULAZIZ Patino  SRNA: Elizabeth Woodruff RN    Procedure Summary  Anesthesia: Monitor Anesthesia Care  ASA: III  Estimated Blood Loss: 0 mL  Intra-op Medications:   Administrations occurring from 0950 to 1105 on 25:   Medication Name Total Dose   midazolam (Versed) injection 1 mg/mL 0.5 mg              Anesthesia Record               Intraprocedure I/O Totals          Intake    Dexmedetomidine 0.00 mL    The total shown is the total volume documented since Anesthesia Start was filed.    Total Intake 0 mL          Specimen:   ID Type Source Tests Collected by Time   A : Donor Cornea Swab CORNEA LEFT FUNGAL CULTURE/SMEAR Misty Harper MD 2025 1151                  Findings: corneal guttae, cataract    Complications:  None; patient tolerated the procedure well.     Disposition: PACU - hemodynamically stable.  Condition:  stable  Specimens Collected:   ID Type Source Tests Collected by Time   A : Donor Cornea Swab CORNEA LEFT FUNGAL CULTURE/SMEAR Misty Harper MD 7/23/2025 1151     Attending Attestation: I performed the procedure.    Misty Harper  Phone Number: [phone]

## 2025-07-23 NOTE — ANESTHESIA POSTPROCEDURE EVALUATION
Patient: Earline Villagran    Procedure Summary       Date: 07/23/25 [facility]    Anesthesia Start: 1102 Anesthesia Stop: 1206    Procedure: Cataract extraction, intraocular lens implantation, Descemet membrane endothelial keratoplasty (Right: Eye) Diagnosis:       Combined form of age-related cataract, right eye      Fuchs' corneal dystrophy of both eyes      (Combined form of age-related cataract, right eye [H25.811])      (Fuchs' corneal dystrophy of both eyes [H18.513])    Surgeons: Misty Harper MD Responsible Provider: Jessie Orta MD    Anesthesia Type: MAC ASA Status: 3            Anesthesia Type: MAC    Vitals Value Taken Time   /61 07/23/25 12:35   Temp 36.1 °C (97 °F) 07/23/25 12:03   Pulse 85 07/23/25 12:35   Resp 16 07/23/25 12:35   SpO2 99 % 07/23/25 12:35       Anesthesia Post Evaluation    Patient location during evaluation: PACU  Patient participation: complete - patient participated  Level of consciousness: awake  Pain management: adequate  Multimodal analgesia pain management approach  Airway patency: patent  Cardiovascular status: hemodynamically stable  Respiratory status: spontaneous ventilation  Hydration status: euvolemic  Postoperative Nausea and Vomiting: none        There were no known notable events for this encounter.

## 2025-07-23 NOTE — OP NOTE
Cataract extraction, intraocular lens implantation, Descemet membrane endothelial keratoplasty (R) Operative Note     Date: 2025  [place] [facility] [place] [facility] [place]    Name: Earline Villagran, : 1955, Age: 69 y.o., MRN: [id number], Sex: female    Diagnosis  Pre-op Diagnosis      * Combined form of age-related cataract, right eye [H25.811]     * Fuchs' corneal dystrophy of both eyes [H18.513] Post-op Diagnosis     * Combined form of age-related cataract, right eye [H25.811]     * Fuchs' corneal dystrophy of both eyes [H18.513]     Procedures  Cataract extraction, intraocular lens implantation, Descemet membrane endothelial keratoplasty  43044 - [place] KERATOPLASTY ENDOTHELIAL    Cataract extraction, intraocular lens implantation, Descemet membrane endothelial keratoplasty  15160 - [place] XCAPSL CTRC RMVL INSJ IO LENS PROSTH W/O ECP      Surgeons      * Misty Harper - Primary    Resident/Fellow/Other Assistant:  Surgeons and Role:  * No surgeons found with a matching role *    Staff:   Circulator: Stacy  Scrub Person: Nieves Fernandez Scrub: Rufina Fernandez Circulator: Alice Fernandez Circulator: Yasmin    Anesthesia Staff: Anesthesiologist: Jessie Orta MD  CRNA: JENA Olmedo DNP C-AA: ABDULAZIZ Patino  SRNA: Elizabeth Woodruff RN    Procedure Summary  Anesthesia: Monitor Anesthesia Care  ASA: III  Estimated Blood Loss: 0mL  Intra-op Medications:   Administrations occurring from 0950 to 1105 on 25:   Medication Name Total Dose   midazolam (Versed) injection 1 mg/mL 0.5 mg              Anesthesia Record               Intraprocedure I/O Totals          Intake    Dexmedetomidine 0.00 mL    The total shown is the total volume documented since Anesthesia Start was filed.    Total Intake 0 mL          Specimen:   ID Type Source Tests Collected by Time   A : Donor Cornea Swab CORNEA LEFT FUNGAL CULTURE/SMEAR Misty Harper MD 2025 1151                 Drains and/or Catheters: * None in log  *    Tourniquet Times:         Implants:  Implants       Type Name Action Serial No.       6.0MM X 13MM CLAREON 1-PIECE ASPHERIC MONOFOCAL INTRAOCULAR LENS, 22.5 DIOPTER, W/AUTONOME AUTOMATED PRE-LOADED DELIVERY SYSTEM, HYDROPHOBIC ACRYLIC Implanted [id number]      Cornea in Eusol-C, Left, Posterior Layer Only Implanted [id number]      CYLINDER, SF6 GAS - [id number] Implanted               Findings: Cataract, FECD OD, small pupil    Indications: Earline Villagran is an 69 y.o. female who is having surgery for Combined form of age-related cataract, right eye [H25.811]  Fuchs' corneal dystrophy of both eyes [H18.513].     The patient was seen in the preoperative area. The risks, benefits, complications, treatment options, non-operative alternatives, expected recovery and outcomes were discussed with the patient. The possibilities of reaction to medication, pulmonary aspiration, injury to surrounding structures, bleeding, recurrent infection, the need for additional procedures, failure to diagnose a condition, and creating a complication requiring transfusion or operation were discussed with the patient. The patient concurred with the proposed plan, giving informed consent.  The site of surgery was properly noted/marked if necessary per policy. The patient has been actively warmed in preoperative area. Preoperative antibiotics have been ordered and given within 1 hours of incision. Venous thrombosis prophylaxis are not indicated.    Procedure Details:   DESCRIPTION OF PROCEDURE:  The patient was identified in the preop holding area where the right eye was marked as the operative eye.  The patient was brought back to the operating room.  A timeout was performed. The local anesthetic used was a 1:1 mixture of 2% Xylocaine and 0.5% bupivacaine. 5 mL were used to perform a retrobulbar block of the operative eye. The patient was prepped and draped in the routine sterile ophthalmic fashion.  An  eyelid speculum  was placed and the operating microscope was brought over the eye. Next, a 8.0 mm marking trephine was selected and a superficial mady was made on the corneal epithelium. The mady was dried with Weck-Cels and then marked with a sterile marker.     2 paracentesis stab incisions were made to the left and right of the planned cataract incision with a 15-degree supersharp blade. Helon was used to replace the aqueous humor.  A temporal clear corneal wound was fashioned beginning at the limbus with a 2.6 mm keratome, extending 2 mm into clear cornea before entering the anterior chamber. The pupil was found to be small (dilated pupil size 4mm with concern for floppy iris). A 6.25 Malyugin ring was used to stretch the iris. A continuous tear circular capsulorhexis of approximately 5 mm in diameter was performed.  Hydrodissection was performed using a canula. Using the Ozil handpiece on the Gremln Lens Removal System, the nucleus was emulsified and aspirated using a divide-and-conquer technique.  Residual cortex was removed from the eye with the irrigation/aspiration bimanually.  ProVisc was used to inflate the capsular bag.    The lens implant was inspected and found to be free of visible defects. The lens used was an Jorge model [id number] power 22.5 diopter intraocular lens.   The lens was inserted into the capsular bag using the [place] insertion mechanism.  The lens was centered with a Chacon hook.     A reverse Sinskey hook was used to score the central Descemet's membrane. The central Descemet's membrane was removed from the anterior segment. The Malyugin ring was then removed from the eye. An inferior peripheral iridotomy was fashioned using a sinsky hook and a 30 gauge needle.     The Straiko tube containing the tissue was taken out of its container. Optisol was flushed out of the tube. The tissue was injected into the anterior chamber. It was positioned into place and 20% SF6 gas was injected into the AC.        After a duration of  4 minutes, an air fluid exchange was performed to leave a 80% air bubble.  Subconjunctival injections of Ancef, dexamethasone were placed.  The case was ended by removing the lid speculum and placing Polysporin and patching and shielding the eye.  The patient tolerated the procedure well.    DISPOSITION:  To recovery and then home.  To spend one hour in the recovery room in the supine position.    POSTOPERATIVE PLAN:    1.  Keep the eye patched overnight.  Keep the patch dry.  2.  Maintain a supine position as much as possible.         3.   prescriptions.   Evidence of Infection: No   Complications:  None; patient tolerated the procedure well.    Disposition: PACU - hemodynamically stable.  Condition: stable                 Additional Details: None    Attending Attestation: I performed the procedure.    Misty Harper  Phone Number: [phone]

## 2025-07-23 NOTE — ANESTHESIA PREPROCEDURE EVALUATION
Patient: Earline Villagran    Procedure Information       Date/Time: 07/23/25 0950    Procedure: Cataract extraction, intraocular lens implantation, Descemet membrane endothelial keratoplasty (Right)    [place] [facility] [place] [facility] [place] [facility] / [facility] [place] [facility] [place]    Surgeons: Misty Harper MD            Relevant Problems   Cardiac   (+) Atherosclerosis of native artery of both lower extremities, with unspecified presence of clinical manifestation   (+) Benign essential hypertension   (+) Coronary atherosclerosis   (+) Hyperlipidemia      Pulmonary   (+) GARCIA (dyspnea on exertion)      Neuro   (+) Chronic intractable headache      GI   (+) Gastroesophageal reflux disease      Endocrine   (+) CKD stage 3 due to type 2 diabetes mellitus (Multi)   (+) Class 2 severe obesity due to excess calories with serious comorbidity and body mass index (BMI) of 37.0 to 37.9 in adult   (+) Hypothyroidism   (+) Type 2 diabetes mellitus with diabetic neuropathy, unspecified (Multi)   (+) Type 2 diabetes mellitus with hyperglycemia, with long-term current use of insulin     Encounter Date: 06/04/25   ECG 12 lead (Clinic Performed)   Result Value    Ventricular Rate 114    Atrial Rate 114    NY Interval 152    QRS Duration 88    QT Interval 336    QTC Calculation(Bazett) 463    P Axis 72    R Axis 86    T Axis 60    QRS Count 19    Q Onset 218    P Onset 142    P Offset 195    T Offset 386    QTC Fredericia 416    Narrative    Sinus tachycardia  Otherwise normal ECG  When compared with ECG of 04-APR-2025 08:21,  No significant change was found  Confirmed by Aram Palumbo ([id number]) on 6/6/2025 8:38:03 AM     Results for orders placed during the hospital encounter of 05/19/24    Transthoracic Echo (TTE) Complete    CONCLUSIONS:  1. Left ventricular systolic function is normal with a 60-65% estimated ejection fraction.  2. Spectral Doppler shows an impaired relaxation pattern of left ventricular diastolic filling.  3. TAPSE= 20.3 mm.    Clinical  information reviewed:   Tobacco  Allergies  Meds   Med Hx  Surg Hx  OB Status  Fam Hx  Soc   Hx        NPO Detail:  NPO/Void Status  Date of Last Liquid: 07/23/25  Time of Last Liquid: 0845  Date of Last Solid: 07/21/25  Time of Last Solid: 1800         Results from last 7 days   Lab Units 07/23/25  0925   POCT GLUCOSE mg/dL 144*      Physical Exam    Airway  Mallampati: III  TM distance: >3 FB  Neck ROM: full  Mouth opening: 3 or more finger widths     Cardiovascular   Rhythm: regular  Rate: normal     Dental   Comments: Few missing teeth upper jaw     Pulmonary Comments: Normal RR  Non-labored respiration    Abdominal (+) obese             Anesthesia Plan    History of general anesthesia?: yes  History of complications of general anesthesia?: no    ASA 3     MAC   (Standard ASA monitoring. )  intravenous induction   Anesthetic plan and risks discussed with patient.    Plan discussed with CRNA and CAA.

## 2025-07-23 NOTE — H&P
History Of Present Illness  Earline Villagran is a 69 y.o. female presenting with Fuch's endothelial corneal dystropy OD, visually significant cataract OD.     Past Medical History  Medical History[1]    Surgical History  Surgical History[2]     Social History  She reports that she quit smoking about 25 years ago. Her smoking use included cigarettes. She has never been exposed to tobacco smoke. She has never used smokeless tobacco. She reports current alcohol use. She reports that she does not use drugs.    Family History  Family History[3]     Allergies  Raspberry, Ciprofloxacin, Clindamycin, Green tea, Penicillins, Sulfamethoxazole-trimethoprim, Jardiance [empagliflozin], Metformin, and Phenylephrine-guaifenesin    Review of Systems  No new flashes, floaters, eye pain, sudden vision loss, double vision, redness, or drainage.       Physical Exam  HENT:      Head: Normocephalic and atraumatic.   Cardiovascular:      Pulses: Warm and well perfused  Pulmonary:      Effort: Pulmonary effort is normal.   Abdominal:      General: Abdomen is flat.      Palpations: Abdomen is soft.   Musculoskeletal:         General: No deformity.   Skin:     General: Skin is warm and dry.   Neurological:      General: No focal deficit present.      Mental Status: He is alert and oriented to person, place, and time.   Psychiatric:         Mood and Affect: Mood normal.      Last Recorded Vitals  Weight 103 kg (226 lb).    Relevant Results        No current facility-administered medications on file prior to encounter.     Current Outpatient Medications on File Prior to Encounter   Medication Sig Dispense Refill    albuterol 90 mcg/actuation inhaler Inhale 2 puffs every 6 hours if needed for wheezing. 18 g 0    blood sugar diagnostic (OneTouch Ultra Test) strip TEST GLUCOSE 2 TO 3 TIMES DAILY 300 strip 3    carvedilol (Coreg) 3.125 mg tablet Take 1 tablet (3.125 mg) by mouth 2 times a day. 180 tablet 1    gabapentin (Neurontin) 100 mg  capsule Take 1 capsule (100 mg) by mouth 3 times a day. (Patient not taking: Reported on 7/14/2025) 270 capsule 1    hydrOXYzine HCL (Atarax) 10 mg tablet TAKE 1 TABLET BY MOUTH EVERY 8  HOURS IF NEEDED FOR ITCHING 270 tablet 0    meclizine (Antivert) 25 mg tablet Take 1 tablet (25 mg) by mouth 3 times a day as needed for dizziness. 30 tablet 1    nortriptyline (Pamelor) 25 mg capsule TAKE 1 CAPSULE (25 MG) BY MOUTH 2 TIMES A DAY. (Patient taking differently: Take 1 capsule (25 mg) by mouth 2 times a day as needed.) 180 capsule 3    OneTouch Delica Plus Lancet 33 gauge misc USE WITH BLOOD GLUCOSE TEST ONCE DAILY      OneTouch Ultra2 Meter misc 1 DEVICE ONCE DAILY. TEST ONE TIME A DAY. INSULIN DEP? NO E11.9 DM 2      tirzepatide (Mounjaro) 15 mg/0.5 mL pen injector Inject 15 mg under the skin 1 (one) time per week. 6 mL 3    ubrogepant (Ubrelvy) 50 mg tablet Take 1 tablet (50 mg) by mouth once daily as needed (for migraine, may repeat dose in 2 hours if needed, no more than 2 doses (100 mg) in 24 hours) for up to 192 doses. 16 tablet 11          Assessment & Plan  Combined form of age-related cataract, right eye    Fuchs' corneal dystrophy of both eyes      Plan:   - Right eye cataract extraction, intraocular lens insertion, descemet membrane endothelial keratoplasty         Syl Sanches MD         [1]   Past Medical History:  Diagnosis Date    Allergic     Arthritis     Cluster headache     Concussion with loss of consciousness of 30 minutes or less, subsequent encounter 2023    Concussion with loss of consciousness of 30 minutes or less, subsequent encounter    CTS (carpal tunnel syndrome) 3/2000    Diabetes mellitus (Multi) 9/2000    GERD (gastroesophageal reflux disease)     Herpes zoster 05/19/2024    Hyperlipidemia     Hypertension 4/2005    Migraine 2/14/22    Parathyroid disease (Multi)     Seizure disorder (Multi) 1976    happened in the hospital after she threw a blood clot after childbirth    Stroke  (Multi) 8/1976    after having her 2nd child, threw a clot    Type 2 diabetes mellitus     Vertigo    [2]   Past Surgical History:  Procedure Laterality Date    BREAST BIOPSY Left 2000    benign    CARDIAC CATHETERIZATION N/A 05/28/2025    Procedure: LHC, With LV;  Surgeon: Carrie Herrera MD;  Location: [facility] Cardiac Cath Lab;  Service: Cardiovascular;  Laterality: N/A;    COLONOSCOPY      HERNIA REPAIR  Baby    HYSTERECTOMY  Feb22    THYROID SURGERY      subtotal thyroidectomy   [3]   Family History  Problem Relation Name Age of Onset    Hypertension Father Dylan Mtznchard     Stroke Father Dylan Mtznchard     Cancer Maternal Grandmother Kim kashif     Breast cancer Cousin      Ovarian cancer Cousin      Glaucoma Neg Hx      Macular degeneration Neg Hx

## 2025-07-24 ENCOUNTER — APPOINTMENT (OUTPATIENT)
Dept: OPHTHALMOLOGY | Facility: CLINIC | Age: 70
End: 2025-07-24
Payer: MEDICARE

## 2025-07-24 DIAGNOSIS — Z94.7 HISTORY OF DESCEMET MEMBRANE ENDOTHELIAL KERATOPLASTY (DMEK): Primary | ICD-10-CM

## 2025-07-24 PROCEDURE — 99024 POSTOP FOLLOW-UP VISIT: CPT | Performed by: OPHTHALMOLOGY

## 2025-07-24 ASSESSMENT — EXTERNAL EXAM - RIGHT EYE: OD_EXAM: NORMAL

## 2025-07-24 ASSESSMENT — EXTERNAL EXAM - LEFT EYE: OS_EXAM: NORMAL

## 2025-07-24 ASSESSMENT — VISUAL ACUITY
METHOD: SNELLEN - LINEAR
OD_PH_SC: 20/40
OD_SC: 20/40-2

## 2025-07-24 ASSESSMENT — SLIT LAMP EXAM - LIDS
COMMENTS: NORMAL
COMMENTS: NORMAL

## 2025-07-24 ASSESSMENT — TONOMETRY
IOP_METHOD: TONOPEN APPLANATION
OD_IOP_MMHG: 19

## 2025-07-24 NOTE — PROGRESS NOTES
POD1 s/p DMEK triple OD  Graft attached 360, tr edema  Supine positioning  PF qid   Moxi qid  ER precautions  PO instructions  1 week

## 2025-07-25 LAB
FUNGUS SPEC CULT: NORMAL
FUNGUS SPEC FUNGUS STN: NORMAL

## 2025-07-29 LAB
FUNGUS SPEC CULT: NORMAL
FUNGUS SPEC FUNGUS STN: NORMAL

## 2025-08-01 ENCOUNTER — APPOINTMENT (OUTPATIENT)
Dept: PULMONOLOGY | Facility: CLINIC | Age: 70
End: 2025-08-01
Payer: MEDICARE

## 2025-08-01 ENCOUNTER — APPOINTMENT (OUTPATIENT)
Dept: OPHTHALMOLOGY | Facility: CLINIC | Age: 70
End: 2025-08-01
Payer: MEDICARE

## 2025-08-01 DIAGNOSIS — H18.513 FUCHS' CORNEAL DYSTROPHY OF BOTH EYES: ICD-10-CM

## 2025-08-01 DIAGNOSIS — H25.811 COMBINED FORM OF AGE-RELATED CATARACT, RIGHT EYE: ICD-10-CM

## 2025-08-01 DIAGNOSIS — T85.328A: Primary | ICD-10-CM

## 2025-08-01 PROCEDURE — 99024 POSTOP FOLLOW-UP VISIT: CPT | Performed by: OPHTHALMOLOGY

## 2025-08-01 RX ORDER — PREDNISOLONE ACETATE 10 MG/ML
1 SUSPENSION/ DROPS OPHTHALMIC 4 TIMES DAILY
Qty: 5 ML | Refills: 1 | Status: SHIPPED | OUTPATIENT
Start: 2025-08-01 | End: 2025-08-31

## 2025-08-01 RX ORDER — TETRACAINE HYDROCHLORIDE 5 MG/ML
1 SOLUTION OPHTHALMIC ONCE
OUTPATIENT
Start: 2025-08-01 | End: 2025-08-01

## 2025-08-01 RX ORDER — MOXIFLOXACIN 5 MG/ML
1 SOLUTION/ DROPS OPHTHALMIC 4 TIMES DAILY
Qty: 3 ML | Refills: 1 | Status: SHIPPED | OUTPATIENT
Start: 2025-08-01 | End: 2025-08-08

## 2025-08-01 ASSESSMENT — ENCOUNTER SYMPTOMS
EYES NEGATIVE: 1
ENDOCRINE NEGATIVE: 1

## 2025-08-01 ASSESSMENT — VISUAL ACUITY
METHOD: SNELLEN - LINEAR
OD_SC: CF @ FACE
OS_SC: 20/50+1

## 2025-08-01 ASSESSMENT — TONOMETRY
OS_IOP_MMHG: 16
IOP_METHOD: TONOPEN
OD_IOP_MMHG: 14

## 2025-08-01 ASSESSMENT — EXTERNAL EXAM - RIGHT EYE: OD_EXAM: NORMAL

## 2025-08-01 ASSESSMENT — EXTERNAL EXAM - LEFT EYE: OS_EXAM: NORMAL

## 2025-08-01 ASSESSMENT — SLIT LAMP EXAM - LIDS
COMMENTS: NORMAL
COMMENTS: NORMAL

## 2025-08-02 ENCOUNTER — ANESTHESIA (OUTPATIENT)
Dept: OPERATING ROOM | Facility: HOSPITAL | Age: 70
End: 2025-08-02
Payer: MEDICARE

## 2025-08-02 ENCOUNTER — ANESTHESIA EVENT (OUTPATIENT)
Dept: OPERATING ROOM | Facility: HOSPITAL | Age: 70
End: 2025-08-02
Payer: MEDICARE

## 2025-08-02 ENCOUNTER — HOSPITAL ENCOUNTER (OUTPATIENT)
Facility: HOSPITAL | Age: 70
Setting detail: OUTPATIENT SURGERY
Discharge: HOME | End: 2025-08-02
Attending: OPHTHALMOLOGY | Admitting: OPHTHALMOLOGY
Payer: MEDICARE

## 2025-08-02 ENCOUNTER — DOCUMENTATION (OUTPATIENT)
Dept: OPHTHALMOLOGY | Facility: CLINIC | Age: 70
End: 2025-08-02
Payer: MEDICARE

## 2025-08-02 VITALS
SYSTOLIC BLOOD PRESSURE: 108 MMHG | DIASTOLIC BLOOD PRESSURE: 53 MMHG | BODY MASS INDEX: 34.79 KG/M2 | TEMPERATURE: 97 F | HEART RATE: 82 BPM | RESPIRATION RATE: 15 BRPM | HEIGHT: 66 IN | WEIGHT: 216.49 LBS | OXYGEN SATURATION: 99 %

## 2025-08-02 DIAGNOSIS — T85.328A: Primary | ICD-10-CM

## 2025-08-02 DIAGNOSIS — H25.811 COMBINED FORM OF AGE-RELATED CATARACT, RIGHT EYE: ICD-10-CM

## 2025-08-02 DIAGNOSIS — H18.513 FUCHS' CORNEAL DYSTROPHY OF BOTH EYES: ICD-10-CM

## 2025-08-02 LAB
GLUCOSE BLD MANUAL STRIP-MCNC: 109 MG/DL (ref 74–99)
GLUCOSE BLD MANUAL STRIP-MCNC: 118 MG/DL (ref 74–99)

## 2025-08-02 PROCEDURE — 7100000010 HC PHASE TWO TIME - EACH INCREMENTAL 1 MINUTE: Performed by: OPHTHALMOLOGY

## 2025-08-02 PROCEDURE — 3600000008 HC OR TIME - EACH INCREMENTAL 1 MINUTE - PROCEDURE LEVEL THREE: Performed by: OPHTHALMOLOGY

## 2025-08-02 PROCEDURE — 7100000009 HC PHASE TWO TIME - INITIAL BASE CHARGE: Performed by: OPHTHALMOLOGY

## 2025-08-02 PROCEDURE — 3700000002 HC GENERAL ANESTHESIA TIME - EACH INCREMENTAL 1 MINUTE: Performed by: OPHTHALMOLOGY

## 2025-08-02 PROCEDURE — 3600000003 HC OR TIME - INITIAL BASE CHARGE - PROCEDURE LEVEL THREE: Performed by: OPHTHALMOLOGY

## 2025-08-02 PROCEDURE — 2500000004 HC RX 250 GENERAL PHARMACY W/ HCPCS (ALT 636 FOR OP/ED)

## 2025-08-02 PROCEDURE — 66020 INJECTION TREATMENT OF EYE: CPT | Performed by: OPHTHALMOLOGY

## 2025-08-02 PROCEDURE — 82947 ASSAY GLUCOSE BLOOD QUANT: CPT

## 2025-08-02 PROCEDURE — 2500000005 HC RX 250 GENERAL PHARMACY W/O HCPCS: Performed by: OPHTHALMOLOGY

## 2025-08-02 PROCEDURE — 3700000001 HC GENERAL ANESTHESIA TIME - INITIAL BASE CHARGE: Performed by: OPHTHALMOLOGY

## 2025-08-02 RX ORDER — METOCLOPRAMIDE HYDROCHLORIDE 5 MG/ML
10 INJECTION INTRAMUSCULAR; INTRAVENOUS ONCE AS NEEDED
Status: DISCONTINUED | OUTPATIENT
Start: 2025-08-02 | End: 2025-08-03 | Stop reason: HOSPADM

## 2025-08-02 RX ORDER — LIDOCAINE HYDROCHLORIDE 10 MG/ML
0.1 INJECTION, SOLUTION EPIDURAL; INFILTRATION; INTRACAUDAL; PERINEURAL ONCE
Status: DISCONTINUED | OUTPATIENT
Start: 2025-08-02 | End: 2025-08-03 | Stop reason: HOSPADM

## 2025-08-02 RX ORDER — POVIDONE-IODINE 5 %
SOLUTION, NON-ORAL OPHTHALMIC (EYE) AS NEEDED
Status: DISCONTINUED | OUTPATIENT
Start: 2025-08-02 | End: 2025-08-02 | Stop reason: HOSPADM

## 2025-08-02 RX ORDER — MIDAZOLAM HYDROCHLORIDE 2 MG/2ML
INJECTION, SOLUTION INTRAMUSCULAR; INTRAVENOUS AS NEEDED
Status: DISCONTINUED | OUTPATIENT
Start: 2025-08-02 | End: 2025-08-02

## 2025-08-02 RX ORDER — LIDOCAINE HYDROCHLORIDE 10 MG/ML
0.1 INJECTION, SOLUTION EPIDURAL; INFILTRATION; INTRACAUDAL; PERINEURAL ONCE
Status: DISCONTINUED | OUTPATIENT
Start: 2025-08-02 | End: 2025-08-02 | Stop reason: SDUPTHER

## 2025-08-02 RX ORDER — FENTANYL CITRATE 50 UG/ML
INJECTION, SOLUTION INTRAMUSCULAR; INTRAVENOUS CONTINUOUS PRN
Status: DISCONTINUED | OUTPATIENT
Start: 2025-08-02 | End: 2025-08-02

## 2025-08-02 RX ORDER — ONDANSETRON HYDROCHLORIDE 2 MG/ML
4 INJECTION, SOLUTION INTRAVENOUS ONCE AS NEEDED
Status: DISCONTINUED | OUTPATIENT
Start: 2025-08-02 | End: 2025-08-02 | Stop reason: SDUPTHER

## 2025-08-02 RX ORDER — PROPOFOL 10 MG/ML
INJECTION, EMULSION INTRAVENOUS AS NEEDED
Status: DISCONTINUED | OUTPATIENT
Start: 2025-08-02 | End: 2025-08-02

## 2025-08-02 RX ORDER — TETRACAINE HYDROCHLORIDE 5 MG/ML
SOLUTION OPHTHALMIC AS NEEDED
Status: DISCONTINUED | OUTPATIENT
Start: 2025-08-02 | End: 2025-08-02 | Stop reason: HOSPADM

## 2025-08-02 RX ORDER — METOCLOPRAMIDE HYDROCHLORIDE 5 MG/ML
10 INJECTION INTRAMUSCULAR; INTRAVENOUS ONCE AS NEEDED
Status: DISCONTINUED | OUTPATIENT
Start: 2025-08-02 | End: 2025-08-02 | Stop reason: SDUPTHER

## 2025-08-02 RX ORDER — ONDANSETRON HYDROCHLORIDE 2 MG/ML
INJECTION, SOLUTION INTRAVENOUS AS NEEDED
Status: DISCONTINUED | OUTPATIENT
Start: 2025-08-02 | End: 2025-08-02

## 2025-08-02 RX ORDER — DEXMEDETOMIDINE IN 0.9 % NACL 20 MCG/5ML
SYRINGE (ML) INTRAVENOUS AS NEEDED
Status: DISCONTINUED | OUTPATIENT
Start: 2025-08-02 | End: 2025-08-02

## 2025-08-02 RX ORDER — ONDANSETRON HYDROCHLORIDE 2 MG/ML
4 INJECTION, SOLUTION INTRAVENOUS ONCE AS NEEDED
Status: DISCONTINUED | OUTPATIENT
Start: 2025-08-02 | End: 2025-08-03 | Stop reason: HOSPADM

## 2025-08-02 RX ADMIN — ONDANSETRON 4 MG: 2 INJECTION, SOLUTION INTRAMUSCULAR; INTRAVENOUS at 21:11

## 2025-08-02 RX ADMIN — PROPOFOL 30 MG: 10 INJECTION, EMULSION INTRAVENOUS at 21:03

## 2025-08-02 RX ADMIN — MIDAZOLAM HYDROCHLORIDE 2 MG: 2 INJECTION, SOLUTION INTRAMUSCULAR; INTRAVENOUS at 21:00

## 2025-08-02 RX ADMIN — PROPOFOL 50 MG: 10 INJECTION, EMULSION INTRAVENOUS at 20:54

## 2025-08-02 RX ADMIN — Medication 8 MCG: at 21:12

## 2025-08-02 RX ADMIN — PROPOFOL 30 MG: 10 INJECTION, EMULSION INTRAVENOUS at 21:00

## 2025-08-02 RX ADMIN — SODIUM CHLORIDE, SODIUM LACTATE, POTASSIUM CHLORIDE, AND CALCIUM CHLORIDE: 600; 310; 30; 20 INJECTION, SOLUTION INTRAVENOUS at 20:45

## 2025-08-02 RX ADMIN — Medication 4 MCG: at 21:16

## 2025-08-02 ASSESSMENT — PAIN SCALES - GENERAL
PAINLEVEL_OUTOF10: 0 - NO PAIN
PAIN_LEVEL: 0
PAINLEVEL_OUTOF10: 0 - NO PAIN

## 2025-08-02 ASSESSMENT — PAIN - FUNCTIONAL ASSESSMENT: PAIN_FUNCTIONAL_ASSESSMENT: 0-10

## 2025-08-02 NOTE — PROGRESS NOTES
68 y/o F hx FECD s/p DMEK triple OD with Dr. Harper 7/23/25. Now pending in-OR DMEK rebubbling after inability to tolerate in-office rebubbling yesterday 8/1/25. Surgery request placed for today 8/2/25 with tentative time of 12:00 PM.    Extensive attempts have been made to reach the patient at listed phone number ([phone]) on evening of 8/1/25 and the morning of 8/2/25 without success. Multiple voicemails were left providing instructions to present to Jessi preop at 11:00 AM today and emphasizing NPO after midnight status. Also attempted to contact emergency contact  Nikko Villagran at listed number of [phone], although this number appears to have been disconnected. Also sent text via [employer] adonay.    These efforts were discussed with Dr. Harper. We will continue to attempt to contact the patient and will proceed with rebubbling if patient presents as instructed.    Janneth Bryan M.D.  Ophthalmology, PGY4

## 2025-08-02 NOTE — H&P
History Of Present Illness  Earline Villagran is a 69 y.o. female presenting with partial DMEK detachment OD, plan for rebubbling in OR.      Past Medical History  Medical History[1]    Surgical History  Surgical History[2]     Social History  She reports that she quit smoking about 25 years ago. Her smoking use included cigarettes. She started smoking about 49 years ago. She has a 19.2 pack-year smoking history. She has never been exposed to tobacco smoke. She has never used smokeless tobacco. She reports that she does not currently use alcohol. She reports that she does not use drugs.    Family History  Family History[3]     Allergies  Raspberry, Ciprofloxacin, Clindamycin, Green tea, Penicillins, Sulfamethoxazole-trimethoprim, Jardiance [empagliflozin], Metformin, and Phenylephrine-guaifenesin    Review of Systems   All other systems reviewed and are negative.       Physical Exam    Physical Exam  Constitutional:       General: NAD, nontoxic appearing  Cardiovascular:      Well perfused, 2+ radial pulses b/l   Pulmonary:      No increased WOB  Psychiatric:     Appropriate affect       Last Recorded Vitals  There were no vitals taken for this visit.    Relevant Results           Assessment & Plan  Displacement of other ocular prosthetic devices, implants and grafts, initial encounter      Earline Villagran is a 69 y.o. female presenting with partial DMEK detachment OD, plan for DMEK rebubbling in OR OD.         Janneth Bryan MD         [1]   Past Medical History:  Diagnosis Date    Allergic     Arthritis     Cluster headache     Concussion with loss of consciousness of 30 minutes or less, subsequent encounter 2023    Concussion with loss of consciousness of 30 minutes or less, subsequent encounter    CTS (carpal tunnel syndrome) 3/2000    Diabetes mellitus (Multi) 9/2000    GERD (gastroesophageal reflux disease)     Herpes zoster 05/19/2024    Hyperlipidemia     Hypertension 4/2005    Migraine 2/14/22     Parathyroid disease (Multi)     Seizure disorder (Multi) 1976    happened in the hospital after she threw a blood clot after childbirth    Stroke (Multi) 8/1976    after having her 2nd child, threw a clot    Type 2 diabetes mellitus 2000    Vertigo    [2]   Past Surgical History:  Procedure Laterality Date    BREAST BIOPSY Left 2000    benign    CARDIAC CATHETERIZATION N/A 05/28/2025    Procedure: LHC, With LV;  Surgeon: Carrie Herrera MD;  Location[facility] Cardiac [facility] Lab;  Service: Cardiovascular;  Laterality: N/A;    CATARACT EXTRACTION      07/23/2025- PHACO PCIOL OD W/ KISHAK - DR. ASHLEY    COLONOSCOPY      HERNIA REPAIR  Baby    HYSTERECTOMY  Feb22    THYROID SURGERY      subtotal thyroidectomy    TUBAL LIGATION  1985   [3]   Family History  Problem Relation Name Age of Onset    Hypertension Father Dylan Ruelas     Stroke Father Dylan Ruelas     Cancer Maternal Grandmother Kim rowland     Breast cancer Cousin      Ovarian cancer Cousin      Glaucoma Neg Hx      Macular degeneration Neg Hx

## 2025-08-02 NOTE — PROGRESS NOTES
POW 1 s/p DMEK triple OD  Pt ran out of gtts 2 days ago and did not renew  Graft with multiple areas of detachment  Discussed with pt r/b/a of rebubbling. She would like to proceed  Attempted rebubbling at the slit lamp but not successful due to patient poor cooperation and needle bumping into graft edge  Will plan for rebubbling tomorrow at [facility]. Surgery request placed  Restart PF and moxi qid

## 2025-08-03 NOTE — BRIEF OP NOTE
Date: 2025  OR Location: [facility] OR    Name: Earline Villagran, : 1955, Age: 69 y.o., MRN: [id number], Sex: female    Diagnosis  Pre-op Diagnosis      * Displacement of other ocular prosthetic devices, implants and grafts, initial encounter [T85.328A] Post-op Diagnosis     * Displacement of other ocular prosthetic devices, implants and grafts, initial encounter [T85.328A]     Procedures  Injection air anterior chamber  90029 - TN INJX ANTERIOR CHAMBER EYE MEDICATION SPX      Surgeons      * Misty Harper - Primary    Resident/Fellow/Other Assistant:  Surgeons and Role:     * Janneth Bryan MD - Resident - Assisting    Staff:   Circulator: Lobar  Scrub Person: Shari    Anesthesia Staff: Anesthesiologist: Stewart Trinidad MD  Anesthesia Resident: Kylie Alonso MD    Procedure Summary  Anesthesia: Monitor Anesthesia Care  ASA: III  Estimated Blood Loss: 0mL  Intra-op Medications: Administrations occurring from  to  on 25:  * No intraprocedure medications in log *           Anesthesia Record               Intraprocedure I/O Totals       None           Specimen: No specimens collected               Findings: DMEK with multiple areas of detachment, now s/p rebubbling    Complications:  None; patient tolerated the procedure well.     Disposition: PACU - hemodynamically stable.  Condition: stable  Specimens Collected: No specimens collected  Attending Attestation:     Misty Harper  Phone Number: [phone]

## 2025-08-03 NOTE — ANESTHESIA PREPROCEDURE EVALUATION
Patient: Earline Villagran    Procedure Information       Anesthesia Start Date/Time: 25    Procedure: Injection air anterior chamber (Right: Eye)    Location: [facility] OR  [facility] OR    Surgeons: Misty Harper MD            Relevant Problems   Cardiac   (+) Atherosclerosis of native artery of both lower extremities, with unspecified presence of clinical manifestation   (+) Benign essential hypertension   (+) Coronary atherosclerosis   (+) Hyperlipidemia      Pulmonary   (+) GARCIA (dyspnea on exertion)      Neuro   (+) Chronic intractable headache      GI   (+) Gastroesophageal reflux disease      Endocrine   (+) CKD stage 3 due to type 2 diabetes mellitus (Multi)   (+) Class 2 severe obesity due to excess calories with serious comorbidity and body mass index (BMI) of 37.0 to 37.9 in adult   (+) Hypothyroidism   (+) Type 2 diabetes mellitus with diabetic neuropathy, unspecified (Multi)   (+) Type 2 diabetes mellitus with hyperglycemia, with long-term current use of insulin   (+) Type 2 diabetes mellitus without complication       Clinical information reviewed:                   NPO Detail:  NPO/Void Status  Date of Last Liquid: 25  Time of Last Liquid: 1300  Date of Last Solid: 25  Time of Last Solid: 1030         Physical Exam    Airway  Mallampati: III  TM distance: <3 FB  Mouth openin finger widths     Cardiovascular   Rhythm: regular  Rate: normal     Dental    Pulmonary - normal exam   Abdominal            Anesthesia Plan    History of general anesthesia?: yes  History of complications of general anesthesia?: no    ASA 3 - emergent     MAC     Anesthetic plan and risks discussed with patient.  Use of blood products discussed with patient who.    Plan discussed with attending and resident.

## 2025-08-03 NOTE — DISCHARGE INSTRUCTIONS
1) Keep your eye patch and shield on until tomorrow morning. You can remove the patch and shield tomorrow morning  2) Your eyelid will be droopy, and the right eye will have trouble moving until tomorrow afternoon due to anesthesia. This is expected and normal.  3) Start using Prednisolone (Pink cap) and Moxifloxacin (Beige cap) eye drops four times a day in the right eye after removing your patch and shield  4) Until you see Dr. Harper on Tuesday, lay on your back as often as possible. Try to stay on your back for 2 hours before taking a 10-15 minute break to eat or use the bathroom  5) Sleep with your eye shield in place  6) Do not bend past the waist, lift more than 10 pounds or strain  7) See Dr. Harper Tuesday at [facility] eye [facility] 8/5/25 at 1:30 PM

## 2025-08-03 NOTE — ANESTHESIA POSTPROCEDURE EVALUATION
Patient: Earline Villagran    Procedure Summary       Date: 08/02/25 Room / Location: [facility] OR 04 / [facility] OR    Anesthesia Start: 2045 Anesthesia Stop: 2137    Procedure: Injection air anterior chamber (Right: Eye) Diagnosis:       Displacement of other ocular prosthetic devices, implants and grafts, initial encounter      (Displacement of other ocular prosthetic devices, implants and grafts, initial encounter [T85.328A])    Surgeons: Misty Harper MD Responsible Provider: Stewart Trinidad MD    Anesthesia Type: MAC ASA Status: 3 - Emergent            Anesthesia Type: MAC    Vitals Value Taken Time   /58 08/02/25 21:37   Temp 37.7 08/02/25 21:37   Pulse 90 08/02/25 21:37   Resp 18 08/02/25 21:37   SpO2 96 08/02/25 21:37       Anesthesia Post Evaluation    Patient location during evaluation: PACU  Patient participation: complete - patient participated  Level of consciousness: awake and alert  Pain score: 0  Pain management: adequate  Multimodal analgesia pain management approach  Airway patency: patent  Two or more strategies used to mitigate risk of obstructive sleep apnea  Cardiovascular status: acceptable  Respiratory status: acceptable  Hydration status: acceptable  Postoperative Nausea and Vomiting: none        No notable events documented.

## 2025-08-04 LAB
FUNGUS SPEC CULT: NORMAL
FUNGUS SPEC FUNGUS STN: NORMAL

## 2025-08-04 NOTE — OP NOTE
Injection air anterior chamber (R) Operative Note     Date: 2025  OR Location: [facility] OR    Name: Earline Villagran, : 1955, Age: 69 y.o., MRN: [id number], Sex: female    Diagnosis  Pre-op Diagnosis      * Displacement of other ocular prosthetic devices, implants and grafts, initial encounter [T85.328A] Post-op Diagnosis     * Displacement of other ocular prosthetic devices, implants and grafts, initial encounter [T85.328A]     Procedures  Injection air anterior chamber  207[address] - [place] INJX ANTERIOR CHAMBER EYE MEDICATION SPX      Surgeons      * Misty Harper - Primary    Resident/Fellow/Other Assistant:  Surgeons and Role:     * Janneth Bryan MD - Resident - Assisting    Staff:   Circulator: Lobar  Scrub Person: Shari    Anesthesia Staff: Anesthesiologist: Stewart Trinidad MD  Anesthesia Resident: Kylie Alonso MD    Procedure Summary  Anesthesia: Monitor Anesthesia Care  ASA: III  Estimated Blood Loss: 0mL  Intra-op Medications: Administrations occurring from  to  on 25:  * No intraprocedure medications in log *           Anesthesia Record               Intraprocedure I/O Totals       None           Specimen: No specimens collected              Drains and/or Catheters: * None in log *    Tourniquet Times:         Implants:     Findings: Detached DMEK graft    Indications: Earline Villagran is an 69 y.o. female who is having surgery for Displacement of other ocular prosthetic devices, implants and grafts, initial encounter [T85.328A].     The patient was seen in the preoperative area. The risks, benefits, complications, treatment options, non-operative alternatives, expected recovery and outcomes were discussed with the patient. The possibilities of reaction to medication, pulmonary aspiration, injury to surrounding structures, bleeding, recurrent infection, the need for additional procedures, failure to diagnose a condition, and creating a complication requiring transfusion or operation  were discussed with the patient. The patient concurred with the proposed plan, giving informed consent.  The site of surgery was properly noted/marked if necessary per policy. The patient has been actively warmed in preoperative area. Preoperative antibiotics have been ordered and given within 1 hours of incision. Venous thrombosis prophylaxis are not indicated.    Procedure Details: The patient was placed in the supine position on the operating room table where appropriate blood pressure and cardiac monitoring were initiated. The local anesthetic used was a 1:1 mixture of 2% Xylocaine and 0.5% bupivacaine. 5 mL were used to perform a retrobulbar block of the operative eye. The patient was prepped and draped in the usual sterile fashion for intraocular surgery. This included instillation of Betadine 5% onto the ocular surface followed by irrigation with balance salt solution a minute or two later. An eyelid speculum was inserted. An inferior side port incision was created. Air on a cannula was injected into the AC behind  the detached DMEK graft. The edges of the graft were seen to be attached after the air injection. Using a 30 gauge needle, about 1cc of air was injected into the AC to achieve an IOP of about 25-30 mmHg. Then, the eyelid speculum was removed and the patient placed in the supine position for 60 minutes.   Evidence of Infection: No   Complications:  None; patient tolerated the procedure well.    Disposition: PACU - hemodynamically stable.  Condition: stable                 Additional Details: None    Attending Attestation: I performed the procedure.    Misty Harper  Phone Number: [phone]

## 2025-08-05 ENCOUNTER — APPOINTMENT (OUTPATIENT)
Dept: PHARMACY | Facility: HOSPITAL | Age: 70
End: 2025-08-05
Payer: MEDICARE

## 2025-08-05 ENCOUNTER — OFFICE VISIT (OUTPATIENT)
Dept: OPHTHALMOLOGY | Facility: CLINIC | Age: 70
End: 2025-08-05
Payer: MEDICARE

## 2025-08-05 DIAGNOSIS — T85.328A: Primary | ICD-10-CM

## 2025-08-05 PROCEDURE — 99024 POSTOP FOLLOW-UP VISIT: CPT | Performed by: OPHTHALMOLOGY

## 2025-08-05 ASSESSMENT — VISUAL ACUITY
OS_PH_SC: 20/40
OS_SC: 20/60
OD_SC: 20/25
METHOD: SNELLEN - LINEAR

## 2025-08-05 ASSESSMENT — TONOMETRY
IOP_METHOD: TONOPEN
OS_IOP_MMHG: 18
OD_IOP_MMHG: 14

## 2025-08-05 ASSESSMENT — SLIT LAMP EXAM - LIDS
COMMENTS: NORMAL
COMMENTS: NORMAL

## 2025-08-05 ASSESSMENT — EXTERNAL EXAM - LEFT EYE: OS_EXAM: NORMAL

## 2025-08-05 ASSESSMENT — ENCOUNTER SYMPTOMS: EYES NEGATIVE: 1

## 2025-08-05 ASSESSMENT — EXTERNAL EXAM - RIGHT EYE: OD_EXAM: NORMAL

## 2025-08-05 NOTE — PROGRESS NOTES
POW 2 s/p DMEK triple  POD 3 s/p rebubbling  Graft attached and clearing  Supine positioning for 2 days  PF and moxi qid  2 weeks

## 2025-08-05 NOTE — PROGRESS NOTES
Clinical Pharmacy Appointment    Patient ID: Earline Villagran is a 69 y.o. female who presents for Diabetes.    Pt is here for Follow Up appointment.     Referring Provider: Patricia Calderon MD  PCP: Patricia Calderon MD  Last visit with PCP: 3/13/2025   Next visit with PCP: 2025    Subjective   Drug Interactions  No relevant drug interactions were noted.    Medication System Management  Patient's preferred pharmacy: [id number] in [place]  Adherence/Organization: No concerns at this time  Affordability/Accessibility: No concerns at this time    HPI  DIABETES MELLITUS TYPE 2:    Diagnosed with diabetes:  20 years ago.   Known diabetic complications: hypeglycemia.  Does patient follow with Endocrinology: Yes  Last optometry exam: 3/19/2025    Current diabetic medications include:  Mounjaro 15 mg; inject 15 mg once weekly on   Glimepiride 4 mg; take 1 tablet BID    Clarifications to above regimen: None  Adverse Effects: None    Past diabetic medications include:  Trulicity, Ozempic, Januvia, Glipizide, Metformin, Jardiance    Glucose Readings:  Glucometer/CGM Type: OneTouch Ultra Glucometer  Patient tests BG 2 times per day    Current home BG readings (mg/dL): lost meter, will send new meter. Will send Accu-chek due to several insurances no longer covering OneTouch.  Previous home BG readings (mg/dL): FB-110 mg/dL; PPB-198 mg/dL    Any episodes of hypoglycemia? No, lowest in the upper 70s.  Did patient treat episode of hypoglycemia appropriately? N/A  Does the patient have a prescription for ready-to-use Glucagon? Not on insulin    Lifestyle:  Diet: 2 meals/day.   BK: Cereal, banana  LN: Salad, meat, shrimp  Drinks: Water, sparkling water  Exercise: patient starting to do more exercise  Tobacco history: Former smoker    Secondary Prevention:  Statin? Yes  ACE-I/ARB? No  Aspirin? Yes    Pertinent PMH Review:  PMH of Pancreatitis: No  PMH of Retinopathy: No  PMH of Urinary  Tract Infections: No  PMH of MTC: No  PMH of MEN2: No  UACR/EGFR in last year?: No  Albumin/Creatine Ratio   Date Value Ref Range Status   06/04/2022 17.5 0.0 - 30.0 ug/mg crt Final     Immunizations:  Influenza? Yes  COVID? Yes  Pneumonia? Most recent is Prevnar 13 from 8/24/2021    Objective   Allergies[1]  Social History     Social History Narrative    Not on file      Medication Review  Current Outpatient Medications   Medication Instructions    albuterol 90 mcg/actuation inhaler 2 puffs, inhalation, Every 6 hours PRN    aspirin 81 mg, Daily    blood sugar diagnostic (Accu-Chek Guide test strips) Use to check blood sugars 2 times a day as directed.    blood-glucose meter (Accu-Chek Guide Glucose Meter) misc Use to check blood sugars 2 times a day as directed.    carvedilol (COREG) 3.125 mg, oral, 2 times daily    ezetimibe (ZETIA) 10 mg, oral, Daily    gabapentin (NEURONTIN) 100 mg, oral, 3 times daily    glimepiride (AMARYL) 4 mg, oral    hydroCHLOROthiazide (HYDRODIURIL) 25 mg, oral, Daily    hydrOXYzine HCL (Atarax) 10 mg tablet TAKE 1 TABLET BY MOUTH EVERY 8  HOURS IF NEEDED FOR ITCHING    lancets misc Use to check blood sugars 2 times a day as directed.    levothyroxine (SYNTHROID, LEVOXYL) 125 mcg, oral, Daily    meclizine (ANTIVERT) 25 mg, oral, 3 times daily PRN    Mounjaro 15 mg, subcutaneous, Weekly    moxifloxacin (Vigamox) 0.5 % ophthalmic solution 1 drop, Right Eye, 4 times daily    nortriptyline (PAMELOR) 25 mg, oral, 2 times daily    omeprazole (PRILOSEC) 20 mg, oral, Daily    prednisoLONE acetate (Pred-Forte) 1 % ophthalmic suspension 1 drop, Right Eye, 4 times daily    rosuvastatin (CRESTOR) 40 mg, oral, Daily    Ubrelvy 50 mg, oral, Daily PRN      Vitals  BP Readings from Last 2 Encounters:   08/02/25 108/53   07/23/25 128/59     BMI Readings from Last 1 Encounters:   08/02/25 34.94 kg/m²      Labs  A1C  Lab Results   Component Value Date    HGBA1C 7.8 (H) 05/07/2025    HGBA1C 11.8 (A)  09/26/2024    HGBA1C 10.6 (H) 05/19/2024     BMP  Lab Results   Component Value Date    CALCIUM 9.2 05/28/2025     05/28/2025    K 4.0 05/28/2025    CO2 24 05/28/2025     05/28/2025    BUN 16 05/28/2025    CREATININE 1.16 (H) 05/28/2025    EGFR 51 (L) 05/28/2025     LFTs  Lab Results   Component Value Date    ALT 14 03/31/2025    AST 14 03/31/2025    ALKPHOS 74 03/31/2025    BILITOT 0.4 03/31/2025     FLP  Lab Results   Component Value Date    TRIG 161 (H) 12/29/2023    CHOL 222 (H) 12/29/2023    LDLF 198 (H) 08/09/2023    LDLCALC 140 (H) 12/29/2023    HDL 50.3 12/29/2023     Urine Microalbumin  Lab Results   Component Value Date    MICROALBCREA 17.5 06/04/2022     Weight Management  Wt Readings from Last 3 Encounters:   08/02/25 98.2 kg (216 lb 7.9 oz)   07/23/25 100 kg (220 lb 10.9 oz)   06/25/25 101 kg (223 lb 3.2 oz)      There is no height or weight on file to calculate BMI.     Assessment/Plan   Problem List Items Addressed This Visit       Type 2 diabetes mellitus with hyperglycemia, with long-term current use of insulin    Patient's goal A1c is < 7%.  Is pt at goal? No, patient's most recent A1c from 5/7/2025 was 7.8%  Patient has not been able to check recently because she lost her meter at hospital. Will send new meter for patient.   Rationale for plan: Patient is well-controlled. Counseled patient that if she experiences hypoglycemia to call pharmacist.    Medication Changes:  CONTINUE  Mounjaro 15 mg; inject 15 mg once weekly on Saturdays  Glimepiride 4 mg; take 1 tablet BID    Future Considerations:  If patient has low blood sugars can decrease dose of glimepiride    Monitoring and Education:  Answered all patient questions and concerns         Relevant Medications    blood-glucose meter (Accu-Chek Guide Glucose Meter) misc    blood sugar diagnostic (Accu-Chek Guide test strips)    lancets misc    tirzepatide (Mounjaro) 15 mg/0.5 mL pen injector    Other Relevant Orders    Referral to  Clinical Pharmacy         Clinical Pharmacist follow-up: 9/2/2025, Telehealth visit    Patient is not followed in [facility]. (If yes, track minutes under compass emma at each visit)    Continue all meds under the continuation of care with the referring provider and clinical pharmacy team.    Thank you,  Theresa Castañeda, PharmD  Clinical Pharmacist - Primary Care  [phone]  8/6/2025    Verbal consent to manage patient's drug therapy was obtained from the patient. They were informed they may decline to participate or withdraw from participation in pharmacy services at any time.         [1]   Allergies  Allergen Reactions    Raspberry Shortness of breath    Ciprofloxacin Rash     Itching    Clindamycin Diarrhea     Other Reaction(s): GI Upset         Green Tea Swelling     Lip swells   W green tea and neto, Lip swells   W green tea and neto    Penicillins Syncope              Sulfamethoxazole-Trimethoprim Hives, Unknown and Rash     Other Reaction(s): Unknown    Jardiance [Empagliflozin] Other     Shaky     Metformin Diarrhea and GI Upset    Phenylephrine-Guaifenesin Palpitations     Fast heart rate

## 2025-08-06 ENCOUNTER — TELEMEDICINE (OUTPATIENT)
Dept: PHARMACY | Facility: HOSPITAL | Age: 70
End: 2025-08-06
Payer: MEDICARE

## 2025-08-06 DIAGNOSIS — Z79.4 TYPE 2 DIABETES MELLITUS WITH HYPERGLYCEMIA, WITH LONG-TERM CURRENT USE OF INSULIN: ICD-10-CM

## 2025-08-06 DIAGNOSIS — E11.65 TYPE 2 DIABETES MELLITUS WITH HYPERGLYCEMIA, WITH LONG-TERM CURRENT USE OF INSULIN: ICD-10-CM

## 2025-08-06 RX ORDER — BLOOD SUGAR DIAGNOSTIC
STRIP MISCELLANEOUS
Qty: 200 STRIP | Refills: 3 | Status: SHIPPED | OUTPATIENT
Start: 2025-08-06

## 2025-08-06 RX ORDER — DEXTROSE 4 G
TABLET,CHEWABLE ORAL
Qty: 1 EACH | Refills: 0 | Status: SHIPPED | OUTPATIENT
Start: 2025-08-06

## 2025-08-06 RX ORDER — LANCETS
EACH MISCELLANEOUS
Qty: 200 EACH | Refills: 3 | Status: SHIPPED | OUTPATIENT
Start: 2025-08-06

## 2025-08-06 RX ORDER — TIRZEPATIDE 15 MG/.5ML
15 INJECTION, SOLUTION SUBCUTANEOUS WEEKLY
Qty: 6 ML | Refills: 3 | Status: SHIPPED | OUTPATIENT
Start: 2025-08-06

## 2025-08-06 NOTE — ASSESSMENT & PLAN NOTE
Patient's goal A1c is < 7%.  Is pt at goal? No, patient's most recent A1c from 5/7/2025 was 7.8%  Patient has not been able to check recently because she lost her meter at hospital. Will send new meter for patient.   Rationale for plan: Patient is well-controlled. Counseled patient that if she experiences hypoglycemia to call pharmacist.    Medication Changes:  CONTINUE  Mounjaro 15 mg; inject 15 mg once weekly on Saturdays  Glimepiride 4 mg; take 1 tablet BID    Future Considerations:  If patient has low blood sugars can decrease dose of glimepiride    Monitoring and Education:  Answered all patient questions and concerns

## 2025-08-11 LAB
FUNGUS SPEC CULT: NORMAL
FUNGUS SPEC FUNGUS STN: NORMAL

## 2025-08-12 DIAGNOSIS — H18.513 FUCHS' CORNEAL DYSTROPHY OF BOTH EYES: ICD-10-CM

## 2025-08-12 DIAGNOSIS — H25.811 COMBINED FORM OF AGE-RELATED CATARACT, RIGHT EYE: ICD-10-CM

## 2025-08-12 RX ORDER — MOXIFLOXACIN 5 MG/ML
1 SOLUTION/ DROPS OPHTHALMIC 4 TIMES DAILY
Qty: 3 ML | Refills: 1 | Status: SHIPPED | OUTPATIENT
Start: 2025-08-12 | End: 2025-08-19

## 2025-08-12 RX ORDER — MOXIFLOXACIN 5 MG/ML
1 SOLUTION/ DROPS OPHTHALMIC 4 TIMES DAILY
COMMUNITY
End: 2025-08-12 | Stop reason: SDUPTHER

## 2025-08-14 DIAGNOSIS — R09.81 SINUS CONGESTION: ICD-10-CM

## 2025-08-14 RX ORDER — FLUTICASONE PROPIONATE 50 MCG
2 SPRAY, SUSPENSION (ML) NASAL DAILY
Qty: 48 ML | Refills: 1 | Status: SHIPPED | OUTPATIENT
Start: 2025-08-14

## 2025-08-15 DIAGNOSIS — N39.0 RECURRENT UTI: ICD-10-CM

## 2025-08-19 ENCOUNTER — APPOINTMENT (OUTPATIENT)
Dept: OPHTHALMOLOGY | Facility: CLINIC | Age: 70
End: 2025-08-19
Payer: MEDICARE

## 2025-08-19 DIAGNOSIS — H25.811 COMBINED FORM OF AGE-RELATED CATARACT, RIGHT EYE: ICD-10-CM

## 2025-08-19 DIAGNOSIS — H18.513 FUCHS' CORNEAL DYSTROPHY OF BOTH EYES: ICD-10-CM

## 2025-08-19 PROCEDURE — 99024 POSTOP FOLLOW-UP VISIT: CPT | Performed by: OPHTHALMOLOGY

## 2025-08-19 RX ORDER — PREDNISOLONE ACETATE 10 MG/ML
1 SUSPENSION/ DROPS OPHTHALMIC 3 TIMES DAILY
Qty: 5 ML | Refills: 2 | Status: SHIPPED | OUTPATIENT
Start: 2025-08-19

## 2025-08-19 ASSESSMENT — VISUAL ACUITY
OS_PH_SC+: +1
OS_SC: 20/50
OS_SC+: +1
OS_PH_SC: 20/40
OD_SC: 20/30
METHOD: SNELLEN - LINEAR
OD_SC+: -2

## 2025-08-19 ASSESSMENT — TONOMETRY
IOP_METHOD: TONOPEN
OD_IOP_MMHG: 14
OS_IOP_MMHG: 15

## 2025-08-19 ASSESSMENT — SLIT LAMP EXAM - LIDS
COMMENTS: NORMAL
COMMENTS: NORMAL

## 2025-08-19 ASSESSMENT — EXTERNAL EXAM - RIGHT EYE: OD_EXAM: NORMAL

## 2025-08-19 ASSESSMENT — EXTERNAL EXAM - LEFT EYE: OS_EXAM: NORMAL

## 2025-08-25 ENCOUNTER — OFFICE VISIT (OUTPATIENT)
Dept: PRIMARY CARE | Facility: CLINIC | Age: 70
End: 2025-08-25
Payer: MEDICARE

## 2025-08-25 VITALS
TEMPERATURE: 95.4 F | HEART RATE: 115 BPM | OXYGEN SATURATION: 97 % | HEIGHT: 66 IN | BODY MASS INDEX: 35.52 KG/M2 | WEIGHT: 221 LBS | SYSTOLIC BLOOD PRESSURE: 140 MMHG | DIASTOLIC BLOOD PRESSURE: 82 MMHG

## 2025-08-25 DIAGNOSIS — R21 RASH: Primary | ICD-10-CM

## 2025-08-25 PROCEDURE — 1160F RVW MEDS BY RX/DR IN RCRD: CPT | Performed by: FAMILY MEDICINE

## 2025-08-25 PROCEDURE — 99212 OFFICE O/P EST SF 10 MIN: CPT | Performed by: FAMILY MEDICINE

## 2025-08-25 PROCEDURE — 3008F BODY MASS INDEX DOCD: CPT | Performed by: FAMILY MEDICINE

## 2025-08-25 PROCEDURE — 1159F MED LIST DOCD IN RCRD: CPT | Performed by: FAMILY MEDICINE

## 2025-08-25 PROCEDURE — 3079F DIAST BP 80-89 MM HG: CPT | Performed by: FAMILY MEDICINE

## 2025-08-25 PROCEDURE — 3077F SYST BP >= 140 MM HG: CPT | Performed by: FAMILY MEDICINE

## 2025-08-25 RX ORDER — TRIAMCINOLONE ACETONIDE 1 MG/G
CREAM TOPICAL 2 TIMES DAILY PRN
Qty: 28.4 G | Refills: 0 | Status: SHIPPED | OUTPATIENT
Start: 2025-08-25

## 2025-08-25 RX ORDER — TRIAMCINOLONE ACETONIDE 1 MG/G
CREAM TOPICAL 2 TIMES DAILY
Qty: 28.4 G | Refills: 0 | Status: SHIPPED | OUTPATIENT
Start: 2025-08-25 | End: 2025-08-25 | Stop reason: SDUPTHER

## 2025-08-25 ASSESSMENT — ENCOUNTER SYMPTOMS
LOSS OF SENSATION IN FEET: 0
DEPRESSION: 0
OCCASIONAL FEELINGS OF UNSTEADINESS: 0

## 2025-09-02 ENCOUNTER — APPOINTMENT (OUTPATIENT)
Dept: PHARMACY | Facility: HOSPITAL | Age: 70
End: 2025-09-02
Payer: MEDICARE

## 2025-09-04 ENCOUNTER — PATIENT MESSAGE (OUTPATIENT)
Dept: CARDIOLOGY | Facility: CLINIC | Age: 70
End: 2025-09-04
Payer: MEDICARE

## 2025-09-04 DIAGNOSIS — I25.10 ATHEROSCLEROSIS OF NATIVE CORONARY ARTERY OF NATIVE HEART WITHOUT ANGINA PECTORIS: ICD-10-CM

## 2025-09-05 RX ORDER — ROSUVASTATIN CALCIUM 40 MG/1
40 TABLET, COATED ORAL DAILY
Qty: 90 TABLET | Refills: 3 | Status: SHIPPED | OUTPATIENT
Start: 2025-09-05 | End: 2026-09-05

## 2025-09-05 RX ORDER — EZETIMIBE 10 MG/1
10 TABLET ORAL DAILY
Qty: 90 TABLET | Refills: 3 | Status: SHIPPED | OUTPATIENT
Start: 2025-09-05 | End: 2026-09-05

## 2025-09-16 ENCOUNTER — APPOINTMENT (OUTPATIENT)
Dept: OPHTHALMOLOGY | Facility: CLINIC | Age: 70
End: 2025-09-16
Payer: MEDICARE

## 2025-09-29 ENCOUNTER — APPOINTMENT (OUTPATIENT)
Dept: PRIMARY CARE | Facility: CLINIC | Age: 70
End: 2025-09-29
Payer: MEDICARE

## 2025-10-07 ENCOUNTER — APPOINTMENT (OUTPATIENT)
Dept: PHARMACY | Facility: HOSPITAL | Age: 70
End: 2025-10-07
Payer: MEDICARE

## 2026-03-13 ENCOUNTER — APPOINTMENT (OUTPATIENT)
Dept: UROLOGY | Facility: CLINIC | Age: 71
End: 2026-03-13
Payer: MEDICARE

## 2026-03-23 ENCOUNTER — APPOINTMENT (OUTPATIENT)
Dept: OPHTHALMOLOGY | Facility: CLINIC | Age: 71
End: 2026-03-23
Payer: MEDICARE

## (undated) DEVICE — SYRINGE, MONOJECT, LUER LOCK, 3 CC, LF

## (undated) DEVICE — SYRINGE, 50 CC, LUER LOCK

## (undated) DEVICE — SYRINGE, 3 CC, LUER LOCK

## (undated) DEVICE — NEEDLE, HYPODERMIC, REGULAR WALL, REGULAR BEVEL, 30 G X 0.5 IN

## (undated) DEVICE — Device

## (undated) DEVICE — SHIELD, EYE, FOX, CONVEX, ALUMINUM, NS

## (undated) DEVICE — DRAPE, SHEET, THREE QUARTER, FAN FOLD, 57 X 77 IN

## (undated) DEVICE — DRESSING, TRANSPARENT, TEGADERM, 2-3/8 X 2-3/4 IN

## (undated) DEVICE — NEEDLE, RETROBULBAR 25GA X 1 1/4"

## (undated) DEVICE — GUIDEWIRE, GO2WIRE, STEERABLE, 0.035 X 260CM, FLOPPY STRAIGHT

## (undated) DEVICE — SHEATH, GLIDESHEATH, SLENDER, 6FR 10CM

## (undated) DEVICE — SWAB, CULTURE, MINI-TIP, W/STUART LIQUID, SOFTWIRE

## (undated) DEVICE — HANDPIECE,  IRRIGATION/ASPIRATION, 45DEG, 2.2MM-2.8MM, BLUE

## (undated) DEVICE — GLOVE, SURGICAL, PROTEXIS PI , 8.0, PF, LF

## (undated) DEVICE — FILTER, TRANSDUCER, DUALEX, 0.2 MIC

## (undated) DEVICE — REST, HEAD, BAGEL, 9 IN

## (undated) DEVICE — STOPCOCK, 4 WAY, FEMALE LUER LOCK, MALE SPIN-LOCK, PORT COVERS

## (undated) DEVICE — SPEAR, EYE, SURGICAL, WECK-CELL, CELLULOSE

## (undated) DEVICE — RING, MALYUGIN MST 6.25MM

## (undated) DEVICE — SPEAR, EYE, SURGICAL, WECK-CEL, CELLULOSE

## (undated) DEVICE — GOWN, ASTOUND, XL

## (undated) DEVICE — CATHETER, ANGIO, IMPULSE, FL3.5, 5 FR X 100 CM

## (undated) DEVICE — TOWEL, SURGICAL, NEURO, O/R, 16 X 26, BLUE, STERILE

## (undated) DEVICE — CATHETER, ANGIO, IMPULSE, FR4, 5 FR X 100 CM

## (undated) DEVICE — TR BAND, RADIAL COMPRESSION, LONG, 29CM

## (undated) DEVICE — KNIFE, SLIT, ANGLED UP, 2.65 MM

## (undated) DEVICE — SYRINGE, 5 CC, LUER LOCK

## (undated) DEVICE — CANNULA, 27G X 22MM, ANTERIOR, CHAMBER, RYCROFT